# Patient Record
Sex: MALE | Race: WHITE | Employment: FULL TIME | URBAN - METROPOLITAN AREA
[De-identification: names, ages, dates, MRNs, and addresses within clinical notes are randomized per-mention and may not be internally consistent; named-entity substitution may affect disease eponyms.]

---

## 2018-07-21 PROBLEM — G40.909 RECURRENT SEIZURES (HCC): Status: ACTIVE | Noted: 2018-07-21

## 2019-03-13 ENCOUNTER — HOSPITAL ENCOUNTER (INPATIENT)
Age: 34
LOS: 2 days | Discharge: ANOTHER HOSPITAL | DRG: 100 | End: 2019-03-15
Attending: HOSPITALIST | Admitting: HOSPITALIST
Payer: COMMERCIAL

## 2019-03-13 ENCOUNTER — HOSPITAL ENCOUNTER (EMERGENCY)
Age: 34
Discharge: ANOTHER HOSPITAL | End: 2019-03-13
Attending: EMERGENCY MEDICINE
Payer: COMMERCIAL

## 2019-03-13 ENCOUNTER — APPOINTMENT (OUTPATIENT)
Dept: CT IMAGING | Age: 34
End: 2019-03-13
Payer: COMMERCIAL

## 2019-03-13 ENCOUNTER — APPOINTMENT (OUTPATIENT)
Dept: GENERAL RADIOLOGY | Age: 34
End: 2019-03-13
Payer: COMMERCIAL

## 2019-03-13 VITALS
BODY MASS INDEX: 21.9 KG/M2 | WEIGHT: 153 LBS | DIASTOLIC BLOOD PRESSURE: 50 MMHG | RESPIRATION RATE: 18 BRPM | HEIGHT: 70 IN | HEART RATE: 59 BPM | OXYGEN SATURATION: 97 % | SYSTOLIC BLOOD PRESSURE: 96 MMHG | TEMPERATURE: 98.6 F

## 2019-03-13 DIAGNOSIS — G40.901 STATUS EPILEPTICUS (HCC): Primary | ICD-10-CM

## 2019-03-13 PROBLEM — R56.9 SEIZURES (HCC): Status: ACTIVE | Noted: 2019-03-13

## 2019-03-13 LAB
ALBUMIN SERPL-MCNC: 4.4 GM/DL (ref 3.4–5)
ALP BLD-CCNC: 67 IU/L (ref 40–129)
ALT SERPL-CCNC: 13 U/L (ref 10–40)
ANION GAP SERPL CALCULATED.3IONS-SCNC: 9 MMOL/L (ref 4–16)
AST SERPL-CCNC: 19 IU/L (ref 15–37)
BASOPHILS ABSOLUTE: 0.1 K/CU MM
BASOPHILS RELATIVE PERCENT: 0.6 % (ref 0–1)
BILIRUB SERPL-MCNC: 0.3 MG/DL (ref 0–1)
BUN BLDV-MCNC: 8 MG/DL (ref 6–23)
CALCIUM SERPL-MCNC: 9 MG/DL (ref 8.3–10.6)
CHLORIDE BLD-SCNC: 104 MMOL/L (ref 99–110)
CO2: 29 MMOL/L (ref 21–32)
CREAT SERPL-MCNC: 0.8 MG/DL (ref 0.9–1.3)
DIFFERENTIAL TYPE: ABNORMAL
EOSINOPHILS ABSOLUTE: 0 K/CU MM
EOSINOPHILS RELATIVE PERCENT: 0.2 % (ref 0–3)
GFR AFRICAN AMERICAN: >60 ML/MIN/1.73M2
GFR NON-AFRICAN AMERICAN: >60 ML/MIN/1.73M2
GLUCOSE BLD-MCNC: 87 MG/DL (ref 70–99)
HCT VFR BLD CALC: 41.5 % (ref 42–52)
HEMOGLOBIN: 13.9 GM/DL (ref 13.5–18)
IMMATURE NEUTROPHIL %: 0.2 % (ref 0–0.43)
LACTATE: 1.2 MMOL/L (ref 0.4–2)
LYMPHOCYTES ABSOLUTE: 1.1 K/CU MM
LYMPHOCYTES RELATIVE PERCENT: 13.3 % (ref 24–44)
MCH RBC QN AUTO: 30 PG (ref 27–31)
MCHC RBC AUTO-ENTMCNC: 33.5 % (ref 32–36)
MCV RBC AUTO: 89.4 FL (ref 78–100)
MONOCYTES ABSOLUTE: 0.4 K/CU MM
MONOCYTES RELATIVE PERCENT: 4.7 % (ref 0–4)
PDW BLD-RTO: 13.2 % (ref 11.7–14.9)
PLATELET # BLD: 246 K/CU MM (ref 140–440)
PMV BLD AUTO: 11.2 FL (ref 7.5–11.1)
POTASSIUM SERPL-SCNC: 3.8 MMOL/L (ref 3.5–5.1)
RBC # BLD: 4.64 M/CU MM (ref 4.6–6.2)
SEGMENTED NEUTROPHILS ABSOLUTE COUNT: 6.8 K/CU MM
SEGMENTED NEUTROPHILS RELATIVE PERCENT: 81 % (ref 36–66)
SODIUM BLD-SCNC: 142 MMOL/L (ref 135–145)
TOTAL IMMATURE NEUTOROPHIL: 0.02 K/CU MM
TOTAL PROTEIN: 6.9 GM/DL (ref 6.4–8.2)
TROPONIN T: <0.01 NG/ML
WBC # BLD: 8.5 K/CU MM (ref 4–10.5)

## 2019-03-13 PROCEDURE — 83605 ASSAY OF LACTIC ACID: CPT

## 2019-03-13 PROCEDURE — 6360000002 HC RX W HCPCS: Performed by: HOSPITALIST

## 2019-03-13 PROCEDURE — 6370000000 HC RX 637 (ALT 250 FOR IP): Performed by: NURSE PRACTITIONER

## 2019-03-13 PROCEDURE — 96374 THER/PROPH/DIAG INJ IV PUSH: CPT

## 2019-03-13 PROCEDURE — 99291 CRITICAL CARE FIRST HOUR: CPT

## 2019-03-13 PROCEDURE — 6360000002 HC RX W HCPCS: Performed by: EMERGENCY MEDICINE

## 2019-03-13 PROCEDURE — 85025 COMPLETE CBC W/AUTO DIFF WBC: CPT

## 2019-03-13 PROCEDURE — 80053 COMPREHEN METABOLIC PANEL: CPT

## 2019-03-13 PROCEDURE — 2580000003 HC RX 258: Performed by: HOSPITALIST

## 2019-03-13 PROCEDURE — 87040 BLOOD CULTURE FOR BACTERIA: CPT

## 2019-03-13 PROCEDURE — 70450 CT HEAD/BRAIN W/O DYE: CPT

## 2019-03-13 PROCEDURE — 93010 ELECTROCARDIOGRAM REPORT: CPT | Performed by: INTERNAL MEDICINE

## 2019-03-13 PROCEDURE — 2060000000 HC ICU INTERMEDIATE R&B

## 2019-03-13 PROCEDURE — 84484 ASSAY OF TROPONIN QUANT: CPT

## 2019-03-13 PROCEDURE — 93005 ELECTROCARDIOGRAM TRACING: CPT | Performed by: EMERGENCY MEDICINE

## 2019-03-13 PROCEDURE — 71045 X-RAY EXAM CHEST 1 VIEW: CPT

## 2019-03-13 RX ORDER — LEVETIRACETAM 10 MG/ML
1000 INJECTION INTRAVASCULAR EVERY 12 HOURS
Status: DISCONTINUED | OUTPATIENT
Start: 2019-03-13 | End: 2019-03-13 | Stop reason: HOSPADM

## 2019-03-13 RX ORDER — LORAZEPAM 2 MG/ML
2 INJECTION INTRAMUSCULAR EVERY 4 HOURS PRN
Status: DISCONTINUED | OUTPATIENT
Start: 2019-03-13 | End: 2019-03-15 | Stop reason: HOSPADM

## 2019-03-13 RX ORDER — DICYCLOMINE HCL 20 MG
20 TABLET ORAL
Status: DISCONTINUED | OUTPATIENT
Start: 2019-03-13 | End: 2019-03-15 | Stop reason: HOSPADM

## 2019-03-13 RX ORDER — SODIUM CHLORIDE 0.9 % (FLUSH) 0.9 %
10 SYRINGE (ML) INJECTION PRN
Status: DISCONTINUED | OUTPATIENT
Start: 2019-03-13 | End: 2019-03-15 | Stop reason: HOSPADM

## 2019-03-13 RX ORDER — HYDROCODONE BITARTRATE AND ACETAMINOPHEN 7.5; 325 MG/1; MG/1
1 TABLET ORAL EVERY 6 HOURS PRN
Status: DISCONTINUED | OUTPATIENT
Start: 2019-03-13 | End: 2019-03-15 | Stop reason: HOSPADM

## 2019-03-13 RX ORDER — ACETAMINOPHEN 500 MG
500 TABLET ORAL EVERY 4 HOURS PRN
Status: DISCONTINUED | OUTPATIENT
Start: 2019-03-13 | End: 2019-03-15 | Stop reason: HOSPADM

## 2019-03-13 RX ORDER — LORAZEPAM 2 MG/ML
1 INJECTION INTRAMUSCULAR ONCE
Status: COMPLETED | OUTPATIENT
Start: 2019-03-13 | End: 2019-03-13

## 2019-03-13 RX ORDER — ONDANSETRON 2 MG/ML
4 INJECTION INTRAMUSCULAR; INTRAVENOUS EVERY 6 HOURS PRN
Status: DISCONTINUED | OUTPATIENT
Start: 2019-03-13 | End: 2019-03-15 | Stop reason: HOSPADM

## 2019-03-13 RX ORDER — TOPIRAMATE 25 MG/1
50 TABLET ORAL 2 TIMES DAILY
Status: ON HOLD | COMMUNITY
End: 2019-03-13 | Stop reason: ALTCHOICE

## 2019-03-13 RX ORDER — ZOLMITRIPTAN 5 MG/1
5 TABLET, FILM COATED ORAL PRN
COMMUNITY
End: 2019-08-17

## 2019-03-13 RX ORDER — SODIUM CHLORIDE 0.9 % (FLUSH) 0.9 %
10 SYRINGE (ML) INJECTION EVERY 12 HOURS SCHEDULED
Status: DISCONTINUED | OUTPATIENT
Start: 2019-03-13 | End: 2019-03-15 | Stop reason: HOSPADM

## 2019-03-13 RX ADMIN — ENOXAPARIN SODIUM 40 MG: 40 INJECTION SUBCUTANEOUS at 21:25

## 2019-03-13 RX ADMIN — HYDROCODONE BITARTRATE AND ACETAMINOPHEN 1 TABLET: 7.5; 325 TABLET ORAL at 21:24

## 2019-03-13 RX ADMIN — SODIUM CHLORIDE, PRESERVATIVE FREE 10 ML: 5 INJECTION INTRAVENOUS at 21:26

## 2019-03-13 RX ADMIN — LORAZEPAM 1 MG: 2 INJECTION INTRAMUSCULAR; INTRAVENOUS at 10:30

## 2019-03-13 RX ADMIN — LEVETIRACETAM 500 MG: 100 INJECTION, SOLUTION INTRAVENOUS at 21:38

## 2019-03-13 RX ADMIN — DICYCLOMINE HYDROCHLORIDE 20 MG: 20 TABLET ORAL at 21:25

## 2019-03-13 RX ADMIN — LEVETIRACETAM 1000 MG: 10 INJECTION INTRAVENOUS at 10:30

## 2019-03-13 ASSESSMENT — PAIN DESCRIPTION - ONSET: ONSET: ON-GOING

## 2019-03-13 ASSESSMENT — PAIN SCALES - GENERAL
PAINLEVEL_OUTOF10: 0
PAINLEVEL_OUTOF10: 6
PAINLEVEL_OUTOF10: 0

## 2019-03-13 ASSESSMENT — PAIN DESCRIPTION - PAIN TYPE: TYPE: ACUTE PAIN

## 2019-03-13 ASSESSMENT — PAIN DESCRIPTION - LOCATION: LOCATION: GENERALIZED;HEAD

## 2019-03-13 ASSESSMENT — PAIN DESCRIPTION - PROGRESSION: CLINICAL_PROGRESSION: NOT CHANGED

## 2019-03-13 ASSESSMENT — PAIN DESCRIPTION - DESCRIPTORS: DESCRIPTORS: DISCOMFORT;ACHING

## 2019-03-13 ASSESSMENT — PAIN DESCRIPTION - FREQUENCY: FREQUENCY: CONTINUOUS

## 2019-03-14 LAB
ANION GAP SERPL CALCULATED.3IONS-SCNC: 7 MMOL/L (ref 4–16)
BUN BLDV-MCNC: 12 MG/DL (ref 6–23)
CALCIUM SERPL-MCNC: 9 MG/DL (ref 8.3–10.6)
CHLORIDE BLD-SCNC: 102 MMOL/L (ref 99–110)
CO2: 32 MMOL/L (ref 21–32)
CREAT SERPL-MCNC: 0.9 MG/DL (ref 0.9–1.3)
GFR AFRICAN AMERICAN: >60 ML/MIN/1.73M2
GFR NON-AFRICAN AMERICAN: >60 ML/MIN/1.73M2
GLUCOSE BLD-MCNC: 99 MG/DL (ref 70–99)
POTASSIUM SERPL-SCNC: 4.1 MMOL/L (ref 3.5–5.1)
SODIUM BLD-SCNC: 141 MMOL/L (ref 135–145)

## 2019-03-14 PROCEDURE — 6370000000 HC RX 637 (ALT 250 FOR IP): Performed by: NURSE PRACTITIONER

## 2019-03-14 PROCEDURE — 99255 IP/OBS CONSLTJ NEW/EST HI 80: CPT | Performed by: PSYCHIATRY & NEUROLOGY

## 2019-03-14 PROCEDURE — 94761 N-INVAS EAR/PLS OXIMETRY MLT: CPT

## 2019-03-14 PROCEDURE — 2060000000 HC ICU INTERMEDIATE R&B

## 2019-03-14 PROCEDURE — 2580000003 HC RX 258: Performed by: HOSPITALIST

## 2019-03-14 PROCEDURE — 6360000002 HC RX W HCPCS: Performed by: HOSPITALIST

## 2019-03-14 PROCEDURE — 36415 COLL VENOUS BLD VENIPUNCTURE: CPT

## 2019-03-14 PROCEDURE — 80048 BASIC METABOLIC PNL TOTAL CA: CPT

## 2019-03-14 PROCEDURE — 95819 EEG AWAKE AND ASLEEP: CPT

## 2019-03-14 RX ADMIN — LEVETIRACETAM 500 MG: 100 INJECTION, SOLUTION INTRAVENOUS at 10:03

## 2019-03-14 RX ADMIN — LORAZEPAM 2 MG: 2 INJECTION INTRAMUSCULAR; INTRAVENOUS at 22:18

## 2019-03-14 RX ADMIN — LORAZEPAM 2 MG: 2 INJECTION INTRAMUSCULAR; INTRAVENOUS at 13:14

## 2019-03-14 RX ADMIN — ENOXAPARIN SODIUM 40 MG: 40 INJECTION SUBCUTANEOUS at 08:23

## 2019-03-14 RX ADMIN — SODIUM CHLORIDE, PRESERVATIVE FREE 10 ML: 5 INJECTION INTRAVENOUS at 08:23

## 2019-03-14 RX ADMIN — DICYCLOMINE HYDROCHLORIDE 20 MG: 20 TABLET ORAL at 19:12

## 2019-03-14 RX ADMIN — LEVETIRACETAM 500 MG: 100 INJECTION, SOLUTION INTRAVENOUS at 22:20

## 2019-03-14 RX ADMIN — DICYCLOMINE HYDROCHLORIDE 20 MG: 20 TABLET ORAL at 12:37

## 2019-03-14 RX ADMIN — HYDROCODONE BITARTRATE AND ACETAMINOPHEN 1 TABLET: 7.5; 325 TABLET ORAL at 19:12

## 2019-03-14 RX ADMIN — HYDROCODONE BITARTRATE AND ACETAMINOPHEN 1 TABLET: 7.5; 325 TABLET ORAL at 10:07

## 2019-03-14 RX ADMIN — DICYCLOMINE HYDROCHLORIDE 20 MG: 20 TABLET ORAL at 21:23

## 2019-03-14 RX ADMIN — SODIUM CHLORIDE, PRESERVATIVE FREE 10 ML: 5 INJECTION INTRAVENOUS at 21:23

## 2019-03-14 RX ADMIN — DICYCLOMINE HYDROCHLORIDE 20 MG: 20 TABLET ORAL at 08:23

## 2019-03-14 ASSESSMENT — PAIN DESCRIPTION - LOCATION: LOCATION: GENERALIZED

## 2019-03-14 ASSESSMENT — PAIN SCALES - GENERAL
PAINLEVEL_OUTOF10: 2
PAINLEVEL_OUTOF10: 6
PAINLEVEL_OUTOF10: 0
PAINLEVEL_OUTOF10: 6
PAINLEVEL_OUTOF10: 0

## 2019-03-14 ASSESSMENT — PAIN DESCRIPTION - PAIN TYPE: TYPE: ACUTE PAIN

## 2019-03-15 VITALS
RESPIRATION RATE: 19 BRPM | DIASTOLIC BLOOD PRESSURE: 70 MMHG | WEIGHT: 170 LBS | HEIGHT: 70 IN | TEMPERATURE: 97.1 F | BODY MASS INDEX: 24.34 KG/M2 | HEART RATE: 87 BPM | OXYGEN SATURATION: 96 % | SYSTOLIC BLOOD PRESSURE: 116 MMHG

## 2019-03-15 PROCEDURE — 99225 PR SBSQ OBSERVATION CARE/DAY 25 MINUTES: CPT | Performed by: CLINICAL NURSE SPECIALIST

## 2019-03-15 PROCEDURE — 6360000002 HC RX W HCPCS: Performed by: HOSPITALIST

## 2019-03-15 PROCEDURE — 6370000000 HC RX 637 (ALT 250 FOR IP): Performed by: NURSE PRACTITIONER

## 2019-03-15 PROCEDURE — 2580000003 HC RX 258: Performed by: HOSPITALIST

## 2019-03-15 RX ADMIN — DICYCLOMINE HYDROCHLORIDE 20 MG: 20 TABLET ORAL at 06:40

## 2019-03-15 RX ADMIN — HYDROCODONE BITARTRATE AND ACETAMINOPHEN 1 TABLET: 7.5; 325 TABLET ORAL at 09:08

## 2019-03-15 RX ADMIN — LORAZEPAM 2 MG: 2 INJECTION INTRAMUSCULAR; INTRAVENOUS at 11:28

## 2019-03-15 RX ADMIN — DICYCLOMINE HYDROCHLORIDE 20 MG: 20 TABLET ORAL at 11:33

## 2019-03-15 RX ADMIN — SODIUM CHLORIDE, PRESERVATIVE FREE 10 ML: 5 INJECTION INTRAVENOUS at 09:01

## 2019-03-15 RX ADMIN — LEVETIRACETAM 500 MG: 100 INJECTION, SOLUTION INTRAVENOUS at 11:32

## 2019-03-15 RX ADMIN — ENOXAPARIN SODIUM 40 MG: 40 INJECTION SUBCUTANEOUS at 09:01

## 2019-03-15 ASSESSMENT — PAIN DESCRIPTION - DESCRIPTORS: DESCRIPTORS: ACHING

## 2019-03-15 ASSESSMENT — PAIN SCALES - GENERAL
PAINLEVEL_OUTOF10: 5
PAINLEVEL_OUTOF10: 0
PAINLEVEL_OUTOF10: 2

## 2019-03-15 ASSESSMENT — PAIN DESCRIPTION - ONSET: ONSET: GRADUAL

## 2019-03-15 ASSESSMENT — PAIN DESCRIPTION - PAIN TYPE: TYPE: ACUTE PAIN

## 2019-03-15 ASSESSMENT — PAIN DESCRIPTION - FREQUENCY: FREQUENCY: INTERMITTENT

## 2019-03-18 LAB
CULTURE: NORMAL
Lab: NORMAL
SPECIMEN: NORMAL

## 2019-03-19 LAB
EKG ATRIAL RATE: 69 BPM
EKG DIAGNOSIS: NORMAL
EKG P AXIS: 56 DEGREES
EKG P-R INTERVAL: 154 MS
EKG Q-T INTERVAL: 378 MS
EKG QRS DURATION: 98 MS
EKG QTC CALCULATION (BAZETT): 405 MS
EKG R AXIS: 11 DEGREES
EKG T AXIS: 23 DEGREES
EKG VENTRICULAR RATE: 69 BPM

## 2019-03-20 LAB
CULTURE: ABNORMAL
Lab: ABNORMAL
SPECIMEN: ABNORMAL

## 2019-08-17 ENCOUNTER — HOSPITAL ENCOUNTER (EMERGENCY)
Age: 34
Discharge: HOME | End: 2019-08-17
Attending: EMERGENCY MEDICINE
Payer: COMMERCIAL

## 2019-08-17 VITALS
HEART RATE: 71 BPM | HEIGHT: 69 IN | WEIGHT: 175 LBS | RESPIRATION RATE: 16 BRPM | SYSTOLIC BLOOD PRESSURE: 119 MMHG | OXYGEN SATURATION: 94 % | BODY MASS INDEX: 25.92 KG/M2 | DIASTOLIC BLOOD PRESSURE: 84 MMHG | TEMPERATURE: 97.5 F

## 2019-08-17 DIAGNOSIS — L50.9 URTICARIA: Primary | ICD-10-CM

## 2019-08-17 DIAGNOSIS — T50.905A MEDICATION REACTION, INITIAL ENCOUNTER: ICD-10-CM

## 2019-08-17 PROCEDURE — 99282 EMERGENCY DEPT VISIT SF MDM: CPT

## 2019-08-17 PROCEDURE — 6360000002 HC RX W HCPCS: Performed by: EMERGENCY MEDICINE

## 2019-08-17 PROCEDURE — 96372 THER/PROPH/DIAG INJ SC/IM: CPT

## 2019-08-17 PROCEDURE — 6370000000 HC RX 637 (ALT 250 FOR IP): Performed by: EMERGENCY MEDICINE

## 2019-08-17 RX ORDER — OXYCODONE HYDROCHLORIDE AND ACETAMINOPHEN 5; 325 MG/1; MG/1
1 TABLET ORAL EVERY 4 HOURS PRN
COMMUNITY
End: 2021-02-17

## 2019-08-17 RX ORDER — PREDNISONE 20 MG/1
60 TABLET ORAL ONCE
Status: COMPLETED | OUTPATIENT
Start: 2019-08-17 | End: 2019-08-17

## 2019-08-17 RX ORDER — DIPHENHYDRAMINE HYDROCHLORIDE 50 MG/ML
50 INJECTION INTRAMUSCULAR; INTRAVENOUS ONCE
Status: COMPLETED | OUTPATIENT
Start: 2019-08-17 | End: 2019-08-17

## 2019-08-17 RX ORDER — DIPHENHYDRAMINE HCL 25 MG
50 CAPSULE ORAL ONCE
Status: COMPLETED | OUTPATIENT
Start: 2019-08-17 | End: 2019-08-17

## 2019-08-17 RX ORDER — FAMOTIDINE 20 MG/1
20 TABLET, FILM COATED ORAL ONCE
Status: COMPLETED | OUTPATIENT
Start: 2019-08-17 | End: 2019-08-17

## 2019-08-17 RX ORDER — PREDNISONE 10 MG/1
TABLET ORAL
Qty: 21 TABLET | Refills: 0 | Status: SHIPPED | OUTPATIENT
Start: 2019-08-17 | End: 2020-06-15 | Stop reason: ALTCHOICE

## 2019-08-17 RX ORDER — CETIRIZINE HYDROCHLORIDE 10 MG/1
10 TABLET ORAL ONCE
Status: COMPLETED | OUTPATIENT
Start: 2019-08-17 | End: 2019-08-17

## 2019-08-17 RX ORDER — BUPROPION HYDROCHLORIDE 150 MG/1
150 TABLET ORAL EVERY MORNING
COMMUNITY
End: 2019-08-17

## 2019-08-17 RX ORDER — DIPHENHYDRAMINE HCL 25 MG
50 CAPSULE ORAL EVERY 6 HOURS PRN
Qty: 30 CAPSULE | Refills: 0 | Status: SHIPPED | OUTPATIENT
Start: 2019-08-17 | End: 2019-08-24

## 2019-08-17 RX ADMIN — DIPHENHYDRAMINE HYDROCHLORIDE 50 MG: 50 INJECTION INTRAMUSCULAR; INTRAVENOUS at 07:47

## 2019-08-17 RX ADMIN — PREDNISONE 60 MG: 20 TABLET ORAL at 07:46

## 2019-08-17 RX ADMIN — FAMOTIDINE 20 MG: 20 TABLET ORAL at 07:46

## 2019-08-17 RX ADMIN — DIPHENHYDRAMINE HYDROCHLORIDE 50 MG: 25 CAPSULE ORAL at 08:30

## 2019-08-17 RX ADMIN — CETIRIZINE HYDROCHLORIDE 10 MG: 10 TABLET, FILM COATED ORAL at 07:46

## 2019-08-17 ASSESSMENT — PAIN DESCRIPTION - PAIN TYPE: TYPE: ACUTE PAIN

## 2019-08-17 ASSESSMENT — PAIN DESCRIPTION - DESCRIPTORS: DESCRIPTORS: BURNING

## 2019-08-17 ASSESSMENT — PAIN DESCRIPTION - ONSET: ONSET: ON-GOING

## 2019-08-17 ASSESSMENT — PAIN SCALES - GENERAL
PAINLEVEL_OUTOF10: 6
PAINLEVEL_OUTOF10: 5

## 2019-08-17 ASSESSMENT — PAIN DESCRIPTION - FREQUENCY: FREQUENCY: CONTINUOUS

## 2019-08-17 ASSESSMENT — PAIN DESCRIPTION - PROGRESSION: CLINICAL_PROGRESSION: GRADUALLY WORSENING

## 2019-08-17 ASSESSMENT — PAIN - FUNCTIONAL ASSESSMENT: PAIN_FUNCTIONAL_ASSESSMENT: 0-10

## 2019-08-17 NOTE — ED PROVIDER NOTES
Emergency Department Encounter  Location: [facility] [address] [place] [address]    Patient: Cedric Yang  MRN: [id number]  : 1985  Date of evaluation: 2019  ED Provider: Brennen Diaz DO, FACEP    Chief Complaint:    Rash (pt with possible allergic reaction, some look like insect bites for face and arms, but has hives to trunk and down legs. Started wellbutrin )    [place]:  Cedric Yang is a 34 y.o. male that presents to the emergency department with complaints of a rash that started last night. The patient states he started Wellbutrin about 8 days ago for anxiety. He states this morning he awoke with a rash that is present on his upper extremities his face his neck his chest is back his groin his abdomen and his legs. He states it is very itchy and painful. He denies any difficulty swallowing or wheezing. He states he has been on Wellbutrin previously with no problems like this. He has not taken any medication for it. He describes his pain is 6 out of 10 and burning especially in his upper extremities. He is very anxious regarding what is going on. ROS:  At least 10 systems reviewed and otherwise acutely negative except as in the [address] Past Medical History:   Diagnosis Date    Arachnoid cyst     Arthritis     Chiari malformation type I ([facility] [address]     Depression     IBS (irritable bowel syndrome)     Migraines     Seizures (HCC)      Past Surgical History:   Procedure Laterality Date    APPENDECTOMY      NASAL SEPTUM SURGERY       History reviewed. No pertinent family history.   Social History     Socioeconomic History    Marital status: Single     Spouse name: Not on file    Number of children: Not on file    Years of education: Not on file    Highest education level: Not on file   Occupational History    Not on file   Social Needs    Financial resource strain: Not on file    Food insecurity:     Worry: Not on file     Inability: Not on file   [employer] of Pepcid p.o. and 10 mg of Zyrtec p.o. as well as 60 mg of prednisone p.o. I will keep him on a prednisone taper for the next 6 days. He will be on Benadryl 50 mg as needed for the urticaria. He is having no difficulty speaking and is having no wheezing. I think he is safe for discharge. I do think he is having a medication reaction to the Wellbutrin. He is encouraged to stop that medication. He is instructed to follow-up with his primary caregiver early next week or return to the emergency department if his condition worsens. He is discharged in stable condition at this time. Final Impression:  1. Urticaria    2.  Medication reaction, initial encounter      DISPOSITION Decision To Discharge    Patient referred to:  NUBIA Lincoln CNP  [address] [facility]  [phone]    In 3 days  For follow up    Discharge medications:  New Prescriptions    DIPHENHYDRAMINE (BENADRYL) 25 MG CAPSULE    Take 2 capsules by mouth every 6 hours as needed for Itching    PREDNISONE (DELTASONE) 10 MG TABLET    Take 6 tablets on day 1, take 5 tablets on day 2, take 4 tablets on day 3, take 3 tablets on day 4, take 2 tablets on day 5, and take 1 tablet on day 6     (Please note that portions of this note may have been completed with a voice recognition program. Efforts were made to edit the dictations but occasionally words are mis-transcribed.)    Beatriz Pickett DO, [facility]  Board certified in [facility], [address] [facility]  08/17/19 5176 [facility], [address] [facility]  08/17/19 0048

## 2020-06-15 ENCOUNTER — HOSPITAL ENCOUNTER (EMERGENCY)
Age: 35
Discharge: ANOTHER HOSPITAL | End: 2020-06-16
Attending: EMERGENCY MEDICINE
Payer: COMMERCIAL

## 2020-06-15 ENCOUNTER — HOSPITAL ENCOUNTER (EMERGENCY)
Age: 35
Discharge: HOME | End: 2020-06-15
Attending: EMERGENCY MEDICINE
Payer: COMMERCIAL

## 2020-06-15 ENCOUNTER — APPOINTMENT (OUTPATIENT)
Dept: CT IMAGING | Age: 35
End: 2020-06-15
Payer: COMMERCIAL

## 2020-06-15 VITALS
WEIGHT: 196 LBS | HEART RATE: 50 BPM | BODY MASS INDEX: 29.03 KG/M2 | RESPIRATION RATE: 16 BRPM | OXYGEN SATURATION: 98 % | DIASTOLIC BLOOD PRESSURE: 63 MMHG | TEMPERATURE: 97.8 F | SYSTOLIC BLOOD PRESSURE: 101 MMHG | HEIGHT: 69 IN

## 2020-06-15 LAB
CHP ED QC CHECK: YES
GLUCOSE BLD-MCNC: 113 MG/DL (ref 70–99)
GLUCOSE BLD-MCNC: 117 MG/DL

## 2020-06-15 PROCEDURE — G0480 DRUG TEST DEF 1-7 CLASSES: HCPCS

## 2020-06-15 PROCEDURE — 99284 EMERGENCY DEPT VISIT MOD MDM: CPT

## 2020-06-15 PROCEDURE — 85025 COMPLETE CBC W/AUTO DIFF WBC: CPT

## 2020-06-15 PROCEDURE — 2580000003 HC RX 258: Performed by: EMERGENCY MEDICINE

## 2020-06-15 PROCEDURE — 82962 GLUCOSE BLOOD TEST: CPT

## 2020-06-15 PROCEDURE — 80053 COMPREHEN METABOLIC PANEL: CPT

## 2020-06-15 PROCEDURE — 6360000002 HC RX W HCPCS: Performed by: EMERGENCY MEDICINE

## 2020-06-15 PROCEDURE — 96365 THER/PROPH/DIAG IV INF INIT: CPT

## 2020-06-15 PROCEDURE — 96374 THER/PROPH/DIAG INJ IV PUSH: CPT

## 2020-06-15 PROCEDURE — 93005 ELECTROCARDIOGRAM TRACING: CPT | Performed by: EMERGENCY MEDICINE

## 2020-06-15 PROCEDURE — 70450 CT HEAD/BRAIN W/O DYE: CPT

## 2020-06-15 PROCEDURE — 99285 EMERGENCY DEPT VISIT HI MDM: CPT

## 2020-06-15 PROCEDURE — 93010 ELECTROCARDIOGRAM REPORT: CPT | Performed by: INTERNAL MEDICINE

## 2020-06-15 RX ORDER — SODIUM CHLORIDE 0.9 % (FLUSH) 0.9 %
10 SYRINGE (ML) INJECTION PRN
Status: DISCONTINUED | OUTPATIENT
Start: 2020-06-15 | End: 2020-06-16 | Stop reason: HOSPADM

## 2020-06-15 RX ORDER — LEVETIRACETAM 500 MG/1
500 TABLET ORAL 2 TIMES DAILY
Qty: 60 TABLET | Refills: 3 | Status: ON HOLD | OUTPATIENT
Start: 2020-06-15 | End: 2020-06-17 | Stop reason: HOSPADM

## 2020-06-15 RX ORDER — SODIUM CHLORIDE 0.9 % (FLUSH) 0.9 %
10 SYRINGE (ML) INJECTION EVERY 12 HOURS SCHEDULED
Status: DISCONTINUED | OUTPATIENT
Start: 2020-06-15 | End: 2020-06-16 | Stop reason: HOSPADM

## 2020-06-15 RX ORDER — PAROXETINE 30 MG/1
30 TABLET, FILM COATED ORAL DAILY
Status: ON HOLD | COMMUNITY
Start: 2020-04-16 | End: 2020-06-17 | Stop reason: HOSPADM

## 2020-06-15 RX ORDER — OXYCODONE HYDROCHLORIDE AND ACETAMINOPHEN 5; 325 MG/1; MG/1
2 TABLET ORAL ONCE
Status: DISCONTINUED | OUTPATIENT
Start: 2020-06-15 | End: 2020-06-15

## 2020-06-15 RX ADMIN — LEVETIRACETAM 1000 MG: 100 INJECTION, SOLUTION INTRAVENOUS at 10:31

## 2020-06-15 ASSESSMENT — PAIN DESCRIPTION - PAIN TYPE: TYPE: ACUTE PAIN

## 2020-06-15 ASSESSMENT — PAIN DESCRIPTION - LOCATION: LOCATION: HEAD

## 2020-06-15 ASSESSMENT — PAIN DESCRIPTION - DESCRIPTORS: DESCRIPTORS: PRESSURE

## 2020-06-15 ASSESSMENT — PAIN DESCRIPTION - FREQUENCY: FREQUENCY: CONTINUOUS

## 2020-06-15 ASSESSMENT — PAIN SCALES - GENERAL: PAINLEVEL_OUTOF10: 8

## 2020-06-15 NOTE — ED PROVIDER NOTES
normal.      Left Ear: External ear normal.   Eyes:      General: No scleral icterus. Right eye: No discharge. Left eye: No discharge. Conjunctiva/sclera: Conjunctivae normal.      Pupils: Pupils are equal, round, and reactive to light. Neck:      Musculoskeletal: Normal range of motion and neck supple. Thyroid: No thyromegaly. Vascular: No JVD. Trachea: No tracheal deviation. Cardiovascular:      Rate and Rhythm: Normal rate and regular rhythm. Heart sounds: Normal heart sounds. No murmur. No friction rub. No gallop. Pulmonary:      Effort: Pulmonary effort is normal. No respiratory distress. Breath sounds: Normal breath sounds. No stridor. No wheezing or rales. Chest:      Chest wall: No tenderness. Abdominal:      General: Bowel sounds are normal. There is no distension. Palpations: Abdomen is soft. There is no mass. Tenderness: There is no abdominal tenderness. There is no guarding or rebound. Hernia: No hernia is present. Musculoskeletal: Normal range of motion. General: No tenderness or deformity. Lymphadenopathy:      Cervical: No cervical adenopathy. Skin:     General: Skin is warm and dry. Coloration: Skin is not pale. Findings: No erythema or rash. Neurological:      Mental Status: He is oriented to person, place, and time. He is lethargic. Cranial Nerves: No cranial nerve deficit. Sensory: No sensory deficit. Deep Tendon Reflexes: Reflexes are normal and symmetric. Reflexes normal.   Psychiatric:         Speech: Speech normal.         Behavior: Behavior normal.         Thought Content:  Thought content normal.         Judgment: Judgment normal.         I have reviewed and interpreted all of the currently available lab results from this visit (ifapplicable):  Results for orders placed or performed during the hospital encounter of 06/15/20   POCT Glucose   Result Value Ref Range    Glucose 117

## 2020-06-16 ENCOUNTER — APPOINTMENT (OUTPATIENT)
Dept: MRI IMAGING | Age: 35
DRG: 101 | End: 2020-06-16
Attending: INTERNAL MEDICINE
Payer: COMMERCIAL

## 2020-06-16 ENCOUNTER — HOSPITAL ENCOUNTER (INPATIENT)
Age: 35
LOS: 1 days | Discharge: HOME | DRG: 101 | End: 2020-06-17
Attending: INTERNAL MEDICINE | Admitting: PHYSICIAN ASSISTANT
Payer: COMMERCIAL

## 2020-06-16 VITALS
BODY MASS INDEX: 27.58 KG/M2 | DIASTOLIC BLOOD PRESSURE: 70 MMHG | HEART RATE: 39 BPM | TEMPERATURE: 97.7 F | OXYGEN SATURATION: 98 % | RESPIRATION RATE: 16 BRPM | HEIGHT: 71 IN | SYSTOLIC BLOOD PRESSURE: 110 MMHG | WEIGHT: 197 LBS

## 2020-06-16 PROBLEM — R56.9 SEIZURE (HCC): Status: ACTIVE | Noted: 2020-06-16

## 2020-06-16 LAB
ALBUMIN SERPL-MCNC: 4.1 GM/DL (ref 3.4–5)
ALBUMIN SERPL-MCNC: 4.3 GM/DL (ref 3.4–5)
ALCOHOL SCREEN SERUM: <0.01 %WT/VOL
ALP BLD-CCNC: 73 IU/L (ref 40–129)
ALP BLD-CCNC: 78 IU/L (ref 40–128)
ALT SERPL-CCNC: 21 U/L (ref 10–40)
ALT SERPL-CCNC: 22 U/L (ref 10–40)
AMPHETAMINES: NEGATIVE
ANION GAP SERPL CALCULATED.3IONS-SCNC: 6 MMOL/L (ref 4–16)
ANION GAP SERPL CALCULATED.3IONS-SCNC: 7 MMOL/L (ref 4–16)
AST SERPL-CCNC: 19 IU/L (ref 15–37)
AST SERPL-CCNC: 20 IU/L (ref 15–37)
BARBITURATE SCREEN URINE: NEGATIVE
BASOPHILS ABSOLUTE: 0.1 K/CU MM
BASOPHILS RELATIVE PERCENT: 0.8 % (ref 0–1)
BENZODIAZEPINE SCREEN, URINE: ABNORMAL
BILIRUB SERPL-MCNC: 0.4 MG/DL (ref 0–1)
BILIRUB SERPL-MCNC: 0.5 MG/DL (ref 0–1)
BUN BLDV-MCNC: 13 MG/DL (ref 6–23)
BUN BLDV-MCNC: 13 MG/DL (ref 6–23)
CALCIUM SERPL-MCNC: 9.2 MG/DL (ref 8.3–10.6)
CALCIUM SERPL-MCNC: 9.2 MG/DL (ref 8.3–10.6)
CANNABINOID SCREEN URINE: ABNORMAL
CHLORIDE BLD-SCNC: 101 MMOL/L (ref 99–110)
CHLORIDE BLD-SCNC: 104 MMOL/L (ref 99–110)
CO2: 26 MMOL/L (ref 21–32)
CO2: 31 MMOL/L (ref 21–32)
COCAINE METABOLITE: NEGATIVE
CREAT SERPL-MCNC: 0.7 MG/DL (ref 0.9–1.3)
CREAT SERPL-MCNC: 0.8 MG/DL (ref 0.9–1.3)
DIFFERENTIAL TYPE: ABNORMAL
EOSINOPHILS ABSOLUTE: 0.4 K/CU MM
EOSINOPHILS RELATIVE PERCENT: 3.5 % (ref 0–3)
GFR AFRICAN AMERICAN: >60 ML/MIN/1.73M2
GFR AFRICAN AMERICAN: >60 ML/MIN/1.73M2
GFR NON-AFRICAN AMERICAN: >60 ML/MIN/1.73M2
GFR NON-AFRICAN AMERICAN: >60 ML/MIN/1.73M2
GLUCOSE BLD-MCNC: 102 MG/DL (ref 70–99)
GLUCOSE BLD-MCNC: 125 MG/DL (ref 70–99)
HCT VFR BLD CALC: 43.8 % (ref 42–52)
HCT VFR BLD CALC: 45 % (ref 42–52)
HEMOGLOBIN: 14.6 GM/DL (ref 13.5–18)
HEMOGLOBIN: 14.7 GM/DL (ref 13.5–18)
IMMATURE NEUTROPHIL %: 0.4 % (ref 0–0.43)
LYMPHOCYTES ABSOLUTE: 3.2 K/CU MM
LYMPHOCYTES RELATIVE PERCENT: 30.8 % (ref 24–44)
MCH RBC QN AUTO: 29.2 PG (ref 27–31)
MCH RBC QN AUTO: 30.3 PG (ref 27–31)
MCHC RBC AUTO-ENTMCNC: 32.7 % (ref 32–36)
MCHC RBC AUTO-ENTMCNC: 33.3 % (ref 32–36)
MCV RBC AUTO: 89.5 FL (ref 78–100)
MCV RBC AUTO: 90.9 FL (ref 78–100)
MONOCYTES ABSOLUTE: 0.9 K/CU MM
MONOCYTES RELATIVE PERCENT: 8.6 % (ref 0–4)
OPIATES, URINE: NEGATIVE
OXYCODONE: ABNORMAL
PDW BLD-RTO: 12.7 % (ref 11.7–14.9)
PDW BLD-RTO: 12.8 % (ref 11.7–14.9)
PHENCYCLIDINE, URINE: NEGATIVE
PLATELET # BLD: 257 K/CU MM (ref 140–440)
PLATELET # BLD: 261 K/CU MM (ref 140–440)
PMV BLD AUTO: 11 FL (ref 7.5–11.1)
PMV BLD AUTO: 11.2 FL (ref 7.5–11.1)
POTASSIUM SERPL-SCNC: 3.8 MMOL/L (ref 3.5–5.1)
POTASSIUM SERPL-SCNC: 4.3 MMOL/L (ref 3.5–5.1)
RBC # BLD: 4.82 M/CU MM (ref 4.6–6.2)
RBC # BLD: 5.03 M/CU MM (ref 4.6–6.2)
SEGMENTED NEUTROPHILS ABSOLUTE COUNT: 5.8 K/CU MM
SEGMENTED NEUTROPHILS RELATIVE PERCENT: 55.9 % (ref 36–66)
SODIUM BLD-SCNC: 137 MMOL/L (ref 135–145)
SODIUM BLD-SCNC: 138 MMOL/L (ref 135–145)
TOTAL IMMATURE NEUTOROPHIL: 0.04 K/CU MM
TOTAL PROTEIN: 6.7 GM/DL (ref 6.4–8.2)
TOTAL PROTEIN: 6.8 GM/DL (ref 6.4–8.2)
WBC # BLD: 10.4 K/CU MM (ref 4–10.5)
WBC # BLD: 9.9 K/CU MM (ref 4–10.5)

## 2020-06-16 PROCEDURE — 6360000002 HC RX W HCPCS: Performed by: PHYSICIAN ASSISTANT

## 2020-06-16 PROCEDURE — 70544 MR ANGIOGRAPHY HEAD W/O DYE: CPT

## 2020-06-16 PROCEDURE — 6370000000 HC RX 637 (ALT 250 FOR IP): Performed by: INTERNAL MEDICINE

## 2020-06-16 PROCEDURE — G0379 DIRECT REFER HOSPITAL OBSERV: HCPCS

## 2020-06-16 PROCEDURE — 85027 COMPLETE CBC AUTOMATED: CPT

## 2020-06-16 PROCEDURE — 2580000003 HC RX 258: Performed by: EMERGENCY MEDICINE

## 2020-06-16 PROCEDURE — 70551 MRI BRAIN STEM W/O DYE: CPT

## 2020-06-16 PROCEDURE — G0378 HOSPITAL OBSERVATION PER HR: HCPCS

## 2020-06-16 PROCEDURE — 6370000000 HC RX 637 (ALT 250 FOR IP): Performed by: PHYSICIAN ASSISTANT

## 2020-06-16 PROCEDURE — 2580000003 HC RX 258: Performed by: PHYSICIAN ASSISTANT

## 2020-06-16 PROCEDURE — 80307 DRUG TEST PRSMV CHEM ANLYZR: CPT

## 2020-06-16 PROCEDURE — 6360000002 HC RX W HCPCS: Performed by: EMERGENCY MEDICINE

## 2020-06-16 PROCEDURE — 96372 THER/PROPH/DIAG INJ SC/IM: CPT

## 2020-06-16 PROCEDURE — 94761 N-INVAS EAR/PLS OXIMETRY MLT: CPT

## 2020-06-16 PROCEDURE — 1200000000 HC SEMI PRIVATE

## 2020-06-16 PROCEDURE — 80053 COMPREHEN METABOLIC PANEL: CPT

## 2020-06-16 RX ORDER — SODIUM CHLORIDE 0.9 % (FLUSH) 0.9 %
10 SYRINGE (ML) INJECTION PRN
Status: DISCONTINUED | OUTPATIENT
Start: 2020-06-16 | End: 2020-06-17 | Stop reason: HOSPADM

## 2020-06-16 RX ORDER — NICOTINE 21 MG/24HR
1 PATCH, TRANSDERMAL 24 HOURS TRANSDERMAL DAILY
Status: DISCONTINUED | OUTPATIENT
Start: 2020-06-16 | End: 2020-06-17 | Stop reason: HOSPADM

## 2020-06-16 RX ORDER — LORAZEPAM 2 MG/ML
2 INJECTION INTRAMUSCULAR EVERY 4 HOURS PRN
Status: DISCONTINUED | OUTPATIENT
Start: 2020-06-16 | End: 2020-06-17 | Stop reason: HOSPADM

## 2020-06-16 RX ORDER — OXYCODONE HYDROCHLORIDE AND ACETAMINOPHEN 5; 325 MG/1; MG/1
1 TABLET ORAL EVERY 8 HOURS PRN
Status: DISCONTINUED | OUTPATIENT
Start: 2020-06-16 | End: 2020-06-17 | Stop reason: HOSPADM

## 2020-06-16 RX ORDER — PROMETHAZINE HYDROCHLORIDE 25 MG/1
12.5 TABLET ORAL EVERY 6 HOURS PRN
Status: DISCONTINUED | OUTPATIENT
Start: 2020-06-16 | End: 2020-06-17 | Stop reason: HOSPADM

## 2020-06-16 RX ORDER — ACETAMINOPHEN 325 MG/1
650 TABLET ORAL EVERY 6 HOURS PRN
Status: DISCONTINUED | OUTPATIENT
Start: 2020-06-16 | End: 2020-06-17 | Stop reason: HOSPADM

## 2020-06-16 RX ORDER — LEVETIRACETAM 500 MG/1
500 TABLET ORAL 2 TIMES DAILY
Status: DISCONTINUED | OUTPATIENT
Start: 2020-06-16 | End: 2020-06-17 | Stop reason: HOSPADM

## 2020-06-16 RX ORDER — ACETAMINOPHEN 650 MG/1
650 SUPPOSITORY RECTAL EVERY 6 HOURS PRN
Status: DISCONTINUED | OUTPATIENT
Start: 2020-06-16 | End: 2020-06-17 | Stop reason: HOSPADM

## 2020-06-16 RX ORDER — POLYETHYLENE GLYCOL 3350 17 G/17G
17 POWDER, FOR SOLUTION ORAL DAILY PRN
Status: DISCONTINUED | OUTPATIENT
Start: 2020-06-16 | End: 2020-06-17 | Stop reason: HOSPADM

## 2020-06-16 RX ORDER — DICYCLOMINE HYDROCHLORIDE 10 MG/1
10 CAPSULE ORAL
Status: DISCONTINUED | OUTPATIENT
Start: 2020-06-16 | End: 2020-06-17 | Stop reason: HOSPADM

## 2020-06-16 RX ORDER — ONDANSETRON 2 MG/ML
4 INJECTION INTRAMUSCULAR; INTRAVENOUS EVERY 6 HOURS PRN
Status: DISCONTINUED | OUTPATIENT
Start: 2020-06-16 | End: 2020-06-17 | Stop reason: HOSPADM

## 2020-06-16 RX ORDER — DICYCLOMINE HYDROCHLORIDE 10 MG/1
10 CAPSULE ORAL ONCE
Status: COMPLETED | OUTPATIENT
Start: 2020-06-16 | End: 2020-06-16

## 2020-06-16 RX ORDER — SODIUM CHLORIDE 0.9 % (FLUSH) 0.9 %
10 SYRINGE (ML) INJECTION EVERY 12 HOURS SCHEDULED
Status: DISCONTINUED | OUTPATIENT
Start: 2020-06-16 | End: 2020-06-17 | Stop reason: HOSPADM

## 2020-06-16 RX ADMIN — SODIUM CHLORIDE, PRESERVATIVE FREE 10 ML: 5 INJECTION INTRAVENOUS at 08:50

## 2020-06-16 RX ADMIN — OXYCODONE HYDROCHLORIDE AND ACETAMINOPHEN 1 TABLET: 5; 325 TABLET ORAL at 16:50

## 2020-06-16 RX ADMIN — LEVETIRACETAM 500 MG: 100 INJECTION, SOLUTION INTRAVENOUS at 00:38

## 2020-06-16 RX ADMIN — DICYCLOMINE HYDROCHLORIDE 10 MG: 10 CAPSULE ORAL at 15:35

## 2020-06-16 RX ADMIN — LEVETIRACETAM 500 MG: 500 TABLET ORAL at 21:41

## 2020-06-16 RX ADMIN — PAROXETINE 30 MG: 10 TABLET, FILM COATED ORAL at 08:49

## 2020-06-16 RX ADMIN — SODIUM CHLORIDE, PRESERVATIVE FREE 10 ML: 5 INJECTION INTRAVENOUS at 21:41

## 2020-06-16 RX ADMIN — DICYCLOMINE HYDROCHLORIDE 10 MG: 10 CAPSULE ORAL at 08:49

## 2020-06-16 RX ADMIN — ENOXAPARIN SODIUM 40 MG: 40 INJECTION SUBCUTANEOUS at 08:50

## 2020-06-16 RX ADMIN — OXYCODONE HYDROCHLORIDE AND ACETAMINOPHEN 1 TABLET: 5; 325 TABLET ORAL at 08:49

## 2020-06-16 RX ADMIN — LEVETIRACETAM 500 MG: 500 TABLET ORAL at 08:49

## 2020-06-16 RX ADMIN — DICYCLOMINE HYDROCHLORIDE 10 MG: 10 CAPSULE ORAL at 11:28

## 2020-06-16 ASSESSMENT — PAIN SCALES - GENERAL
PAINLEVEL_OUTOF10: 8
PAINLEVEL_OUTOF10: 8

## 2020-06-16 ASSESSMENT — PAIN DESCRIPTION - LOCATION: LOCATION: HEAD

## 2020-06-16 ASSESSMENT — PAIN DESCRIPTION - PAIN TYPE: TYPE: ACUTE PAIN

## 2020-06-16 NOTE — ED NOTES
Transported to [facility], [address] [place] [address] by [facility] in stable condition.        Richmond Smith RN  06/16/20 6668

## 2020-06-16 NOTE — CONSULTS
gums.  No nasal discharge. Neck:  Supple  Heart:  RRR, no murmurs, gallops, rubs  Lungs:  CTA bilaterally, bilat symmetrical expansion, no wheeze, rales, or rhonchi  Abdomen: Bowel sounds present, soft, nontender, no masses, no organomegaly, no peritoneal signs  Extremities:  No clubbing, cyanosis, or edema  Skin:  Warm and dry, no open lesions or rash  Breast: deferred  Rectal: deferred  Genitalia:  deferred    NEUROLOGICAL EXAM  ---------------------------------    Mental Status Exam:             Alert and oriented times three,follows commands,speech and language intact    Cranial [id number]-[id number] Intact.         Cranial nerve II           Visual acuity:  normal                 Cranial nerve III           Pupils:  equal, round, reactive to light      Cranial nerves III, IV, VI           Extraocular Movements: intact      Cranial nerve V           Facial sensation:  intact      Cranial nerve VII           Facial strength: intact      Cranial nerve VIII           Hearing:  intact      Cranial nerve IX           Palate:  intact      Cranial nerve XI         Shoulder shrug:  intact      Cranial nerve XII          Tongue movement:  normal    Motor:    Drift:  absent  Motor exam is symmetrical 5 out of 5 all extremities bilaterally  Tone:  normal  Abnormal Movements:  Absent    DTRs-2+ biceps,triceps,brachioradialis,knee jerks and ankle jerks bilaterally symmetrical.  Toes-downgoing bilaterally            Sensory:normal sensation              CBC with Differential:    Lab Results   Component Value Date    WBC 9.9 06/16/2020    RBC 4.82 06/16/2020    HGB 14.6 06/16/2020    HCT 43.8 06/16/2020     06/16/2020    MCV 90.9 06/16/2020    MCH 30.3 06/16/2020    MCHC 33.3 06/16/2020    RDW 12.7 06/16/2020    SEGSPCT 55.9 06/15/2020    LYMPHOPCT 30.8 06/15/2020    MONOPCT 8.6 06/15/2020    EOSPCT 2.4 01/15/2012    BASOPCT 0.8 06/15/2020    MONOSABS 0.9 06/15/2020    LYMPHSABS 3.2 06/15/2020    EOSABS 0.4 06/15/2020 BASOSABS 0.1 06/15/2020    DIFFTYPE AUTOMATED DIFFERENTIAL 06/15/2020     CMP:    Lab Results   Component Value Date     06/16/2020    K 4.3 06/16/2020     06/16/2020    CO2 26 06/16/2020    BUN 13 06/16/2020    CREATININE 0.8 06/16/2020    GFRAA >60 06/16/2020    LABGLOM >60 06/16/2020    GLUCOSE 102 06/16/2020    PROT 6.7 06/16/2020    PROT 7.0 01/24/2013    LABALBU 4.3 06/16/2020    CALCIUM 9.2 06/16/2020    BILITOT 0.5 06/16/2020    ALKPHOS 78 06/16/2020    AST 19 06/16/2020    ALT 21 06/16/2020     BMP:    Lab Results   Component Value Date     06/16/2020    K 4.3 06/16/2020     06/16/2020    CO2 26 06/16/2020    BUN 13 06/16/2020    LABALBU 4.3 06/16/2020    CREATININE 0.8 06/16/2020    CALCIUM 9.2 06/16/2020    GFRAA >60 06/16/2020    LABGLOM >60 06/16/2020    GLUCOSE 102 06/16/2020     PT/INR:    Lab Results   Component Value Date    PROTIME 10.3 08/11/2014    PROTIME 10.2 01/15/2012    INR 0.95 08/11/2014     PTT:  No results found for: APTT, PTT[APTT  U/A:    Lab Results   Component Value Date    COLORU LT YELLOW 04/24/2017    WBCUA 1 04/24/2017    RBCUA 1 04/24/2017    MUCUS RARE 04/24/2017    BACTERIA RARE 04/24/2017    CLARITYU CLEAR 04/24/2017    SPECGRAV 1.017 04/24/2017    LEUKOCYTESUR NEGATIVE 04/24/2017    UROBILINOGEN NORMAL 04/24/2017    BILIRUBINUR NEGATIVE 04/24/2017    BLOODU NEGATIVE 04/24/2017     TSH:  No results found for: TSH  VITAMIN B12: No components found for: B12  FOLATE:    Lab Results   Component Value Date    FOLATE 5.9 07/22/2018     RPR:  No results found for: RPR  JAMI:  No results found for: ANATITER, JAMI  Urine Toxicology:  No components found for: IAMMENTA, IBARBIT, IBENZO, ICOCAINE, IMARTHC, IOPIATES, IPHENCYC     IMPRESSION:    Seizures-complex partial seizures    PLAN:    CT brain neg    Mri brain Mra head    B 12 folate TSH    keppra    Discussed dx prognosis meds side effects and abvoe with pt and answered all questions.     Asked pt not to drive a

## 2020-06-16 NOTE — CARE COORDINATION
Patient screened for discharge needs with no needs identified at this time. Pt is from home with home with wife, has PCP, insurance with RX coverage and was independent prior to admission. However, CM available if needs arise.

## 2020-06-16 NOTE — ED NOTES
Call placed to [facility] for transfer to [facility], they will return call with hospitalisit.       Anthony Louie RN  06/16/20 2943

## 2020-06-16 NOTE — ED PROVIDER NOTES
Dose Route Frequency Provider Last Rate Last Dose    sodium chloride flush 0.9 % injection 10 mL  10 mL Intravenous 2 times per day Patrice Muñoz MD        sodium chloride flush 0.9 % injection 10 mL  10 mL Intravenous PRN Patrice Muñoz MD         Current Outpatient Medications   Medication Sig Dispense Refill    PARoxetine (PAXIL) 30 MG tablet Take 30 mg by mouth daily      levETIRAcetam (KEPPRA) 500 MG tablet Take 1 tablet by mouth 2 times daily 60 tablet 3    oxyCODONE-acetaminophen (PERCOCET) 5-325 MG per tablet Take 1 tablet by mouth every 4 hours as needed for Pain.  dicyclomine (BENTYL) 20 MG tablet Take 20 mg by mouth       Allergies   Allergen Reactions    Ambien [Zolpidem Tartrate] Other (See Comments)     hallucinations    Wellbutrin [Bupropion] Hives    Codeine Nausea And Vomiting       Nursing Notes Reviewed    Physical Exam:  ED Triage Vitals [06/15/20 3259]   Enc Vitals Group      /81      Pulse 56      Resp 12      Temp 97.7 °F (36.5 °C)      Temp Source Oral      SpO2 98 %      Weight 197 lb (89.4 kg)      Height 5' 11\" (1.803 m)      Head Circumference       Peak Flow       Pain Score       Pain Loc       Pain Edu? Excl. in [address]? My pulse ox interpretation is - normal    General appearance:  No acute distress. Appears overall nontoxic. Pleasant. Skin:  Warm. Dry. No diaphoresis. Eye:  Extraocular movements intact. Pupils equal round reactive to light. Ears, nose, mouth and throat:  Oral mucosa moist.  No large cephalhematoma, gaviria sign raccoon eyes. Neck:  Trachea midline. No meningismus. Extremity:  No swelling. Normal ROM     Heart:  Regular rate and rhythm, normal S1 & S2, no extra heart sounds. Perfusion:  Intact. Respiratory:  Lungs clear to auscultation bilaterally. Respirations nonlabored. Speaking clearly in full sentences. Abdominal:  Normal bowel sounds. Soft. Nontender. Non distended.   Back:  No CVA tenderness to palpation intracranial abnormality with no significant interval change compared to CT head from March of last year. Patient did have a seizure like episode on arrival to the emergency department a consistent with both of his hands shaking. Patient was talking during this episode and then immediately thereafter answering questions without any postictal phase. There is no tongue biting, foaming of the mouth or incontinence of urine. I did not give any medications to abort this shaking episode and it lasted approximately 30 seconds. Patient is now with a bounce back visit and with multiple reported seizures despite my suspicion for nonepileptic seizures I will admit the patient for further observation and neurology assessment. I will transfer the patient to [facility] as they have neurology for further testing or observation. Patient did receive his evening Keppra in the emergency department via  mg and is his second dose today. Patient is accepted to [facility] by Dr. Henry Eldridge. Transportation to be arranged and patient to be transferred. On recheck in the emergency department patient is resting comfortably and watching TV and all questions were answered. Questions sought and answered with the patient and wife. They voice understanding and agree with plan. Clinical Impression:  1. Seizures ([address]      Disposition referral (if applicable):  No follow-up provider specified. Disposition medications (if applicable):  New Prescriptions    No medications on file       Comment: Please note this report has been produced using speech recognition software and may contain errors related to that system including errors in grammar, punctuation, and spelling, as well as words and phrases that may be inappropriate. If there are any questions or concerns please feel free to contact the dictating provider for clarification.        Dyana Evans MD  06/16/20 2212

## 2020-06-17 VITALS
WEIGHT: 191.5 LBS | SYSTOLIC BLOOD PRESSURE: 111 MMHG | RESPIRATION RATE: 15 BRPM | HEIGHT: 71 IN | BODY MASS INDEX: 26.81 KG/M2 | OXYGEN SATURATION: 96 % | DIASTOLIC BLOOD PRESSURE: 61 MMHG | HEART RATE: 46 BPM | TEMPERATURE: 97.9 F

## 2020-06-17 LAB
EKG ATRIAL RATE: 53 BPM
EKG ATRIAL RATE: 74 BPM
EKG DIAGNOSIS: NORMAL
EKG DIAGNOSIS: NORMAL
EKG P AXIS: 54 DEGREES
EKG P AXIS: 58 DEGREES
EKG P-R INTERVAL: 130 MS
EKG P-R INTERVAL: 156 MS
EKG Q-T INTERVAL: 362 MS
EKG Q-T INTERVAL: 388 MS
EKG QRS DURATION: 94 MS
EKG QRS DURATION: 94 MS
EKG QTC CALCULATION (BAZETT): 364 MS
EKG QTC CALCULATION (BAZETT): 401 MS
EKG R AXIS: 13 DEGREES
EKG R AXIS: 40 DEGREES
EKG T AXIS: 31 DEGREES
EKG T AXIS: 59 DEGREES
EKG VENTRICULAR RATE: 53 BPM
EKG VENTRICULAR RATE: 74 BPM
FOLATE: 9.1 NG/ML (ref 3.1–17.5)
TSH HIGH SENSITIVITY: 0.75 UIU/ML (ref 0.27–4.2)
VITAMIN B-12: 379.6 PG/ML (ref 211–911)

## 2020-06-17 PROCEDURE — 36415 COLL VENOUS BLD VENIPUNCTURE: CPT

## 2020-06-17 PROCEDURE — 6370000000 HC RX 637 (ALT 250 FOR IP): Performed by: INTERNAL MEDICINE

## 2020-06-17 PROCEDURE — 96372 THER/PROPH/DIAG INJ SC/IM: CPT

## 2020-06-17 PROCEDURE — 93010 ELECTROCARDIOGRAM REPORT: CPT | Performed by: INTERNAL MEDICINE

## 2020-06-17 PROCEDURE — 84443 ASSAY THYROID STIM HORMONE: CPT

## 2020-06-17 PROCEDURE — 6360000002 HC RX W HCPCS: Performed by: PHYSICIAN ASSISTANT

## 2020-06-17 PROCEDURE — G0378 HOSPITAL OBSERVATION PER HR: HCPCS

## 2020-06-17 PROCEDURE — 6370000000 HC RX 637 (ALT 250 FOR IP): Performed by: PHYSICIAN ASSISTANT

## 2020-06-17 PROCEDURE — 2580000003 HC RX 258: Performed by: PHYSICIAN ASSISTANT

## 2020-06-17 PROCEDURE — 82746 ASSAY OF FOLIC ACID SERUM: CPT

## 2020-06-17 PROCEDURE — 82607 VITAMIN B-12: CPT

## 2020-06-17 RX ORDER — LEVETIRACETAM 500 MG/1
500 TABLET ORAL 2 TIMES DAILY
Qty: 60 TABLET | Refills: 3 | Status: SHIPPED | OUTPATIENT
Start: 2020-06-17

## 2020-06-17 RX ORDER — PAROXETINE 30 MG/1
30 TABLET, FILM COATED ORAL DAILY
Qty: 30 TABLET | Refills: 3 | Status: SHIPPED | OUTPATIENT
Start: 2020-06-18 | End: 2021-08-18

## 2020-06-17 RX ADMIN — OXYCODONE HYDROCHLORIDE AND ACETAMINOPHEN 1 TABLET: 5; 325 TABLET ORAL at 09:37

## 2020-06-17 RX ADMIN — PAROXETINE 30 MG: 10 TABLET, FILM COATED ORAL at 09:34

## 2020-06-17 RX ADMIN — DICYCLOMINE HYDROCHLORIDE 10 MG: 10 CAPSULE ORAL at 10:08

## 2020-06-17 RX ADMIN — LEVETIRACETAM 500 MG: 500 TABLET ORAL at 09:34

## 2020-06-17 RX ADMIN — SODIUM CHLORIDE, PRESERVATIVE FREE 10 ML: 5 INJECTION INTRAVENOUS at 09:35

## 2020-06-17 RX ADMIN — ENOXAPARIN SODIUM 40 MG: 40 INJECTION SUBCUTANEOUS at 09:35

## 2020-06-17 RX ADMIN — DICYCLOMINE HYDROCHLORIDE 10 MG: 10 CAPSULE ORAL at 06:17

## 2020-06-17 ASSESSMENT — PAIN SCALES - GENERAL
PAINLEVEL_OUTOF10: 7
PAINLEVEL_OUTOF10: 6

## 2020-06-17 ASSESSMENT — PAIN DESCRIPTION - LOCATION: LOCATION: HEAD

## 2020-06-17 ASSESSMENT — PAIN DESCRIPTION - DESCRIPTORS: DESCRIPTORS: HEADACHE

## 2020-06-17 ASSESSMENT — PAIN DESCRIPTION - PAIN TYPE: TYPE: ACUTE PAIN

## 2020-06-17 NOTE — DISCHARGE SUMMARY
Discharge Summary    Name:  Hayden Alfonso /Age/Sex: 1985  (30 y.o. male)   MRN & CSN:  [id number] & [id number] Admission Date/Time: 2020  3:12 AM   Attending:  Danny Mcgraw MD Discharging Physician: Danny Mcgraw MD     Hospital Course:   Hayden Alfonso is a 30 y.o.  male  who presents with Seizure ([address]    1. Seizure Disorder vs PNES  · History of Arachnoid cyst and Arnold Chiari Malformation  · Has not taker AED because of insurance issues. · Recent EEG no seizure. · Started on Keppra, continue on discharge  · Seizure precautions, neurology consult  2. Tobacco Abuse: continue Nicotine patch  3. Substance Abuse: urine toxicology screen positive for Benzo and THC  4. IBS: continue Bentyl  5. Depression: on Paroxetine    The patient expressed appropriate understanding of and agreement with the discharge recommendations, medications, and plan. Consults this admission:  IP CONSULT TO NEUROLOGY    Discharge Instruction:   Follow up appointments: Neurology  Primary care physician:  within 2 weeks    Diet:  regular diet   Activity: activity as tolerated  Disposition: Discharged to:   [x]Home, [][facility], []SNF, []Acute Rehab, []Hospice   Condition on discharge: Stable    Discharge Medications:      Shahzad Goyo   Home Medication Instructions [id number]    Printed on:20 1046   Medication Information                      dicyclomine (BENTYL) 20 MG tablet  Take 20 mg by mouth             levETIRAcetam (KEPPRA) 500 MG tablet  Take 1 tablet by mouth 2 times daily             oxyCODONE-acetaminophen (PERCOCET) 5-325 MG per tablet  Take 1 tablet by mouth every 4 hours as needed for Pain.              PARoxetine (PAXIL) 30 MG tablet  Take 1 tablet by mouth daily                 Objective Findings at Discharge:   /61   Pulse (!) 46   Temp 97.9 °F (36.6 °C) (Oral)   Resp 15   Ht 5' 11\" (1.803 m)   Wt 191 lb 8 oz (86.9 kg)   SpO2 96%   BMI 26.71 kg/m² PHYSICAL EXAM   GEN Awake male, sitting upright in bed in no apparent distress. Appears given age. EYES Pupils are equally round. No scleral erythema, discharge, or conjunctivitis. HENT Mucous membranes are moist. Oral pharynx without exudates, no evidence of thrush. NECK Supple, no apparent thyromegaly or masses. RESP Clear to auscultation, no wheezes, rales or rhonchi. Symmetric chest movement while on room air. CARDIO/VASC S1/S2 auscultated. Regular rate without appreciable murmurs, rubs, or gallops. No JVD or carotid bruits. Peripheral pulses equal bilaterally and palpable. No peripheral edema. GI Abdomen is soft without significant tenderness, masses, or guarding. Bowel sounds are normoactive. Rectal exam deferred. MSK No gross joint deformities. SKIN Normal coloration, warm, dry. NEURO Cranial nerves appear grossly intact, normal speech, no lateralizing weakness. PSYCH Awake, alert, oriented x 4. Affect appropriate. BMP/CBC  Recent Labs     06/15/20  2355 06/16/20  0331    137   K 3.8 4.3    104   CO2 31 26   BUN 13 13   CREATININE 0.7* 0.8*   WBC 10.4 9.9   HCT 45.0 43.8    257       IMAGING:  Ct Head Wo Contrast    Result Date: 6/16/2020  EXAMINATION: CT OF THE HEAD WITHOUT CONTRAST  6/16/2020 12:03 am TECHNIQUE: CT of the head was performed without the administration of intravenous contrast. Dose modulation, iterative reconstruction, and/or weight based adjustment of the mA/kV was utilized to reduce the radiation dose to as low as reasonably achievable. COMPARISON: March 13, 2019. HISTORY: ORDERING SYSTEM PROVIDED HISTORY: repeat seizures TECHNOLOGIST PROVIDED HISTORY: Reason for exam:->repeat seizures Has a \"code stroke\" or \"stroke alert\" been called? ->No Reason for Exam: Repeat seizures Acuity: Acute Type of Exam: Initial Additional signs and symptoms: Repeat seizures FINDINGS: BRAIN/VENTRICLES: There is no acute intracranial hemorrhage, mass effect or midline shift. No abnormal extra-axial fluid collection. The gray-white differentiation is maintained without evidence of an acute infarct. There is no evidence of hydrocephalus. Stable 2.2 cm focal area of extra-axial CSF prominence in the high right parietal convexity near the vertex which could represent a benign arachnoid cyst.  No definite underlying cerebral edema. Crowding of the cerebellar tonsils at the foramen magnum is similar to the prior study. ORBITS: The visualized portion of the orbits demonstrate no acute abnormality. SINUSES: The visualized paranasal sinuses and mastoid air cells demonstrate no acute abnormality. SOFT TISSUES/SKULL:  No acute abnormality of the visualized skull or soft tissues. No acute intracranial abnormality. No significant interval change compared to CT head done March 13, 2019. Mra Head Wo Contrast    Result Date: 6/16/2020  EXAMINATION: MRI OF THE BRAIN WITHOUT CONTRAST AND MRA HEAD WITHOUT CONTRAST 6/16/2020 5:58 pm; 6/16/2020 5:59 pm TECHNIQUE: Multiplanar multisequence MRI of the brain was performed without the administration of intravenous contrast. MRA of the head was performed utilizing time-of-flight imaging with MIP images. No intravenous contrast was administered. COMPARISON: None. HISTORY: ORDERING SYSTEM PROVIDED HISTORY: seizures TECHNOLOGIST PROVIDED HISTORY: Reason for exam:->seizures Reason for Exam: Seizure, No Surg; ORDERING SYSTEM PROVIDED HISTORY: R/O ANEURYSM TECHNOLOGIST PROVIDED HISTORY: Reason for exam:->R/O ANEURYSM Reason for Exam: Seizure, No Surg FINDINGS: MRI BRAIN: INTRACRANIAL STRUCTURES/VENTRICLES: There is no acute infarct. No mass effect or midline shift. No evidence of an acute intracranial hemorrhage. The ventricles and sulci are normal in size and configuration. The sellar/suprasellar regions appear unremarkable. The normal signal voids within the major intracranial vessels appear maintained.   There is a small extra-axial fluid collection

## 2020-06-17 NOTE — PROGRESS NOTES
The visualized portion of the orbits demonstrate no acute abnormality. SINUSES: The visualized paranasal sinuses and mastoid air cells demonstrate no acute abnormality. SOFT TISSUES/SKULL:  No acute abnormality of the visualized skull or soft tissues. No acute intracranial abnormality. No significant interval change compared to CT head done March 13, 2019. Mra Head Wo Contrast    Result Date: 6/16/2020  EXAMINATION: MRI OF THE BRAIN WITHOUT CONTRAST AND MRA HEAD WITHOUT CONTRAST 6/16/2020 5:58 pm; 6/16/2020 5:59 pm TECHNIQUE: Multiplanar multisequence MRI of the brain was performed without the administration of intravenous contrast. MRA of the head was performed utilizing time-of-flight imaging with MIP images. No intravenous contrast was administered. COMPARISON: None. HISTORY: ORDERING SYSTEM PROVIDED HISTORY: seizures TECHNOLOGIST PROVIDED HISTORY: Reason for exam:->seizures Reason for Exam: Seizure, No Surg; ORDERING SYSTEM PROVIDED HISTORY: R/O ANEURYSM TECHNOLOGIST PROVIDED HISTORY: Reason for exam:->R/O ANEURYSM Reason for Exam: Seizure, No Surg FINDINGS: MRI BRAIN: INTRACRANIAL STRUCTURES/VENTRICLES: There is no acute infarct. No mass effect or midline shift. No evidence of an acute intracranial hemorrhage. The ventricles and sulci are normal in size and configuration. The sellar/suprasellar regions appear unremarkable. The normal signal voids within the major intracranial vessels appear maintained. There is a small extra-axial fluid collection over the right parietal lobe follows CSF signature on all pulse sequences, consistent with an arachnoid cyst, measuring up to 3.4 cm in diameter. ORBITS: The visualized portion of the orbits demonstrate no acute abnormality. SINUSES: The visualized paranasal sinuses and mastoid air cells are well aerated. BONES/SOFT TISSUES: The bone marrow signal intensity appears normal. The soft tissues demonstrate no acute abnormality.  MRA HEAD: ANTERIOR CIRCULATION: abnormality. MRA HEAD: ANTERIOR CIRCULATION: No significant stenosis of the intracranial internal carotid, anterior cerebral, or middle cerebral arteries. A right-sided posterior communicating artery is present. POSTERIOR CIRCULATION: No significant stenosis of the vertebral, basilar, or posterior cerebral arteries. ANEURYSM: No intracranial aneurysm is seen. No acute intracranial abnormality visualized. Normal appearing [id number].        LAB RESULTS  --------------------    Recent Results (from the past 24 hour(s))   Vitamin B12 & Folate    Collection Time: 06/17/20  3:45 AM   Result Value Ref Range    Vitamin B-12 379.6 211 - 911 pg/ml    Folate 9.1 3.1 - 17.5 NG/ML   TSH without Reflex    Collection Time: 06/17/20  3:45 AM   Result Value Ref Range    TSH, High Sensitivity 0.751 0.270 - 4.20 uIu/ml         Medical problems    Patient Active Problem List:     Recurrent seizures ([address]     Seizures ([address]     Arachnoid cyst     Seizure ([address]      ASSESSMENT:  ---------------------    Seizures-complex partial seizures     PLAN:     CT brain neg     Mri brain Mra head neg     B 12 folate TSH nl     keppra     Discussed dx prognosis meds side effects and abvoe with pt and answered all questions.     Asked pt not to drive a vehicle for 6 months of seizure free period.           Electronically signed by Han Martinez MD on 6/17/2020 at 12:43 PM

## 2020-06-17 NOTE — PLAN OF CARE
Self-Concept:  Goal: Level of anxiety will decrease  Description: Level of anxiety will decrease  6/17/2020 0949 by Rosamaria Armstrong LPN  Outcome: Completed  6/17/2020 0129 by Cathryne Schaumann, RN  Outcome: Ongoing  Goal: Ability to verbalize feelings about condition will improve  Description: Ability to verbalize feelings about condition will improve  6/17/2020 0949 by Rosamaria Armstrong LPN  Outcome: Completed  6/17/2020 0129 by Cathryne Schaumann, RN  Outcome: Ongoing     Problem: Pain:  Goal: Pain level will decrease  Description: Pain level will decrease  6/17/2020 0949 by Rosamaria Armstrong LPN  Outcome: Completed  6/17/2020 0129 by Cathryne Schaumann, RN  Outcome: Ongoing  Goal: Control of acute pain  Description: Control of acute pain  6/17/2020 0949 by Rosamaria Armstrong LPN  Outcome: Completed  6/17/2020 0129 by Cathryne Schaumann, RN  Outcome: Ongoing  Goal: Control of chronic pain  Description: Control of chronic pain  6/17/2020 0949 by Rosamaria Armstrong LPN  Outcome: Completed  6/17/2020 0129 by Cathryne Schaumann, RN  Outcome: Ongoing

## 2020-12-13 ENCOUNTER — APPOINTMENT (OUTPATIENT)
Dept: GENERAL RADIOLOGY | Age: 35
End: 2020-12-13

## 2020-12-13 ENCOUNTER — HOSPITAL ENCOUNTER (OUTPATIENT)
Age: 35
Setting detail: OBSERVATION
Discharge: HOME | End: 2020-12-14
Attending: EMERGENCY MEDICINE | Admitting: INTERNAL MEDICINE
Payer: COMMERCIAL

## 2020-12-13 PROBLEM — R07.9 CHEST PAIN: Status: ACTIVE | Noted: 2020-12-13

## 2020-12-13 LAB
ALBUMIN SERPL-MCNC: 4.4 GM/DL (ref 3.4–5)
ALP BLD-CCNC: 78 IU/L (ref 40–129)
ALT SERPL-CCNC: 20 U/L (ref 10–40)
ANION GAP SERPL CALCULATED.3IONS-SCNC: 2 MMOL/L (ref 4–16)
AST SERPL-CCNC: 19 IU/L (ref 15–37)
BASOPHILS ABSOLUTE: 0.1 K/CU MM
BASOPHILS RELATIVE PERCENT: 0.6 % (ref 0–1)
BILIRUB SERPL-MCNC: 0.8 MG/DL (ref 0–1)
BUN BLDV-MCNC: 17 MG/DL (ref 6–23)
CALCIUM SERPL-MCNC: 8.8 MG/DL (ref 8.3–10.6)
CHLORIDE BLD-SCNC: 102 MMOL/L (ref 99–110)
CO2: 34 MMOL/L (ref 21–32)
CREAT SERPL-MCNC: 0.8 MG/DL (ref 0.9–1.3)
D DIMER: <200 NG/ML(DDU)
DIFFERENTIAL TYPE: ABNORMAL
EOSINOPHILS ABSOLUTE: 0.1 K/CU MM
EOSINOPHILS RELATIVE PERCENT: 1.2 % (ref 0–3)
GFR AFRICAN AMERICAN: >60 ML/MIN/1.73M2
GFR NON-AFRICAN AMERICAN: >60 ML/MIN/1.73M2
GLUCOSE BLD-MCNC: 109 MG/DL (ref 70–99)
HCT VFR BLD CALC: 43.3 % (ref 42–52)
HEMOGLOBIN: 14.4 GM/DL (ref 13.5–18)
IMMATURE NEUTROPHIL %: 0.2 % (ref 0–0.43)
LYMPHOCYTES ABSOLUTE: 1.9 K/CU MM
LYMPHOCYTES RELATIVE PERCENT: 20.5 % (ref 24–44)
MCH RBC QN AUTO: 29.6 PG (ref 27–31)
MCHC RBC AUTO-ENTMCNC: 33.3 % (ref 32–36)
MCV RBC AUTO: 88.9 FL (ref 78–100)
MONOCYTES ABSOLUTE: 0.6 K/CU MM
MONOCYTES RELATIVE PERCENT: 6.3 % (ref 0–4)
PDW BLD-RTO: 12.6 % (ref 11.7–14.9)
PLATELET # BLD: 243 K/CU MM (ref 140–440)
PMV BLD AUTO: 11.5 FL (ref 7.5–11.1)
POTASSIUM SERPL-SCNC: 3.7 MMOL/L (ref 3.5–5.1)
RBC # BLD: 4.87 M/CU MM (ref 4.6–6.2)
SEGMENTED NEUTROPHILS ABSOLUTE COUNT: 6.4 K/CU MM
SEGMENTED NEUTROPHILS RELATIVE PERCENT: 71.2 % (ref 36–66)
SODIUM BLD-SCNC: 138 MMOL/L (ref 135–145)
TOTAL IMMATURE NEUTOROPHIL: 0.02 K/CU MM
TOTAL PROTEIN: 6.7 GM/DL (ref 6.4–8.2)
TROPONIN T: <0.01 NG/ML
WBC # BLD: 9 K/CU MM (ref 4–10.5)

## 2020-12-13 PROCEDURE — 71046 X-RAY EXAM CHEST 2 VIEWS: CPT

## 2020-12-13 PROCEDURE — 6370000000 HC RX 637 (ALT 250 FOR IP): Performed by: EMERGENCY MEDICINE

## 2020-12-13 PROCEDURE — G0378 HOSPITAL OBSERVATION PER HR: HCPCS

## 2020-12-13 PROCEDURE — 6370000000 HC RX 637 (ALT 250 FOR IP): Performed by: INTERNAL MEDICINE

## 2020-12-13 PROCEDURE — 84484 ASSAY OF TROPONIN QUANT: CPT

## 2020-12-13 PROCEDURE — 2580000003 HC RX 258: Performed by: INTERNAL MEDICINE

## 2020-12-13 PROCEDURE — 85379 FIBRIN DEGRADATION QUANT: CPT

## 2020-12-13 PROCEDURE — 93005 ELECTROCARDIOGRAM TRACING: CPT | Performed by: EMERGENCY MEDICINE

## 2020-12-13 PROCEDURE — 85025 COMPLETE CBC W/AUTO DIFF WBC: CPT

## 2020-12-13 PROCEDURE — 36415 COLL VENOUS BLD VENIPUNCTURE: CPT

## 2020-12-13 PROCEDURE — 99285 EMERGENCY DEPT VISIT HI MDM: CPT

## 2020-12-13 PROCEDURE — 80053 COMPREHEN METABOLIC PANEL: CPT

## 2020-12-13 PROCEDURE — 93005 ELECTROCARDIOGRAM TRACING: CPT | Performed by: INTERNAL MEDICINE

## 2020-12-13 RX ORDER — PROMETHAZINE HYDROCHLORIDE 12.5 MG/1
12.5 TABLET ORAL EVERY 6 HOURS PRN
Status: DISCONTINUED | OUTPATIENT
Start: 2020-12-13 | End: 2020-12-14 | Stop reason: HOSPADM

## 2020-12-13 RX ORDER — SODIUM CHLORIDE 0.9 % (FLUSH) 0.9 %
10 SYRINGE (ML) INJECTION PRN
Status: DISCONTINUED | OUTPATIENT
Start: 2020-12-13 | End: 2020-12-14 | Stop reason: HOSPADM

## 2020-12-13 RX ORDER — OXYCODONE HYDROCHLORIDE AND ACETAMINOPHEN 5; 325 MG/1; MG/1
1 TABLET ORAL EVERY 8 HOURS PRN
Status: DISCONTINUED | OUTPATIENT
Start: 2020-12-13 | End: 2020-12-14 | Stop reason: HOSPADM

## 2020-12-13 RX ORDER — ASPIRIN 81 MG/1
81 TABLET, CHEWABLE ORAL DAILY
Status: DISCONTINUED | OUTPATIENT
Start: 2020-12-14 | End: 2020-12-14 | Stop reason: HOSPADM

## 2020-12-13 RX ORDER — ASPIRIN 81 MG/1
324 TABLET, CHEWABLE ORAL ONCE
Status: COMPLETED | OUTPATIENT
Start: 2020-12-13 | End: 2020-12-13

## 2020-12-13 RX ORDER — ONDANSETRON 2 MG/ML
4 INJECTION INTRAMUSCULAR; INTRAVENOUS EVERY 6 HOURS PRN
Status: DISCONTINUED | OUTPATIENT
Start: 2020-12-13 | End: 2020-12-14 | Stop reason: HOSPADM

## 2020-12-13 RX ORDER — ACETAMINOPHEN 325 MG/1
650 TABLET ORAL EVERY 6 HOURS PRN
Status: DISCONTINUED | OUTPATIENT
Start: 2020-12-13 | End: 2020-12-14 | Stop reason: HOSPADM

## 2020-12-13 RX ORDER — LEVETIRACETAM 500 MG/1
500 TABLET ORAL 2 TIMES DAILY
Status: DISCONTINUED | OUTPATIENT
Start: 2020-12-13 | End: 2020-12-14 | Stop reason: HOSPADM

## 2020-12-13 RX ORDER — NITROGLYCERIN 0.4 MG/1
0.4 TABLET SUBLINGUAL EVERY 5 MIN PRN
Status: DISCONTINUED | OUTPATIENT
Start: 2020-12-13 | End: 2020-12-14 | Stop reason: HOSPADM

## 2020-12-13 RX ORDER — DICYCLOMINE HCL 20 MG
20 TABLET ORAL 3 TIMES DAILY PRN
Status: DISCONTINUED | OUTPATIENT
Start: 2020-12-13 | End: 2020-12-14 | Stop reason: HOSPADM

## 2020-12-13 RX ORDER — SODIUM CHLORIDE 0.9 % (FLUSH) 0.9 %
10 SYRINGE (ML) INJECTION EVERY 12 HOURS SCHEDULED
Status: DISCONTINUED | OUTPATIENT
Start: 2020-12-13 | End: 2020-12-14 | Stop reason: HOSPADM

## 2020-12-13 RX ORDER — ACETAMINOPHEN 650 MG/1
650 SUPPOSITORY RECTAL EVERY 6 HOURS PRN
Status: DISCONTINUED | OUTPATIENT
Start: 2020-12-13 | End: 2020-12-14 | Stop reason: HOSPADM

## 2020-12-13 RX ORDER — POLYETHYLENE GLYCOL 3350 17 G/17G
17 POWDER, FOR SOLUTION ORAL DAILY PRN
Status: DISCONTINUED | OUTPATIENT
Start: 2020-12-13 | End: 2020-12-14 | Stop reason: HOSPADM

## 2020-12-13 RX ADMIN — NITROGLYCERIN 0.4 MG: 0.4 TABLET, ORALLY DISINTEGRATING SUBLINGUAL at 15:20

## 2020-12-13 RX ADMIN — ACETAMINOPHEN 650 MG: 325 TABLET ORAL at 20:09

## 2020-12-13 RX ADMIN — ASPIRIN 81 MG 324 MG: 81 TABLET ORAL at 10:05

## 2020-12-13 RX ADMIN — SODIUM CHLORIDE, PRESERVATIVE FREE 10 ML: 5 INJECTION INTRAVENOUS at 20:09

## 2020-12-13 RX ADMIN — OXYCODONE AND ACETAMINOPHEN 1 TABLET: 5; 325 TABLET ORAL at 15:21

## 2020-12-13 RX ADMIN — NITROGLYCERIN 1 INCH: 20 OINTMENT TOPICAL at 11:36

## 2020-12-13 RX ADMIN — LEVETIRACETAM 500 MG: 500 TABLET, FILM COATED ORAL at 20:09

## 2020-12-13 RX ADMIN — NITROGLYCERIN 0.4 MG: 0.4 TABLET, ORALLY DISINTEGRATING SUBLINGUAL at 10:04

## 2020-12-13 RX ADMIN — BISMUTH SUBSALICYLATE 30 ML: 525 LIQUID ORAL at 15:40

## 2020-12-13 RX ADMIN — PAROXETINE HYDROCHLORIDE 30 MG: 20 TABLET, FILM COATED ORAL at 15:20

## 2020-12-13 RX ADMIN — DICYCLOMINE HYDROCHLORIDE 20 MG: 20 TABLET ORAL at 15:20

## 2020-12-13 ASSESSMENT — ENCOUNTER SYMPTOMS
VOMITING: 0
SORE THROAT: 0
FACIAL SWELLING: 0
WHEEZING: 0
STRIDOR: 0
ANAL BLEEDING: 0
CONSTIPATION: 0
EYE PAIN: 0
PHOTOPHOBIA: 0
VOICE CHANGE: 0
EYE ITCHING: 0
EYE REDNESS: 0
SINUS PRESSURE: 0
DIARRHEA: 0
TROUBLE SWALLOWING: 0
ABDOMINAL PAIN: 0
NAUSEA: 1
RHINORRHEA: 0
CHEST TIGHTNESS: 0
ABDOMINAL DISTENTION: 0
EYE DISCHARGE: 0
BLOOD IN STOOL: 0
SHORTNESS OF BREATH: 1
COUGH: 0
BACK PAIN: 0

## 2020-12-13 ASSESSMENT — PAIN DESCRIPTION - LOCATION
LOCATION: CHEST
LOCATION: CHEST
LOCATION: CHEST;BACK
LOCATION: CHEST

## 2020-12-13 ASSESSMENT — PAIN - FUNCTIONAL ASSESSMENT
PAIN_FUNCTIONAL_ASSESSMENT: ACTIVITIES ARE NOT PREVENTED
PAIN_FUNCTIONAL_ASSESSMENT: ACTIVITIES ARE NOT PREVENTED

## 2020-12-13 ASSESSMENT — PAIN DESCRIPTION - ORIENTATION
ORIENTATION: MID

## 2020-12-13 ASSESSMENT — PAIN SCALES - GENERAL
PAINLEVEL_OUTOF10: 2
PAINLEVEL_OUTOF10: 4
PAINLEVEL_OUTOF10: 6
PAINLEVEL_OUTOF10: 3
PAINLEVEL_OUTOF10: 2
PAINLEVEL_OUTOF10: 8

## 2020-12-13 ASSESSMENT — PAIN DESCRIPTION - ONSET
ONSET: ON-GOING

## 2020-12-13 ASSESSMENT — PAIN DESCRIPTION - DESCRIPTORS
DESCRIPTORS: PRESSURE
DESCRIPTORS: ACHING
DESCRIPTORS: PRESSURE;SHARP
DESCRIPTORS: PRESSURE

## 2020-12-13 ASSESSMENT — HEART SCORE: ECG: 1

## 2020-12-13 ASSESSMENT — PAIN DESCRIPTION - PAIN TYPE
TYPE: ACUTE PAIN

## 2020-12-13 ASSESSMENT — PAIN DESCRIPTION - PROGRESSION
CLINICAL_PROGRESSION: GRADUALLY IMPROVING
CLINICAL_PROGRESSION: GRADUALLY IMPROVING
CLINICAL_PROGRESSION: RAPIDLY WORSENING

## 2020-12-13 ASSESSMENT — PAIN DESCRIPTION - FREQUENCY
FREQUENCY: CONTINUOUS

## 2020-12-13 NOTE — ED NOTES
Patient rating chest pain at 7/10 before NTG.                      Carissa Briseno RN  12/13/20 1941

## 2020-12-13 NOTE — ED NOTES
Patient rating his pain at 5/10 after one NTG. The patient went to x ray in a wheel chair with the tech.       Arlys Duane, RN  12/13/20 9770

## 2020-12-13 NOTE — LETTER
[facility]  [address] [place] [address]  Phone: [phone]             December 14, 2020    Patient: Ryan Payer   YOB: 1985   Date of Visit: 12/13/2020       To Whom It May Concern:    Quinn Garcia was seen and treated in our facility  beginning 12/13/2020 until 12/14/2020. He may return to work on 12/15/2020.  If you have any questions please call [facility] at [phone]    Sincerely,       Dang Riddle RN         Signature:__________________________________

## 2020-12-13 NOTE — LETTER
[facility]  [address]  [place] [address]  Phone: [phone]             December 14, 2020    Patient: Joaquín Arias   YOB: 1985   Date of Visit: 12/13/2020       To Whom It May Concern:    Deanna Cortez was seen and treated in our facility  beginning 12/13/2020 until 12/14/2020. He may return to work on 12/15/2020 with no restrictions.   If you have any questions please call [facility] at [phone]    Sincerely,       Cayetano Dennis RN         Signature:__________________________________

## 2020-12-13 NOTE — ED NOTES
The patient fell asleep and his heart rate dropped to 46, monitor alarm woke him up.      Nickie Alicia RN  12/13/20 9955

## 2020-12-13 NOTE — H&P
Alysa Cortes is a 37 y.o.  male  who presents with substernal chest pain while at work. States pain was 10 out of 10 sharp rating to the back. Denies shortness of breath with it. Some diaphoresis. Patient notified his supervisor who gave him aspirin told to come to the ED for evaluation. Patient states chest pain had resolved prior to arrival.  Did get nitroglycerin in the ED. Work-up at that time was negative. However, due to family history and smoking it was decided patient should come in for observation serial troponins on telemetry. Patient agrees with same. Denies headache blurred vision dizziness. Denies any chest pain palpitations or shortness of breath at this time. Denies any fever chills nausea or vomiting. 10-14 point ROS reviewed negative, unless as noted above    Objective: Intake/Output Summary (Last 24 hours) at 12/13/2020 1840  Last data filed at 12/13/2020 1759  Gross per 24 hour   Intake 240 ml   Output    Net 240 ml      Vitals:   Vitals:    12/13/20 1300   BP: 105/73   Pulse: 62   Resp: 18   Temp: 97.9 °F (36.6 °C)   SpO2: 99%     Physical Exam:    GEN Awake male, sitting upright in bed in no apparent distress. Appears given age. EYES Pupils are equally round. No scleral erythema, discharge, or conjunctivitis. HENT Mucous membranes are moist.   NECK No apparent thyromegaly or masses. RESP Clear to auscultation, no wheezes, rales or rhonchi. Symmetric chest movement while on room air. CARDIO/VASC S1/S2 auscultated. Regular rate without appreciable murmurs, rubs, or gallops. Peripheral pulses equal bilaterally and palpable. No peripheral edema. GI Abdomen is soft without significant tenderness, masses, or guarding. Bowel sounds are normoactive. Rectal exam deferred.  Ambrosio catheter is not present. HEME/LYMPH No petechiae or ecchymoses. MSK No gross joint deformities. Spontaneous movement of all extremities  SKIN Normal coloration, warm, dry. NEURO Cranial nerves appear grossly intact, normal speech, no lateralizing weakness. PSYCH Awake, alert, oriented x 4. Affect appropriate. Past Medical History:      Past Medical History:   Diagnosis Date    Arachnoid cyst     Arthritis     Chiari malformation type I ([address]     Depression     IBS (irritable bowel syndrome)     Migraines     Seizures (HCC)      PSHX:  has a past surgical history that includes Appendectomy and Nasal septum surgery. Allergies:    Allergies   Allergen Reactions    Ambien [Zolpidem Tartrate] Other (See Comments)     hallucinations    Wellbutrin [Bupropion] Hives    Codeine Nausea And Vomiting       FAM HX: Mother's first heart attack at 28years of age, father multiple heart attacks both still alive in their 62s per patient  Soc HX:   Social History     Socioeconomic History    Marital status:      Spouse name: Kale Suarez Number of children: 10    Years of education: None    Highest education level: None   Occupational History    None   Social Needs    Financial resource strain: None    Food insecurity     Worry: None     Inability: None    Transportation needs     Medical: None     Non-medical: None   Tobacco Use    Smoking status: Former Smoker     Packs/day: 1.00     Years: 5.00     Pack years: 5.00     Types: Cigarettes     Quit date: 12/10/2020    Smokeless tobacco: Never Used   Substance and Sexual Activity    Alcohol use: Yes     Comment: socially    Drug use: Not Currently     Types: Marijuana    Sexual activity: Yes     Partners: Female   Lifestyle    Physical activity     Days per week: None     Minutes per session: None    Stress: None   Relationships    Social connections     Talks on phone: None     Gets together: None     Attends Christianity service: None     Active member of club or organization: None     Attends meetings of clubs or organizations: None     Relationship status: None    Intimate partner violence Fear of current or ex partner: None     Emotionally abused: None     Physically abused: None     Forced sexual activity: None   Other Topics Concern    None   Social History Narrative    None       Medications:   Medications:    levETIRAcetam  500 mg Oral BID    PARoxetine  30 mg Oral Daily    sodium chloride flush  10 mL Intravenous 2 times per day    [START ON 12/14/2020] aspirin  81 mg Oral Daily      Infusions:   PRN Meds:     nitroGLYCERIN, 0.4 mg, Q5 Min PRN      dicyclomine, 20 mg, TID PRN      oxyCODONE-acetaminophen, 1 tablet, Q8H PRN      bismuth subsalicylate, 30 mL, Q7U PRN      sodium chloride flush, 10 mL, PRN      promethazine, 12.5 mg, Q6H PRN    Or      ondansetron, 4 mg, Q6H PRN      acetaminophen, 650 mg, Q6H PRN    Or      acetaminophen, 650 mg, Q6H PRN      polyethylene glycol, 17 g, Daily PRN    Chest x-ray on intake:  FINDINGS:   Overlying heart monitor leads.       Clear lungs.  No definite findings of pneumothorax or pleural effusion. Normal mediastinal, hilar, and cardiac contours.  No obvious acute fracture.    Joints maintain anatomic alignment.           Impression   No acute findings in the chest.               Electronically signed by Amparo Bustos MD on 12/13/2020 at 6:40 PM

## 2020-12-14 VITALS
HEIGHT: 71 IN | OXYGEN SATURATION: 96 % | WEIGHT: 180 LBS | DIASTOLIC BLOOD PRESSURE: 54 MMHG | SYSTOLIC BLOOD PRESSURE: 113 MMHG | TEMPERATURE: 97.1 F | HEART RATE: 54 BPM | RESPIRATION RATE: 16 BRPM | BODY MASS INDEX: 25.2 KG/M2

## 2020-12-14 PROBLEM — R07.9 CHEST PAIN: Status: RESOLVED | Noted: 2020-12-13 | Resolved: 2020-12-14

## 2020-12-14 LAB
EKG ATRIAL RATE: 42 BPM
EKG ATRIAL RATE: 68 BPM
EKG ATRIAL RATE: 77 BPM
EKG DIAGNOSIS: NORMAL
EKG P AXIS: 37 DEGREES
EKG P AXIS: 58 DEGREES
EKG P AXIS: 60 DEGREES
EKG P-R INTERVAL: 134 MS
EKG P-R INTERVAL: 142 MS
EKG P-R INTERVAL: 142 MS
EKG Q-T INTERVAL: 368 MS
EKG Q-T INTERVAL: 368 MS
EKG Q-T INTERVAL: 430 MS
EKG QRS DURATION: 102 MS
EKG QRS DURATION: 80 MS
EKG QRS DURATION: 90 MS
EKG QTC CALCULATION (BAZETT): 359 MS
EKG QTC CALCULATION (BAZETT): 391 MS
EKG QTC CALCULATION (BAZETT): 416 MS
EKG R AXIS: 16 DEGREES
EKG R AXIS: 31 DEGREES
EKG R AXIS: 4 DEGREES
EKG T AXIS: 41 DEGREES
EKG T AXIS: 51 DEGREES
EKG T AXIS: 53 DEGREES
EKG VENTRICULAR RATE: 42 BPM
EKG VENTRICULAR RATE: 68 BPM
EKG VENTRICULAR RATE: 77 BPM
HCT VFR BLD CALC: 43.2 % (ref 42–52)
HEMOGLOBIN: 14.3 GM/DL (ref 13.5–18)
MCH RBC QN AUTO: 29.9 PG (ref 27–31)
MCHC RBC AUTO-ENTMCNC: 33.1 % (ref 32–36)
MCV RBC AUTO: 90.4 FL (ref 78–100)
PDW BLD-RTO: 12.9 % (ref 11.7–14.9)
PLATELET # BLD: 233 K/CU MM (ref 140–440)
PMV BLD AUTO: 11.8 FL (ref 7.5–11.1)
RBC # BLD: 4.78 M/CU MM (ref 4.6–6.2)
WBC # BLD: 8.7 K/CU MM (ref 4–10.5)

## 2020-12-14 PROCEDURE — 93010 ELECTROCARDIOGRAM REPORT: CPT | Performed by: INTERNAL MEDICINE

## 2020-12-14 PROCEDURE — 85027 COMPLETE CBC AUTOMATED: CPT

## 2020-12-14 PROCEDURE — G0378 HOSPITAL OBSERVATION PER HR: HCPCS

## 2020-12-14 PROCEDURE — 93005 ELECTROCARDIOGRAM TRACING: CPT | Performed by: INTERNAL MEDICINE

## 2020-12-14 PROCEDURE — 6370000000 HC RX 637 (ALT 250 FOR IP): Performed by: INTERNAL MEDICINE

## 2020-12-14 PROCEDURE — 36415 COLL VENOUS BLD VENIPUNCTURE: CPT

## 2020-12-14 RX ADMIN — LEVETIRACETAM 500 MG: 500 TABLET, FILM COATED ORAL at 08:49

## 2020-12-14 RX ADMIN — PAROXETINE HYDROCHLORIDE 30 MG: 20 TABLET, FILM COATED ORAL at 08:49

## 2020-12-14 RX ADMIN — DICYCLOMINE HYDROCHLORIDE 20 MG: 20 TABLET ORAL at 07:50

## 2020-12-14 RX ADMIN — ASPIRIN 81 MG 81 MG: 81 TABLET ORAL at 08:49

## 2020-12-14 ASSESSMENT — PAIN SCALES - GENERAL
PAINLEVEL_OUTOF10: 0

## 2020-12-14 NOTE — DISCHARGE SUMMARY
Discharge Summary    Name:  Christos Yañez /Age/Sex: 1985  (37 y.o. male)   MRN & CSN:  [id number] & [id number] Admission Date/Time: 2020  9:42 AM   Attending:  Atif Phillips MD Discharging Physician: Atif Phillips MD     HPI:   37 y.o.  white male  who presents with substernal chest pain while at work. States pain was 10 out of 10 sharp rating to the back. Denies shortness of breath with it. Some diaphoresis. Patient notified his supervisor who gave him aspirin told to come to the ED for evaluation. Patient states chest pain had resolved prior to arrival.  Did get nitroglycerin in the ED. Work-up at that time was negative. However, due to family history and smoking it was decided patient should come in for observation serial troponins on telemetry. Patient agrees with same. Denies headache blurred vision dizziness. Denies any chest pain palpitations or shortness of breath at this time. Denies any fever chills nausea or vomiting.     Hospital Course: 1.  Chest pain: Patient states chest pain this a.m. while working nonexertional activities. Sharp radiating to the back with some nausea. Family history and smoking primary concern. Patient had EKG with normal sinus rhythm no acute ST-T wave abnormalities. Patient troponins negative x2. Approximately 40 minutes after admission patient started having same typical chest pain. EKG was normal sinus rhythm again with no ST T wave abnormalities. Patient did receive sublingual nitroglycerin that caused more of a headache. Pepto-Bismol improved patient's pain possibly GERD component. No acute events overnight. EKG this a.m. at 0600 hrs. patient was sleeping heart rate of 42 IL interval and QRS duration normal no axis deviation. Sinus bradycardia with shortened QT. Patient asymptomatic has a history of low heart rate he describes. EKG x2 yesterday while patient was awake normal sinus rhythm normal ECG. Plan discharge patient to follow-up with primary care.     2.  Seizures: No known seizures recently. Patient on Keppra  continued same.      3.  Previous tobacco abuse: Patient stop same. Encouraged to continue.     4.  Depression: Controlled patient states Paxil working continue same on discharge. 5.  IBS: Controlled. No issues during hospital stay. Continue Bentyl as needed      The patient expressed appropriate understanding of and agreement with the discharge recommendations, medications, and plan.      Consults this admission:  [facility] HOSPITAL[facility]    Discharge Instruction:   Follow up appointments: Primary care physician:  within 2 weeks    Diet:  low fat, low cholesterol diet   Activity: activity as tolerated  Disposition: Discharged to:   [x]Home, [][facility], []SNF, []Acute Rehab, []Hospice   Condition on discharge: Stable    Discharge Medications:      Carlos Salazar   Home Medication Instructions NWW:[id number]    Printed on:12/14/20 0390   Medication Information dicyclomine (BENTYL) 20 MG tablet  Take 20 mg by mouth 3 times daily as needed              levETIRAcetam (KEPPRA) 500 MG tablet  Take 1 tablet by mouth 2 times daily             oxyCODONE-acetaminophen (PERCOCET) 5-325 MG per tablet  Take 1 tablet by mouth every 4 hours as needed for Pain. PARoxetine (PAXIL) 30 MG tablet  Take 1 tablet by mouth daily                 Objective Findings at Discharge:   BP (!) 113/54   Pulse 54   Temp 97.1 °F (36.2 °C) (Oral)   Resp 16   Ht 5' 11\" (1.803 m)   Wt 180 lb (81.6 kg)   SpO2 96%   BMI 25.10 kg/m²            PHYSICAL EXAM   GEN Awake well-nourished well-developed white male, sitting upright in bed in no apparent distress. Appears given age. EYES Pupils are equally round. No scleral erythema, discharge, or conjunctivitis. HENT Mucous membranes are moist.   NECK No apparent thyromegaly or masses. RESP Clear to auscultation, no wheezes, rales or rhonchi. Symmetric chest movement while on room air. CARDIO/VASC S1/S2 auscultated. Regular rate without appreciable murmurs, rubs, or gallops. Peripheral pulses equal bilaterally and palpable. No peripheral edema. GI Abdomen is soft without significant tenderness, masses, or guarding. Bowel sounds are normoactive. Rectal exam deferred.  Ambrosio catheter is not present. HEME/LYMPH No petechiae or ecchymoses. MSK No gross joint deformities. Spontaneous movement of all extremities  SKIN Normal coloration, warm, dry. NEURO Cranial nerves appear grossly intact, normal speech, no lateralizing weakness. PSYCH Awake, alert, oriented x 4. Affect appropriate.       BMP/CBC  Recent Labs     12/13/20  1000 12/13/20  1030 12/14/20  0530     --   --    K 3.7  --   --      --   --    CO2 34*  --   --    BUN 17  --   --    CREATININE 0.8*  --   --    WBC  --  9.0 8.7   HCT  --  43.3 43.2   PLT  --  243 233       IMAGING: Chest x-ray on intake:  FINDINGS:   Overlying heart monitor leads.     Clear lungs.  No definite findings of pneumothorax or pleural effusion. Normal mediastinal, hilar, and cardiac contours.  No obvious acute fracture.    Joints maintain anatomic alignment.           Impression   No acute findings in the chest.         Discharge Time of 25 minutes    Electronically signed by Atif Phillips MD on 12/14/2020 at 9:45 AM

## 2020-12-14 NOTE — CARE COORDINATION
CM met with the patient for discharge planning. Patient lives at home with his spouse and children, does not have medical insurance, does have a PCP, is independent with ADL's, is employed, and drives. Patient did not require the use of any assistive devices or home oxygen prior to admission. Patient plans to return home upon discharge and is unable to identify any needs at this time. CM available if needs arise.

## 2021-01-23 ENCOUNTER — HOSPITAL ENCOUNTER (EMERGENCY)
Age: 36
Discharge: HOME | End: 2021-01-23
Attending: EMERGENCY MEDICINE
Payer: COMMERCIAL

## 2021-01-23 VITALS
SYSTOLIC BLOOD PRESSURE: 122 MMHG | TEMPERATURE: 97.9 F | RESPIRATION RATE: 18 BRPM | BODY MASS INDEX: 25.2 KG/M2 | HEART RATE: 64 BPM | HEIGHT: 71 IN | OXYGEN SATURATION: 99 % | WEIGHT: 180 LBS | DIASTOLIC BLOOD PRESSURE: 80 MMHG

## 2021-01-23 DIAGNOSIS — R51.9 ACUTE NONINTRACTABLE HEADACHE, UNSPECIFIED HEADACHE TYPE: Primary | ICD-10-CM

## 2021-01-23 PROCEDURE — U0002 COVID-19 LAB TEST NON-CDC: HCPCS

## 2021-01-23 PROCEDURE — 6360000002 HC RX W HCPCS: Performed by: EMERGENCY MEDICINE

## 2021-01-23 PROCEDURE — 99284 EMERGENCY DEPT VISIT MOD MDM: CPT

## 2021-01-23 PROCEDURE — 96372 THER/PROPH/DIAG INJ SC/IM: CPT

## 2021-01-23 RX ORDER — BUTALBITAL, ACETAMINOPHEN AND CAFFEINE 50; 325; 40 MG/1; MG/1; MG/1
1 TABLET ORAL EVERY 4 HOURS PRN
Qty: 30 TABLET | Refills: 0 | Status: SHIPPED | OUTPATIENT
Start: 2021-01-23 | End: 2021-02-17

## 2021-01-23 RX ORDER — METOCLOPRAMIDE HYDROCHLORIDE 5 MG/ML
10 INJECTION INTRAMUSCULAR; INTRAVENOUS ONCE
Status: COMPLETED | OUTPATIENT
Start: 2021-01-23 | End: 2021-01-23

## 2021-01-23 RX ORDER — KETOROLAC TROMETHAMINE 30 MG/ML
60 INJECTION, SOLUTION INTRAMUSCULAR; INTRAVENOUS ONCE
Status: COMPLETED | OUTPATIENT
Start: 2021-01-23 | End: 2021-01-23

## 2021-01-23 RX ORDER — ONDANSETRON 4 MG/1
4 TABLET, ORALLY DISINTEGRATING ORAL 3 TIMES DAILY PRN
Qty: 21 TABLET | Refills: 0 | Status: SHIPPED | OUTPATIENT
Start: 2021-01-23 | End: 2021-02-17

## 2021-01-23 RX ORDER — DIPHENHYDRAMINE HYDROCHLORIDE 50 MG/ML
50 INJECTION INTRAMUSCULAR; INTRAVENOUS ONCE
Status: COMPLETED | OUTPATIENT
Start: 2021-01-23 | End: 2021-01-23

## 2021-01-23 RX ADMIN — KETOROLAC TROMETHAMINE 60 MG: 30 INJECTION, SOLUTION INTRAMUSCULAR at 09:03

## 2021-01-23 RX ADMIN — METOCLOPRAMIDE 10 MG: 5 INJECTION, SOLUTION INTRAMUSCULAR; INTRAVENOUS at 09:03

## 2021-01-23 RX ADMIN — DIPHENHYDRAMINE HYDROCHLORIDE 50 MG: 50 INJECTION, SOLUTION INTRAMUSCULAR; INTRAVENOUS at 09:02

## 2021-01-23 ASSESSMENT — PAIN DESCRIPTION - FREQUENCY: FREQUENCY: CONTINUOUS

## 2021-01-23 ASSESSMENT — PAIN SCALES - GENERAL: PAINLEVEL_OUTOF10: 7

## 2021-01-23 ASSESSMENT — PAIN DESCRIPTION - DESCRIPTORS: DESCRIPTORS: ACHING;DISCOMFORT

## 2021-01-23 NOTE — ED PROVIDER NOTES
Triage Chief Complaint:   Headache (c/o head , sore throat and runny nose for 2 days)      [place]:  Gia Hernandez is a 37 y.o. male that presents to the emergency department with a history of a headache that started this morning. Has a history of migraines he states secondary to an arachnoid cyst and a Chiari malformation. Patient has been seen several times in the past per chart review for migraines. He takes Percocet for his headaches and is not on any migraine medications at home. States his headache started this morning in his left temple, aching and throbbing. Made him nauseous. Had an episode of vomiting this morning. Not sudden in onset. Not the worst of his life. No tingling or numbness to his face arms or legs. Feels like his usual migraines. States pain is currently an 8 out of 10. Denies recent trauma. .    Past Medical History:   Diagnosis Date    Arachnoid cyst     Arthritis     [address] mal[address] type I ([facility] [address]     Depression     IBS (irritable bowel syndrome)     Migraines     Seizures ([facility])      Past Surgical History:   Procedure Laterality Date    APPENDECTOMY      NASAL SEPTUM SURGERY       No family history on file.   Social History     Socioeconomic History    Marital status:      Spouse name: Philly    Number of children: 10    Years of education: Not on file    Highest education level: Not on file   Occupational History    Not on file   Social Needs    Financial resource strain: Not on file    Food insecurity     Worry: Not on file     Inability: Not on file   [employer] needs     Medical: Not on file     Non-medical: Not on file   Tobacco Use    Smoking status: Former Smoker     Packs/day: 1.00     Years: 5.00     Pack years: 5.00     Types: Cigarettes     Quit date: 12/10/2020     Years since quittin.1    Smokeless tobacco: Never Used   Substance and Sexual Activity    Alcohol use: Yes     Comment: socially    Drug use: Not Currently Types: Marijuana    Sexual activity: Yes     Partners: Female   Lifestyle    Physical activity     Days per week: Not on file     Minutes per session: Not on file    Stress: Not on file   Relationships    Social connections     Talks on phone: Not on file     Gets together: Not on file     Attends Yarsanism service: Not on file     Active member of club or organization: Not on file     Attends meetings of clubs or organizations: Not on file     Relationship status: Not on file    Intimate partner violence     Fear of current or ex partner: Not on file     Emotionally abused: Not on file     Physically abused: Not on file     Forced sexual activity: Not on file   Other Topics Concern    Not on file   Social History Narrative    Not on file     Current Facility-Administered Medications   Medication Dose Route Frequency Provider Last Rate Last Admin    diphenhydrAMINE (BENADRYL) injection 50 mg  50 mg Intramuscular Once Chinyere Pitt MD        metoclopramide (REGLAN) injection 10 mg  10 mg Intramuscular Once Chinyere Pitt MD        ketorolac (TORADOL) injection 60 mg  60 mg Intramuscular Once Chinyere Pitt MD         Current Outpatient Medications   Medication Sig Dispense Refill    ondansetron (ZOFRAN-ODT) 4 MG disintegrating tablet Take 1 tablet by mouth 3 times daily as needed for Nausea or Vomiting 21 tablet 0    butalbital-acetaminophen-caffeine (FIORICET, ESGIC) -40 MG per tablet Take 1 tablet by mouth every 4 hours as needed for Headaches 30 tablet 0    levETIRAcetam (KEPPRA) 500 MG tablet Take 1 tablet by mouth 2 times daily 60 tablet 3    PARoxetine (PAXIL) 30 MG tablet Take 1 tablet by mouth daily 30 tablet 3    oxyCODONE-acetaminophen (PERCOCET) 5-325 MG per tablet Take 1 tablet by mouth every 4 hours as needed for Pain.       dicyclomine (BENTYL) 20 MG tablet Take 20 mg by mouth 3 times daily as needed        Allergies   Allergen Reactions    Ambien [Zolpidem Tartrate] Other (See Comments)     hallucinations    Wellbutrin [Bupropion] Hives    Codeine Nausea And Vomiting     Nursing Notes Reviewed    ROS:  At least 10 systems reviewed and otherwise negative except as in the [address] Physical Exam:  ED Triage Vitals   Enc Vitals Group      BP       Pulse       Resp       Temp       Temp src       SpO2       Weight       Height       Head Circumference       Peak Flow       Pain Score       Pain Loc       Pain Edu? Excl. in [address]? My pulse oximetry interpretation is which is within the normal range    GENERAL APPEARANCE: Awake and alert. Cooperative. No acute distress. HEAD: Normocephalic. Atraumatic. EYES: EOM's grossly intact. Sclera anicteric. ENT: Mucous membranes are moist. Tolerates saliva. No trismus. NECK: Supple. No meningismus. Trachea midline. HEART: RRR. Radial pulses 2+. LUNGS: Respirations unlabored. CTAB  ABDOMEN: Soft. Non-tender. No guarding or rebound. EXTREMITIES: No acute deformities. SKIN: Warm and dry. NEUROLOGICAL: No gross facial drooping. Moves all 4 extremities spontaneously. Patient is awake, alert, oriented x4. Speaks in full sentences. Normal gait. PSYCHIATRIC: Normal mood. I have reviewed and interpreted all of the currently available lab results from this visit (if applicable):  No results found for this visit on 01/23/21. Radiographs:  [] Radiologist's Wet Read Report Reviewed:     [] Discussed with Radiologist:     [] The following radiograph was interpreted by myself in the absence of a radiologist:     EKG: (All EKG's are interpreted by myself in the absence of a cardiologist)      MDM:  Patient's vital signs are stable. Did receive Benadryl, Reglan, Toradol IM. Will discharge home with Zofran and Fioricet. Follow-up PCP. Continue home pian meds. upon being discharged patient asking for a covid test as he has a runny nose and might have been exposed. Clinical Impression:  1.  Acute nonintractable headache, unspecified headache type        Disposition Vitals:  [unfilled], [unfilled], [unfilled], [unfilled]    Disposition referral (if applicable):  [facility] - [address]  [phone]            Disposition medications (if applicable):  New Prescriptions    BUTALBITAL-ACETAMINOPHEN-CAFFEINE (FIORICET, ESGIC) -40 MG PER TABLET    Take 1 tablet by mouth every 4 hours as needed for Headaches    ONDANSETRON (ZOFRAN-ODT) 4 MG DISINTEGRATING TABLET    Take 1 tablet by mouth 3 times daily as needed for Nausea or Vomiting         (Please note that portions of this note may have been completed with a voice recognition program. Efforts were made to edit the dictations but occasionally words are mis-transcribed.)    MD Bony Yu MD  01/23/21 [address] Mike MD  01/23/21 1283

## 2021-01-24 LAB
SARS-COV-2: NOT DETECTED
SOURCE: NORMAL

## 2021-01-25 ENCOUNTER — CARE COORDINATION (OUTPATIENT)
Dept: CARE COORDINATION | Age: 36
End: 2021-01-25

## 2021-01-25 NOTE — CARE COORDINATION
Patient contacted regarding recent visit for viral symptoms. This Aileen Vu contacted the patient by telephone to perform post discharge call. Verified name and  with patient as identifiers. Provided introduction to self, and reason for call due to viral symptoms of infection and/or exposure to COVID-19. Call within 2 business days of discharge: Yes       Patient presented to emergency department/flu clinic with complaints of viral symptoms/exposure to COVID. Patient reports symptoms are the same. Due to no new or worsening symptoms the RN CTN/ACSHAWNEE was not notified for escalation. Discussed exposure protocols and quarantine with CDC Guidelines What To Do If You Are Sick    Patient was given an opportunity for questions and concerns. Stay home except to get medical care    Separate yourself from other people and animals in your home    Call ahead before visiting your doctor    Wear a facemask    Cover your coughs and sneezes    Clean your hands often    Avoid sharing personal household items    Clean all high-touch surfaces everyday    Monitor your symptoms  Seek prompt medical attention if your illness is worsening (e.g., difficulty breathing). Before seeking care, call your healthcare provider and tell them that you have, or are being evaluated for, COVID-19. Put on a facemask before you enter the facility. These steps will help the healthcare provider's office to keep other people in the office or waiting room from getting infected or exposed. Ask your healthcare provider to call the local or [facility] health department. Persons who are placed under If you have a medical emergency and need to call 911, notify the dispatch personnel that you have, or are being evaluated for COVID-19. If possible, put on a facemask before emergency medical services arrive.     The patient agrees to contact the [facility] exposure line [phone], local health department [place] [facility]: ([phone]) and PCP office for questions related to their healthcare. Author provided contact information for future reference. Patient/family/caregiver given information for [employer] and agrees to enroll no  Patient's preferred e-mail:    Patient's preferred phone number:  Based on Loop alert triggers, patient will be contacted by nurse care manager for worsening symptoms. Spoke with pt regarding ED visit on 1.23.21. Pt reports feeling the same. Pt reports my chart is active and has views COVID results. Pt reports picking up Rx's. Pt reports hasn't followed up with PCP as of yet. Pt declines [id number] and [facility]. Pt declined follow up calls for symptom recheck.     Carol Ann Grier  [phone]

## 2021-02-17 ENCOUNTER — HOSPITAL ENCOUNTER (EMERGENCY)
Age: 36
Discharge: HOME | End: 2021-02-17
Attending: EMERGENCY MEDICINE
Payer: COMMERCIAL

## 2021-02-17 ENCOUNTER — APPOINTMENT (OUTPATIENT)
Dept: CT IMAGING | Age: 36
End: 2021-02-17
Payer: COMMERCIAL

## 2021-02-17 VITALS
RESPIRATION RATE: 16 BRPM | HEIGHT: 71 IN | SYSTOLIC BLOOD PRESSURE: 124 MMHG | TEMPERATURE: 98.1 F | WEIGHT: 185 LBS | DIASTOLIC BLOOD PRESSURE: 76 MMHG | OXYGEN SATURATION: 100 % | BODY MASS INDEX: 25.9 KG/M2 | HEART RATE: 63 BPM

## 2021-02-17 DIAGNOSIS — R10.9 RIGHT FLANK PAIN: ICD-10-CM

## 2021-02-17 DIAGNOSIS — N20.0 KIDNEY STONE: Primary | ICD-10-CM

## 2021-02-17 LAB
BACTERIA: NEGATIVE /HPF
BILIRUBIN URINE: NEGATIVE MG/DL
BLOOD, URINE: ABNORMAL
CAST TYPE: ABNORMAL /HPF
CLARITY: CLEAR
COLOR: YELLOW
CRYSTAL TYPE: NEGATIVE /HPF
EPITHELIAL CELLS, UA: 1 /HPF
GLUCOSE, URINE: NEGATIVE MG/DL
KETONES, URINE: NEGATIVE MG/DL
LEUKOCYTE ESTERASE, URINE: NEGATIVE
NITRITE URINE, QUANTITATIVE: NEGATIVE
PH, URINE: 6 (ref 5–8)
PROTEIN UA: NEGATIVE MG/DL
RBC URINE: 1 /HPF (ref 0–3)
SPECIFIC GRAVITY UA: 1.02 (ref 1–1.03)
UROBILINOGEN, URINE: 0.2 MG/DL (ref 0.2–1)
WBC UA: 1 /HPF (ref 0–2)

## 2021-02-17 PROCEDURE — 99285 EMERGENCY DEPT VISIT HI MDM: CPT

## 2021-02-17 PROCEDURE — 6360000002 HC RX W HCPCS: Performed by: EMERGENCY MEDICINE

## 2021-02-17 PROCEDURE — 74176 CT ABD & PELVIS W/O CONTRAST: CPT

## 2021-02-17 PROCEDURE — 96375 TX/PRO/DX INJ NEW DRUG ADDON: CPT

## 2021-02-17 PROCEDURE — 96374 THER/PROPH/DIAG INJ IV PUSH: CPT

## 2021-02-17 PROCEDURE — 2580000003 HC RX 258: Performed by: EMERGENCY MEDICINE

## 2021-02-17 PROCEDURE — 81001 URINALYSIS AUTO W/SCOPE: CPT

## 2021-02-17 RX ORDER — KETOROLAC TROMETHAMINE 15 MG/ML
15 INJECTION, SOLUTION INTRAMUSCULAR; INTRAVENOUS ONCE
Status: COMPLETED | OUTPATIENT
Start: 2021-02-17 | End: 2021-02-17

## 2021-02-17 RX ORDER — OXYCODONE HYDROCHLORIDE AND ACETAMINOPHEN 5; 325 MG/1; MG/1
1 TABLET ORAL EVERY 6 HOURS PRN
Qty: 12 TABLET | Refills: 0 | Status: SHIPPED | OUTPATIENT
Start: 2021-02-17 | End: 2021-02-20

## 2021-02-17 RX ADMIN — LIDOCAINE HYDROCHLORIDE 125.8 MG: 20 INJECTION, SOLUTION INTRAVENOUS at 15:38

## 2021-02-17 RX ADMIN — KETOROLAC TROMETHAMINE 15 MG: 15 INJECTION, SOLUTION INTRAMUSCULAR; INTRAVENOUS at 15:06

## 2021-02-17 ASSESSMENT — PAIN SCALES - GENERAL
PAINLEVEL_OUTOF10: 3
PAINLEVEL_OUTOF10: 6
PAINLEVEL_OUTOF10: 2

## 2021-02-17 ASSESSMENT — PAIN DESCRIPTION - PAIN TYPE
TYPE: ACUTE PAIN
TYPE: ACUTE PAIN

## 2021-02-17 ASSESSMENT — ENCOUNTER SYMPTOMS
RESPIRATORY NEGATIVE: 1
GASTROINTESTINAL NEGATIVE: 1

## 2021-02-17 ASSESSMENT — PAIN DESCRIPTION - FREQUENCY
FREQUENCY: CONTINUOUS
FREQUENCY: CONTINUOUS

## 2021-02-17 ASSESSMENT — PAIN DESCRIPTION - LOCATION
LOCATION: FLANK
LOCATION: FLANK

## 2021-02-17 ASSESSMENT — PAIN DESCRIPTION - DESCRIPTORS: DESCRIPTORS: SHARP

## 2021-02-17 ASSESSMENT — PAIN DESCRIPTION - ORIENTATION: ORIENTATION: RIGHT

## 2021-02-17 NOTE — ED PROVIDER NOTES
Triage Chief Complaint:   Flank Pain (right. Onset early in morning. Denies urinary symptoms. )    [place]:  Ru Bhardwaj. is a 37 y.o. male that presents patient presented with acute right low flank pain. Came on earlier today has been on and off and colicky no previous history of urolithiasis I am seeing Rick Millan in the past for breakthrough seizures. The patient denies any falls or trauma he is having some urinary urgency and frequency not associate with dysuria no urethral discharge. He has no history of gout or pseudogout or other places that would precipitate the cause for renal stone formation. No history inflammatory bowel disease. Past Medical History:   Diagnosis Date    Arachnoid cyst     Arthritis     Chiari malformation type I ([facility] [address]     Depression     IBS (irritable bowel syndrome)     Migraines     Seizures (HCC)      Past Surgical History:   Procedure Laterality Date    APPENDECTOMY      NASAL SEPTUM SURGERY       History reviewed. No pertinent family history.   Social History     Socioeconomic History    Marital status:      Spouse name: Philly    Number of children: 10    Years of education: Not on file    Highest education level: Not on file   Occupational History    Not on file   Social Needs    Financial resource strain: Not on file    Food insecurity     Worry: Not on file     Inability: Not on file   [employer] needs     Medical: Not on file     Non-medical: Not on file   Tobacco Use    Smoking status: Current Every Day Smoker     Packs/day: 0.50     Years: 5.00     Pack years: 2.50     Types: Cigarettes     Last attempt to quit: 12/10/2020     Years since quittin.1    Smokeless tobacco: Never Used   Substance and Sexual Activity    Alcohol use: Yes     Comment: socially    Drug use: Not Currently     Types: Marijuana    Sexual activity: Yes     Partners: Female   Lifestyle    Physical activity     Days per week: Not on file     Minutes per session: Not on file    Stress: Not on file   Relationships    Social connections     Talks on phone: Not on file     Gets together: Not on file     Attends Judaism service: Not on file     Active member of club or organization: Not on file     Attends meetings of clubs or organizations: Not on file     Relationship status: Not on file    Intimate partner violence     Fear of current or ex partner: Not on file     Emotionally abused: Not on file     Physically abused: Not on file     Forced sexual activity: Not on file   Other Topics Concern    Not on file   Social History Narrative    Not on file     Current Facility-Administered Medications   Medication Dose Route Frequency Provider Last Rate Last Admin    lidocaine (cardiac) (XYLOCAINE) 125.8 mg in sodium chloride 0.9 % 100 mL IVPB  1.5 mg/kg Intravenous Once Zaira Susan, DO         Current Outpatient Medications   Medication Sig Dispense Refill    levETIRAcetam (KEPPRA) 500 MG tablet Take 1 tablet by mouth 2 times daily 60 tablet 3    PARoxetine (PAXIL) 30 MG tablet Take 1 tablet by mouth daily 30 tablet 3    oxyCODONE-acetaminophen (PERCOCET) 5-325 MG per tablet Take 1 tablet by mouth every 4 hours as needed for Pain.  dicyclomine (BENTYL) 20 MG tablet Take 20 mg by mouth 3 times daily as needed        Allergies   Allergen Reactions    Ambien [Zolpidem Tartrate] Other (See Comments)     hallucinations    Wellbutrin [Bupropion] Hives    Codeine Nausea And Vomiting         ROS:    Review of Systems   Constitutional: Negative. HENT: Negative. Respiratory: Negative. Gastrointestinal: Negative. Genitourinary: Positive for flank pain, frequency and urgency. Negative for difficulty urinating, dysuria, scrotal swelling and testicular pain. All other systems reviewed and are negative.       Nursing Notes Reviewed    Physical Exam:  ED Triage Vitals [02/17/21 1432]   Enc Vitals Group      /76      Pulse 63      Resp 16      Temp 98.1 °F (36.7 °C)      Temp Source Oral      SpO2 100 %      Weight 185 lb (83.9 kg)      Height 5' 11\" (1.803 m)      Head Circumference       Peak Flow       Pain Score       Pain Loc       Pain Edu? Excl. in [address]? Physical Exam  Vitals signs and nursing note reviewed. Constitutional:       Appearance: He is well-developed. He is ill-appearing. HENT:      Head: Normocephalic and atraumatic. Right Ear: External ear normal.      Left Ear: External ear normal.      Mouth/Throat:      Mouth: Mucous membranes are moist.   Eyes:      General: No scleral icterus. Right eye: No discharge. Left eye: No discharge. Conjunctiva/sclera: Conjunctivae normal.      Pupils: Pupils are equal, round, and reactive to light. Neck:      Musculoskeletal: Normal range of motion and neck supple. Thyroid: No thyromegaly. Vascular: No JVD. Trachea: No tracheal deviation. Cardiovascular:      Rate and Rhythm: Normal rate and regular rhythm. Heart sounds: Normal heart sounds. No murmur. No friction rub. No gallop. Pulmonary:      Effort: Pulmonary effort is normal. No respiratory distress. Breath sounds: Normal breath sounds. No stridor. No wheezing or rales. Chest:      Chest wall: No tenderness. Abdominal:      General: Bowel sounds are normal. There is no distension. Palpations: Abdomen is soft. There is no mass. Tenderness: There is no abdominal tenderness. There is right CVA tenderness. There is no guarding or rebound. Hernia: No hernia is present. Musculoskeletal: Normal range of motion. General: No tenderness or deformity. Lymphadenopathy:      Cervical: No cervical adenopathy. Skin:     General: Skin is warm and dry. Capillary Refill: Capillary refill takes less than 2 seconds. Coloration: Skin is not pale. Findings: No erythema or rash. Neurological:      General: No focal deficit present.       Mental Status: He is alert and oriented to person, place, and time. Cranial Nerves: No cranial nerve deficit. Sensory: No sensory deficit. Deep Tendon Reflexes: Reflexes are normal and symmetric. Reflexes normal.   Psychiatric:         Speech: Speech normal.         Behavior: Behavior normal.         Thought Content: Thought content normal.         Judgment: Judgment normal.         I have reviewed and interpreted all of the currently available lab results from this visit (ifapplicable):  No results found for this visit on 02/17/21. Radiographs (if obtained):  [] The following radiograph wasinterpreted by myself in the absence of a radiologist:   [] Radiologist's Report Reviewed:  CT ABDOMEN PELVIS WO CONTRAST Additional Contrast? None    (Results Pending)         EKG (if obtained): (All EKG's are interpreted by myself in the absence of a cardiologist)    Chart review shows recent radiographs:  No results found. MDM:      Patient presents to the ED with acute right flank pain symptoms very concerning for renal colic. CT was obtained does reveal a 1 mm stone at the distal ureter await official interpretation but clearly lines up appropriately. Patient will go home with a short course of analgesic meds follow-up with urology a work note was given today. Please note that portions of this note may have been completed with a voice recognition/dictation program. Efforts were made to edit the dictations but occasionally words are mis-transcribed.      All pertinent Lab data and radiographic results reviewed with patient at bedside. The patient and/or the family were informed of the results of any tests/labs/imaging, the treatment plan, and time was allotted to answer questions. See chart for details of medications given during the ED stay.     The likelihood of other entities in the differential is insufficient to justify any further testing for them. This was explained to the patient.  The patient was advised that persistent or worsening symptoms would require further evaluation.                Clinical Impression:  1. Kidney stone    2. Right flank pain      Disposition referral (if applicable):  No follow-up provider specified. Disposition medications (if applicable):  New Prescriptions    No medications on file           Jeremias Carr DO, FACEAB      Comment: Please note this report has been produced using speech recognition software and maycontain errors related to that system including errors in grammar, punctuation, and spelling, as well as words and phrases that may be inappropriate. If there are any questions or concerns please feel free to contact thedictating provider for clarification.         Cecilia Connor,   02/17/21 Zi Jerome DO  02/17/21 4057

## 2021-02-17 NOTE — ED NOTES
Pt discharged with instructions and prescriptions. Discussed when and how to take medications, follow up with urology and pt stated understanding.   Pt walked out of the [id number], RN  02/17/21 [id number]

## 2021-07-19 ENCOUNTER — OFFICE VISIT (OUTPATIENT)
Dept: FAMILY MEDICINE CLINIC | Age: 36
End: 2021-07-19
Payer: COMMERCIAL

## 2021-07-19 VITALS
OXYGEN SATURATION: 98 % | SYSTOLIC BLOOD PRESSURE: 94 MMHG | BODY MASS INDEX: 27.03 KG/M2 | TEMPERATURE: 97.2 F | HEART RATE: 84 BPM | DIASTOLIC BLOOD PRESSURE: 60 MMHG | WEIGHT: 193.8 LBS | RESPIRATION RATE: 16 BRPM

## 2021-07-19 DIAGNOSIS — Z11.59 NEED FOR HEPATITIS C SCREENING TEST: ICD-10-CM

## 2021-07-19 DIAGNOSIS — R53.83 OTHER FATIGUE: Primary | ICD-10-CM

## 2021-07-19 DIAGNOSIS — G43.809 OTHER MIGRAINE WITHOUT STATUS MIGRAINOSUS, NOT INTRACTABLE: ICD-10-CM

## 2021-07-19 DIAGNOSIS — Z13.220 LIPID SCREENING: ICD-10-CM

## 2021-07-19 DIAGNOSIS — Z13.29 THYROID DISORDER SCREEN: ICD-10-CM

## 2021-07-19 DIAGNOSIS — Z11.4 ENCOUNTER FOR SCREENING FOR HIV: ICD-10-CM

## 2021-07-19 DIAGNOSIS — G93.0 ARACHNOID CYST: ICD-10-CM

## 2021-07-19 DIAGNOSIS — Z13.0 SCREENING FOR DEFICIENCY ANEMIA: ICD-10-CM

## 2021-07-19 DIAGNOSIS — D72.829 LEUKOCYTOSIS, UNSPECIFIED TYPE: ICD-10-CM

## 2021-07-19 DIAGNOSIS — F33.1 MODERATE EPISODE OF RECURRENT MAJOR DEPRESSIVE DISORDER (HCC): ICD-10-CM

## 2021-07-19 DIAGNOSIS — R53.83 OTHER FATIGUE: ICD-10-CM

## 2021-07-19 DIAGNOSIS — R56.9 SEIZURES (HCC): ICD-10-CM

## 2021-07-19 LAB
A/G RATIO: 1.8 (ref 1.1–2.2)
ALBUMIN SERPL-MCNC: 4.8 G/DL (ref 3.4–5)
ALP BLD-CCNC: 111 U/L (ref 40–129)
ALT SERPL-CCNC: 23 U/L (ref 10–40)
ANION GAP SERPL CALCULATED.3IONS-SCNC: 15 MMOL/L (ref 3–16)
AST SERPL-CCNC: 22 U/L (ref 15–37)
BILIRUB SERPL-MCNC: 0.3 MG/DL (ref 0–1)
BUN BLDV-MCNC: 13 MG/DL (ref 7–20)
CALCIUM SERPL-MCNC: 9.3 MG/DL (ref 8.3–10.6)
CHLORIDE BLD-SCNC: 100 MMOL/L (ref 99–110)
CHOLESTEROL, TOTAL: 208 MG/DL (ref 0–199)
CO2: 25 MMOL/L (ref 21–32)
CREAT SERPL-MCNC: 0.8 MG/DL (ref 0.9–1.3)
GFR AFRICAN AMERICAN: >60
GFR NON-AFRICAN AMERICAN: >60
GLOBULIN: 2.6 G/DL
GLUCOSE BLD-MCNC: 85 MG/DL (ref 70–99)
HDLC SERPL-MCNC: 54 MG/DL (ref 40–60)
LDL CHOLESTEROL CALCULATED: 122 MG/DL
POTASSIUM SERPL-SCNC: 3.9 MMOL/L (ref 3.5–5.1)
SODIUM BLD-SCNC: 140 MMOL/L (ref 136–145)
TOTAL PROTEIN: 7.4 G/DL (ref 6.4–8.2)
TRIGL SERPL-MCNC: 162 MG/DL (ref 0–150)
TSH REFLEX FT4: 1.56 UIU/ML (ref 0.27–4.2)
VLDLC SERPL CALC-MCNC: 32 MG/DL

## 2021-07-19 PROCEDURE — 99204 OFFICE O/P NEW MOD 45 MIN: CPT | Performed by: PHYSICIAN ASSISTANT

## 2021-07-19 RX ORDER — DESVENLAFAXINE 50 MG/1
50 TABLET, EXTENDED RELEASE ORAL DAILY
Qty: 30 TABLET | Refills: 3 | Status: SHIPPED | OUTPATIENT
Start: 2021-07-19 | End: 2021-08-10

## 2021-07-19 RX ORDER — OXYCODONE HYDROCHLORIDE AND ACETAMINOPHEN 5; 325 MG/1; MG/1
1 TABLET ORAL EVERY 4 HOURS PRN
COMMUNITY
Start: 2021-02-19 | End: 2021-12-13

## 2021-07-19 ASSESSMENT — PATIENT HEALTH QUESTIONNAIRE - PHQ9
9. THOUGHTS THAT YOU WOULD BE BETTER OFF DEAD, OR OF HURTING YOURSELF: 0
SUM OF ALL RESPONSES TO PHQ QUESTIONS 1-9: 17
2. FEELING DOWN, DEPRESSED OR HOPELESS: 1
1. LITTLE INTEREST OR PLEASURE IN DOING THINGS: 2
SUM OF ALL RESPONSES TO PHQ QUESTIONS 1-9: 17
SUM OF ALL RESPONSES TO PHQ QUESTIONS 1-9: 17
SUM OF ALL RESPONSES TO PHQ9 QUESTIONS 1 & 2: 3
5. POOR APPETITE OR OVEREATING: 3
3. TROUBLE FALLING OR STAYING ASLEEP: 3
8. MOVING OR SPEAKING SO SLOWLY THAT OTHER PEOPLE COULD HAVE NOTICED. OR THE OPPOSITE, BEING SO FIGETY OR RESTLESS THAT YOU HAVE BEEN MOVING AROUND A LOT MORE THAN USUAL: 2
4. FEELING TIRED OR HAVING LITTLE ENERGY: 3
7. TROUBLE CONCENTRATING ON THINGS, SUCH AS READING THE NEWSPAPER OR WATCHING TELEVISION: 2
10. IF YOU CHECKED OFF ANY PROBLEMS, HOW DIFFICULT HAVE THESE PROBLEMS MADE IT FOR YOU TO DO YOUR WORK, TAKE CARE OF THINGS AT HOME, OR GET ALONG WITH OTHER PEOPLE: 1
6. FEELING BAD ABOUT YOURSELF - OR THAT YOU ARE A FAILURE OR HAVE LET YOURSELF OR YOUR FAMILY DOWN: 1

## 2021-07-19 ASSESSMENT — ENCOUNTER SYMPTOMS
RHINORRHEA: 0
EYE REDNESS: 0
PHOTOPHOBIA: 0
WHEEZING: 0
BLOOD IN STOOL: 0
COLOR CHANGE: 0
SORE THROAT: 0
EYE PAIN: 0
DIARRHEA: 0
CONSTIPATION: 0
NAUSEA: 0
BACK PAIN: 0
CHEST TIGHTNESS: 0
VOMITING: 0
COUGH: 0
EYE DISCHARGE: 0
SHORTNESS OF BREATH: 0
ABDOMINAL PAIN: 0

## 2021-07-19 ASSESSMENT — COLUMBIA-SUICIDE SEVERITY RATING SCALE - C-SSRS
1. WITHIN THE PAST MONTH, HAVE YOU WISHED YOU WERE DEAD OR WISHED YOU COULD GO TO SLEEP AND NOT WAKE UP?: NO
6. HAVE YOU EVER DONE ANYTHING, STARTED TO DO ANYTHING, OR PREPARED TO DO ANYTHING TO END YOUR LIFE?: NO
2. HAVE YOU ACTUALLY HAD ANY THOUGHTS OF KILLING YOURSELF?: NO

## 2021-07-19 NOTE — PROGRESS NOTES
Ellis Salazar (:  1985) is a 37 y.o. male,New patient, here for evaluation of the following chief complaint(s):    Establish Care (patient is here to establish care ) and Fatigue (having extreme fatigue x3 mos )    This is my first patient encounter with Ellis Winston.; previous PCP was Giovanna Urbano CNP; chart review was completed. SUBJECTIVE/OBJECTIVE:  ARA Salazar is a pleasant 37 y.o. male presenting to clinic today for to establish care with new provider. Patient has concerns change and is no longer able to see previous PCP. Extreme Fatigue -patient reports ongoing extreme fatigue which has worsened over the past 3 months; patient reports that he sleeps 8 hours and is still exhausted; patient does report that he drinks approximately 3-4 full sugar soda pops per day, drinks one 5-hour energy beverage per day, drinks a creatine preworkout daily. Patient does not exercise regularly. Patient was discontinued off of Paxil approximately 6 to 7 months ago due to sexual side effects; and does report somewhat uncontrolled depressive symptoms etc.  Including dysphoric mood, anhedonia, not wanting to get up out of bed in the morning. Migraines -patient reports longstanding history of migraine with aura for which he has taken several medications in the past with mixed results; patient previously on Stadol which he was taken off of due to insurance coverage; previously tried on tramadol, gabapentin, NSAIDs, triptans which have been ineffective. Patient reports that the only medication which has worked for his migraines has been Percocet. Patient previously did see pain management doctor however him several years ago but was discharged from the clinic due to inconsistent urine drug screen. Patient reports that occasionally, untreated migraines can worsen which can cause patient anxiety which can lead to seizure activity.   Currently, patient states he takes tylenol but still experiencing migraine approximately twice weekly. Previous MRI imaging has shown arachnoid cyst, mild Chiari I malformation. MRI brain with and without contrast completed 6/16/2020: 3.4 cm arachnoid cyst seen in the right parietal lobe; Impression   No acute intracranial abnormality visualized.       Normal appearing [id number].         Seizure disorderpatient currently prescribed Keppra 500 mg twice daily. Last seizure was 7 months ago. Patient previously seen by neurologist in [place] who no longer takes patient's insurance; patient is seeking to establish with a local neurologist; patient previously did see Dr. Carmelina Rodriguez who he is not interested in seeing again. Depression  discontinued off Paxil 30 mg daily due to sexual side effects; patient reports he had similar side effects with Seroquel, Abilify, Zoloft; patient states she had an allergic reaction to Wellbutrin. Patient does report he was previously diagnosed with a mixed bipolar disorder; patient starts that he does experience \"really high highs\" and \"really low lows. \"  Allergic to wellbutrin which did help; seroquel, abilify, zoloft.      PDMP Reviewed:  [place] Date ID   Written Drug Qty Days Prescriber Rx # Pharmacy Refill   Daily Dose* Pymt Type      02/19/2021  2   02/19/2021  Oxycodone-Acetaminophen 5-325  40.00  [facility] [place]   [id number]   [place] ([id number])   0  42.86 MME  [place]   [place]   02/17/2021  2   02/17/2021  Oxycodone-Acetaminophen 5-325  12.00  [address] [facility] [place]   [id number]   [place] ([id number])   0  30.00 MME  [place]   11/10/2020  1   11/09/2020  Oxycodone-Acetaminophen 5-325  40.00  [address] [facility] [place]   [id number]   [facility] ([id number])   0  42.86 MME  [place]   [place]   10/01/2020  1   10/01/2020  Oxycodone-Acetaminophen 5-325  40.00  [facility] [place]   [id number]   [facility] ([id number])   0  42.86 MME  [place]   [place]   08/26/2020  1   08/26/2020  Oxycodone-Acetaminophen 5-325  40.00  [facility] [place]   [id number]   [facility] ([id number])   0  42.86 MME  [place]   [place]       Current Outpatient Medications   Medication Sig Dispense Refill    oxyCODONE-acetaminophen (PERCOCET) 5-325 MG per tablet Take 1 tablet by mouth every 4 hours as needed.  desvenlafaxine succinate (PRISTIQ) 50 MG TB24 extended release tablet Take 1 tablet by mouth daily 30 tablet 3    levETIRAcetam (KEPPRA) 500 MG tablet Take 1 tablet by mouth 2 times daily 60 tablet 3    dicyclomine (BENTYL) 20 MG tablet Take 20 mg by mouth 3 times daily as needed       PARoxetine (PAXIL) 30 MG tablet Take 1 tablet by mouth daily (Patient not taking: Reported on 7/19/2021) 30 tablet 3     No current facility-administered medications for this visit. Review of Systems   Constitutional: Positive for fatigue. Negative for appetite change, chills and fever. HENT: Negative for congestion, ear pain, hearing loss, rhinorrhea and sore throat. Eyes: Negative for photophobia, pain, discharge and redness. Respiratory: Negative for cough, chest tightness, shortness of breath and wheezing. Cardiovascular: Negative for chest pain, palpitations and leg swelling. Gastrointestinal: Negative for abdominal pain, blood in stool, constipation, diarrhea, nausea and vomiting. Endocrine: Negative for polyuria. Genitourinary: Negative for difficulty urinating, dysuria, flank pain, frequency, hematuria and urgency. Musculoskeletal: Negative for arthralgias, back pain, gait problem and joint swelling. Skin: Negative for color change and rash. Neurological: Positive for headaches. Negative for dizziness, syncope, weakness and light-headedness. Hematological: Negative for adenopathy. Psychiatric/Behavioral: Positive for dysphoric mood. Negative for agitation, behavioral problems and suicidal ideas. The patient is not nervous/anxious. Physical Exam  Vitals and nursing note reviewed. Constitutional:       General: He is not in acute distress. Appearance: He is not ill-appearing. HENT:      Head: Normocephalic and atraumatic. Right Ear: Tympanic membrane and external ear normal.      Left Ear: Tympanic membrane and external ear normal.      Nose: No congestion or rhinorrhea. Mouth/Throat:      Mouth: Mucous membranes are moist.      Pharynx: No oropharyngeal exudate or posterior oropharyngeal erythema. Neck:      Vascular: No carotid bruit. Cardiovascular:      Rate and Rhythm: Normal rate. Pulses: Normal pulses. Pulmonary:      Effort: Pulmonary effort is normal.   Abdominal:      Palpations: Abdomen is soft. Musculoskeletal:         General: Normal range of motion. Cervical back: Normal range of motion. No rigidity. Skin:     General: Skin is warm and dry. Capillary Refill: Capillary refill takes less than 2 seconds. Neurological:      Mental Status: He is alert and oriented to person, place, and time. Mental status is at baseline. Psychiatric:         Mood and Affect: Mood normal.         ASSESSMENT/PLAN:  1. Other fatigue   -Patient's fatigue is likely multifactorial and related to untreated depression as well as excess caffeine intake and lifestyle. -Recommend patient to gradually wean off of caffeine and to discontinue preworkout creatine supplements.   -We will reinitiate patient on antidepressant therapy; will trial patient on Pristiq. Plan follow-up in 1 month, if no relief in 1 month, can consider referral to psychiatry. -     CBC Auto Differential; Future  -     Comprehensive Metabolic Panel; Future  -     TSH WITH REFLEX TO FT4; Future  -     Vitamin D 25 Hydroxy; Future  2. Moderate episode of recurrent major depressive disorder (HCC)   -As above, trial patient on Pristiq; discussed side effects, monitoring, risk benefits etc.  Follow-up in 1 month.   -Increase physical activity; healthy food intake; spent time outdoors etc.  -     CBC Auto Differential; Future  -     TSH WITH REFLEX TO FT4; Future  -     Vitamin D 25 Hydroxy;  Future  -     desvenlafaxine succinate (PRISTIQ) 50 MG TB24 extended release tablet; Take 1 tablet by mouth daily, Disp-30 tablet, R-3Normal  3. Other migraine without status migrainosus, not intractable   -Will refer patient to pain management and neurology. Patient reports treatment failure of various migraine treatment modalities besides Percocet. -Advised patient to reach out if unable to be seen by neurology or is needing another referral.  -     CBC Auto Differential; Future  -     Comprehensive Metabolic Panel; Future  -     TSH WITH REFLEX TO FT4; Future  -     Vitamin D 25 Hydroxy; Future  -     LIPID PANEL; Future  -     [id number], MD, Pain Management, [place]  4. Seizures ([facility] [address]   -Continue Keppra; patient denies any seizure in the past 7 months; patient to establish with a new neurologist.  Grace Duong patient to reach out if unable to be seen by neurology or is needing another referral.  -     [facility] - Jenny Eugene DO, Neurology, [place]  5. Arachnoid cyst  -     [facility] - Jenny Eugene DO, Neurology, [place]  6. Screening for deficiency anemia  -     CBC Auto Differential; Future  7. Thyroid disorder screen  -     TSH WITH REFLEX TO FT4; Future  8. Lipid screening  -     LIPID PANEL; Future  9. Need for hepatitis C screening test  -     HEPATITIS C ANTIBODY; Future  10. Encounter for screening for HIV  -     HIV-1 AND HIV-2 ANTIBODIES; Future      Return in about 4 weeks (around 8/16/2021), or if symptoms worsen or fail to improve, for Follow Up. An electronic signature was used to authenticate this note.     --GUERLINE Palmer

## 2021-07-20 LAB
BASOPHILS ABSOLUTE: 0.1 K/UL (ref 0–0.2)
BASOPHILS RELATIVE PERCENT: 0.6 %
EOSINOPHILS ABSOLUTE: 0.2 K/UL (ref 0–0.6)
EOSINOPHILS RELATIVE PERCENT: 1 %
HCT VFR BLD CALC: 45.9 % (ref 40.5–52.5)
HEMOGLOBIN: 15.3 G/DL (ref 13.5–17.5)
HEPATITIS C ANTIBODY INTERPRETATION: NORMAL
HIV AG/AB: NORMAL
HIV ANTIGEN: NORMAL
HIV-1 ANTIBODY: NORMAL
HIV-2 AB: NORMAL
LYMPHOCYTES ABSOLUTE: 2.8 K/UL (ref 1–5.1)
LYMPHOCYTES RELATIVE PERCENT: 17.1 %
MCH RBC QN AUTO: 29.9 PG (ref 26–34)
MCHC RBC AUTO-ENTMCNC: 33.3 G/DL (ref 31–36)
MCV RBC AUTO: 89.6 FL (ref 80–100)
MONOCYTES ABSOLUTE: 1.1 K/UL (ref 0–1.3)
MONOCYTES RELATIVE PERCENT: 6.9 %
NEUTROPHILS ABSOLUTE: 12.4 K/UL (ref 1.7–7.7)
NEUTROPHILS RELATIVE PERCENT: 74.4 %
PDW BLD-RTO: 13.3 % (ref 12.4–15.4)
PLATELET # BLD: 259 K/UL (ref 135–450)
PMV BLD AUTO: 10.5 FL (ref 5–10.5)
RBC # BLD: 5.12 M/UL (ref 4.2–5.9)
VITAMIN D 25-HYDROXY: 26.5 NG/ML
WBC # BLD: 16.6 K/UL (ref 4–11)

## 2021-07-30 DIAGNOSIS — D72.829 LEUKOCYTOSIS, UNSPECIFIED TYPE: ICD-10-CM

## 2021-07-30 LAB
BASOPHILS ABSOLUTE: 0.1 K/UL (ref 0–0.2)
BASOPHILS RELATIVE PERCENT: 0.6 %
EOSINOPHILS ABSOLUTE: 0.2 K/UL (ref 0–0.6)
EOSINOPHILS RELATIVE PERCENT: 2.1 %
HCT VFR BLD CALC: 43 % (ref 40.5–52.5)
HEMOGLOBIN: 14.7 G/DL (ref 13.5–17.5)
LYMPHOCYTES ABSOLUTE: 2.8 K/UL (ref 1–5.1)
LYMPHOCYTES RELATIVE PERCENT: 26.3 %
MCH RBC QN AUTO: 30 PG (ref 26–34)
MCHC RBC AUTO-ENTMCNC: 34.1 G/DL (ref 31–36)
MCV RBC AUTO: 88 FL (ref 80–100)
MONOCYTES ABSOLUTE: 0.7 K/UL (ref 0–1.3)
MONOCYTES RELATIVE PERCENT: 7 %
NEUTROPHILS ABSOLUTE: 6.8 K/UL (ref 1.7–7.7)
NEUTROPHILS RELATIVE PERCENT: 64 %
PDW BLD-RTO: 13 % (ref 12.4–15.4)
PLATELET # BLD: 258 K/UL (ref 135–450)
PMV BLD AUTO: 9.9 FL (ref 5–10.5)
RBC # BLD: 4.89 M/UL (ref 4.2–5.9)
WBC # BLD: 10.6 K/UL (ref 4–11)

## 2021-08-06 DIAGNOSIS — G43.809 OTHER MIGRAINE WITHOUT STATUS MIGRAINOSUS, NOT INTRACTABLE: Primary | ICD-10-CM

## 2021-08-18 ENCOUNTER — OFFICE VISIT (OUTPATIENT)
Dept: FAMILY MEDICINE CLINIC | Age: 36
End: 2021-08-18
Payer: COMMERCIAL

## 2021-08-18 VITALS
OXYGEN SATURATION: 98 % | TEMPERATURE: 97.3 F | SYSTOLIC BLOOD PRESSURE: 124 MMHG | BODY MASS INDEX: 28.31 KG/M2 | RESPIRATION RATE: 16 BRPM | DIASTOLIC BLOOD PRESSURE: 84 MMHG | WEIGHT: 203 LBS | HEART RATE: 86 BPM

## 2021-08-18 DIAGNOSIS — R53.83 OTHER FATIGUE: Primary | ICD-10-CM

## 2021-08-18 DIAGNOSIS — D72.829 LEUKOCYTOSIS, UNSPECIFIED TYPE: ICD-10-CM

## 2021-08-18 DIAGNOSIS — R56.9 SEIZURES (HCC): ICD-10-CM

## 2021-08-18 DIAGNOSIS — G43.809 OTHER MIGRAINE WITHOUT STATUS MIGRAINOSUS, NOT INTRACTABLE: ICD-10-CM

## 2021-08-18 DIAGNOSIS — G93.0 ARACHNOID CYST: ICD-10-CM

## 2021-08-18 DIAGNOSIS — F33.1 MODERATE EPISODE OF RECURRENT MAJOR DEPRESSIVE DISORDER (HCC): ICD-10-CM

## 2021-08-18 PROCEDURE — 99214 OFFICE O/P EST MOD 30 MIN: CPT | Performed by: PHYSICIAN ASSISTANT

## 2021-08-18 RX ORDER — DESVENLAFAXINE 50 MG/1
100 TABLET, EXTENDED RELEASE ORAL DAILY
Qty: 90 TABLET | Refills: 1 | Status: SHIPPED | OUTPATIENT
Start: 2021-08-18 | End: 2022-05-13 | Stop reason: SDUPTHER

## 2021-08-18 RX ORDER — DESVENLAFAXINE 100 MG/1
100 TABLET, EXTENDED RELEASE ORAL DAILY
Qty: 90 TABLET | Refills: 1 | Status: SHIPPED | OUTPATIENT
Start: 2021-08-18 | End: 2021-08-18 | Stop reason: SDUPTHER

## 2021-08-18 ASSESSMENT — ENCOUNTER SYMPTOMS
DIARRHEA: 0
EYE PAIN: 0
BLOOD IN STOOL: 0
EYE REDNESS: 0
CHEST TIGHTNESS: 0
NAUSEA: 0
COLOR CHANGE: 0
EYE DISCHARGE: 0
COUGH: 0
ABDOMINAL PAIN: 0
SHORTNESS OF BREATH: 0
WHEEZING: 0
RHINORRHEA: 0
SORE THROAT: 0
CONSTIPATION: 0
VOMITING: 0
BACK PAIN: 0
PHOTOPHOBIA: 0

## 2021-08-18 ASSESSMENT — PATIENT HEALTH QUESTIONNAIRE - PHQ9
2. FEELING DOWN, DEPRESSED OR HOPELESS: 0
SUM OF ALL RESPONSES TO PHQ QUESTIONS 1-9: 0
SUM OF ALL RESPONSES TO PHQ QUESTIONS 1-9: 0
1. LITTLE INTEREST OR PLEASURE IN DOING THINGS: 0
SUM OF ALL RESPONSES TO PHQ9 QUESTIONS 1 & 2: 0
SUM OF ALL RESPONSES TO PHQ QUESTIONS 1-9: 0

## 2021-08-18 NOTE — PROGRESS NOTES
Arya Watts (:  1985) is a 37 y.o. male,Established patient, here for evaluation of the following chief complaint(s):    Follow-up (patient is here for a follow up)      SUBJECTIVE/OBJECTIVE:  HPI  Arya Watts is a pleasant 37 y.o. male presenting to clinic today for 1 month follow-up depression/fatigue. Fatigue - pt reports mild improvement in fatigue since last visit; patient reports that he has cut back significantly on caffeine intake and has been drinking energy drinks as he previously was; patient states he still does drink a few sodas during the day. Patient reports first week of cutting back on caffeine was very difficult but feels better now. Thyroid and blood counts normal.  Vitamin D level 26.5 ng/mL; recommended patient to initiate over-the-counter vitamin D supplementation. Patient reports he has been taking daily multivitamin which contains 600 units of vitamin D daily. Leukocytosis -resolved. Headaches - patient referred to pain management at last visit. Patient was unable to get in with them due to scheduling constraints; new referral placed; patient has not heard from new referral.  Patient did not hear from neurology referral.    Seizure Disorder - well controlled on Keppra; no issues or seizures to report. Depression -patient initiated on trial of Pristiq at last visit; patient previously reported side effects and ineffectiveness of various other antidepressant medications. Patient reports that Pristiq does seem to be providing benefit and patient denies side effects. Vitamin D def - started MVI with 600 units daily;     IBS-D -well controlled on Bentyl as prescribed by previous PCP. Patient reports she has been off the is and does not need refills; patient reports he takes typically 1 pill/day. Tobacco Use - down to 1/3 PPD from 1 ppd; switching to vaping.   Patient reports that his work will pay for all smoking cessation therapies if he so desires. Current Outpatient Medications   Medication Sig Dispense Refill    desvenlafaxine succinate (PRISTIQ) 100 MG TB24 extended release tablet Take 1 tablet by mouth daily 90 tablet 1    levETIRAcetam (KEPPRA) 500 MG tablet Take 1 tablet by mouth 2 times daily 60 tablet 3    dicyclomine (BENTYL) 20 MG tablet Take 20 mg by mouth 3 times daily as needed       oxyCODONE-acetaminophen (PERCOCET) 5-325 MG per tablet Take 1 tablet by mouth every 4 hours as needed. (Patient not taking: Reported on 8/18/2021)       No current facility-administered medications for this visit. Review of Systems   Constitutional: Positive for fatigue. Negative for appetite change, chills and fever. HENT: Negative for congestion, ear pain, hearing loss, rhinorrhea and sore throat. Eyes: Negative for photophobia, pain, discharge and redness. Respiratory: Negative for cough, chest tightness, shortness of breath and wheezing. Cardiovascular: Negative for chest pain, palpitations and leg swelling. Gastrointestinal: Negative for abdominal pain, blood in stool, constipation, diarrhea, nausea and vomiting. Endocrine: Negative for polyuria. Genitourinary: Negative for difficulty urinating, dysuria, flank pain, frequency, hematuria and urgency. Musculoskeletal: Negative for arthralgias, back pain, gait problem and joint swelling. Skin: Negative for color change and rash. Neurological: Positive for headaches. Negative for dizziness, syncope, weakness and light-headedness. Hematological: Negative for adenopathy. Psychiatric/Behavioral: Positive for dysphoric mood. Negative for agitation, behavioral problems and suicidal ideas. The patient is not nervous/anxious. Physical Exam  Vitals and nursing note reviewed. Constitutional:       General: He is not in acute distress. Appearance: He is not ill-appearing. HENT:      Head: Normocephalic and atraumatic.       Right Ear: Tympanic membrane and external ear normal.      Left Ear: Tympanic membrane and external ear normal.      Nose: No congestion or rhinorrhea. Mouth/Throat:      Mouth: Mucous membranes are moist.      Pharynx: No oropharyngeal exudate or posterior oropharyngeal erythema. Neck:      Vascular: No carotid bruit. Cardiovascular:      Rate and Rhythm: Normal rate. Pulses: Normal pulses. Pulmonary:      Effort: Pulmonary effort is normal.   Abdominal:      Palpations: Abdomen is soft. Musculoskeletal:         General: Normal range of motion. Cervical back: Normal range of motion. No rigidity. Skin:     General: Skin is warm and dry. Capillary Refill: Capillary refill takes less than 2 seconds. Neurological:      Mental Status: He is alert and oriented to person, place, and time. Mental status is at baseline. Psychiatric:         Mood and Affect: Mood normal.         ASSESSMENT/PLAN:  1. Other fatigue   -Improved from last visit with decreasing caffeine intake and initiation of antidepressant; continue with lifestyle modifications and continue with a Pristiq. 2. Moderate episode of recurrent major depressive disorder ([facility] [facility]   -Improved; continue Pristiq at 50 mg daily; advised patient that if he desires he can begin taking 2 of these as he has tolerated well so far. Patient not interested in referral to specialist.  -     desvenlafaxine succinate (PRISTIQ) 50 MG TB24 extended release tablet; Take 1 tablet by mouth daily, Disp-90 tablet, R-1Normal  3. Other migraine without status migrainosus, not intractable   -Patient reports he continues with somewhat frequent migraines; patient is not interested in prescriptions for triptan or Fioricet.   -New referral is placed for pain management and neurology; advised patient to discuss with his insurance [employer] Jordon Almeida DO, Neurology, [place]  -     Amb External Referral To Pain Clinic  4. Seizures ([facility] [address]   -New referral placed for neurology. Continue Keppra. -     AFL - Alina Bray DO, Neurology, [place]  5. Leukocytosis, unspecified type   -Resolved. 6. Arachnoid cyst  -     CORIE - Alina Bray DO, Neurology, [place]      Return in about 3 months (around 11/18/2021), or if symptoms worsen or fail to improve, for Follow Up. An electronic signature was used to authenticate this note.     --GUERLINE Venegas

## 2021-08-24 ENCOUNTER — TELEPHONE (OUTPATIENT)
Dept: FAMILY MEDICINE CLINIC | Age: 36
End: 2021-08-24

## 2021-08-24 DIAGNOSIS — G89.29 OTHER CHRONIC PAIN: Primary | ICD-10-CM

## 2021-08-24 NOTE — TELEPHONE ENCOUNTER
New referral ordered for [facility] pain management in [facility]; will fax referral form.

## 2021-12-13 ENCOUNTER — HOSPITAL ENCOUNTER (EMERGENCY)
Age: 36
Discharge: HOME | End: 2021-12-13
Attending: EMERGENCY MEDICINE
Payer: COMMERCIAL

## 2021-12-13 VITALS
SYSTOLIC BLOOD PRESSURE: 117 MMHG | OXYGEN SATURATION: 94 % | HEART RATE: 55 BPM | DIASTOLIC BLOOD PRESSURE: 77 MMHG | TEMPERATURE: 98 F | BODY MASS INDEX: 26.78 KG/M2 | WEIGHT: 192 LBS | RESPIRATION RATE: 16 BRPM

## 2021-12-13 DIAGNOSIS — S61.012A LACERATION OF LEFT THUMB WITHOUT FOREIGN BODY WITHOUT DAMAGE TO NAIL, INITIAL ENCOUNTER: Primary | ICD-10-CM

## 2021-12-13 PROCEDURE — 90715 TDAP VACCINE 7 YRS/> IM: CPT | Performed by: EMERGENCY MEDICINE

## 2021-12-13 PROCEDURE — 12001 RPR S/N/AX/GEN/TRNK 2.5CM/<: CPT

## 2021-12-13 PROCEDURE — 6360000002 HC RX W HCPCS: Performed by: EMERGENCY MEDICINE

## 2021-12-13 PROCEDURE — 90471 IMMUNIZATION ADMIN: CPT | Performed by: EMERGENCY MEDICINE

## 2021-12-13 PROCEDURE — 6370000000 HC RX 637 (ALT 250 FOR IP): Performed by: EMERGENCY MEDICINE

## 2021-12-13 PROCEDURE — 99283 EMERGENCY DEPT VISIT LOW MDM: CPT

## 2021-12-13 RX ORDER — GINSENG 100 MG
CAPSULE ORAL
Qty: 14 G | Refills: 0 | Status: SHIPPED | OUTPATIENT
Start: 2021-12-13 | End: 2021-12-23

## 2021-12-13 RX ORDER — CEPHALEXIN 500 MG/1
500 CAPSULE ORAL 2 TIMES DAILY
Qty: 20 CAPSULE | Refills: 0 | Status: SHIPPED | OUTPATIENT
Start: 2021-12-13 | End: 2021-12-23

## 2021-12-13 RX ORDER — DIAPER,BRIEF,INFANT-TODD,DISP
EACH MISCELLANEOUS ONCE
Status: COMPLETED | OUTPATIENT
Start: 2021-12-13 | End: 2021-12-13

## 2021-12-13 RX ADMIN — TETANUS TOXOID, REDUCED DIPHTHERIA TOXOID AND ACELLULAR PERTUSSIS VACCINE, ADSORBED 0.5 ML: 5; 2.5; 8; 8; 2.5 SUSPENSION INTRAMUSCULAR at 20:46

## 2021-12-13 RX ADMIN — BACITRACIN ZINC: 500 OINTMENT TOPICAL at 21:00

## 2021-12-13 ASSESSMENT — PAIN DESCRIPTION - PAIN TYPE: TYPE: ACUTE PAIN

## 2021-12-13 ASSESSMENT — PAIN SCALES - GENERAL: PAINLEVEL_OUTOF10: 6

## 2021-12-14 NOTE — ED PROVIDER NOTES
EMERGENCY DEPARTMENT ENCOUNTER      CHIEF COMPLAINT:   Left thumb laceration    HPI: Luna Stroud is a 31 y.o. male who presents to the Emergency Department complaining of a laceration to his left thumb. The patient states he tripped over a toy at home and fell earlier this morning and hit his left hand on an unknown object. He noticed an immediate laceration to his left thumb that was initially bleeding but is now controlled. He states it is painful. It feels achy and sharp at times. There are no exacerbating or alleviating factors. The patient is right-hand dominant. Last tetanus is unknown. He denies hitting his head, loss of consciousness or any other injuries. The patient denies fevers, chills, chest pain, shortness of breath, abdominal pain, numbness, tingling, weakness, or any other complaints. REVIEW OF SYSTEMS:  CONSTITUTIONAL:  Denies fever, chills, weight loss or weakness  EYES:  Denies photophobia or discharge  ENT:  Denies sore throat or ear pain  CARDIOVASCULAR:  Denies chest pain, palpitations or swelling  RESPIRATORY:  Denies cough or shortness of breath  GI: Denies abdominal pain, nausea, vomiting, or diarrhea  MUSCULOSKELETAL:  Denies back pain  SKIN: See HPI  NEUROLOGIC:  Denies headache, focal weakness or sensory changes  All systems negative except as marked. \"Remaining review of systems reviewed and negative. I have reviewed the nursing triage documentation and agree unless otherwise noted below. \"    PAST MEDICAL HISTORY:   Past Medical History:   Diagnosis Date    Arachnoid cyst     Arthritis     Chiari malformation type I ([facility] [address]     Depression     IBS (irritable bowel syndrome)     Migraines     Seizures ([facility])        CURRENT MEDICATIONS:   Home medications reviewed. SURGICAL HISTORY:   Past Surgical History:   Procedure Laterality Date    APPENDECTOMY      NASAL SEPTUM SURGERY         FAMILY HISTORY:   No family history on file.     SOCIAL HISTORY:   Social History     Socioeconomic History    Marital status:      Spouse name: Philly    Number of children: 10    Years of education: Not on file    Highest education level: Not on file   Occupational History    Not on file   Tobacco Use    Smoking status: Current Every Day Smoker     Packs/day: 0.50     Years: 5.00     Pack years: 2.50     Types: Cigarettes     Last attempt to quit: 12/10/2020     Years since quittin.0    Smokeless tobacco: Never Used   Vaping Use    Vaping Use: Never used   Substance and Sexual Activity    Alcohol use: Yes     Comment: socially    Drug use: Not Currently     Types: Marijuana Darryle Pimple)    Sexual activity: Yes     Partners: Female   Other Topics Concern    Not on file   Social History Narrative    Not on file     Social Determinants of Health     Financial Resource Strain:     Difficulty of Paying Living Expenses: Not on file   Food Insecurity:     Worried About Running Out of Food in the Last Year: Not on file    Cecil of Food in the Last Year: Not on file   Transportation Needs:     Lack of Transportation (Medical): Not on file    Lack of Transportation (Non-Medical):  Not on file   Physical Activity:     Days of Exercise per Week: Not on file    Minutes of Exercise per Session: Not on file   Stress:     Feeling of Stress : Not on file   Social Connections:     Frequency of Communication with Friends and Family: Not on file    Frequency of Social Gatherings with Friends and Family: Not on file    Attends Pent[employer]al Services: Not on file    Active Member of Clubs or Organizations: Not on file    Attends Club or Organization Meetings: Not on file    Marital Status: Not on file   Intimate Partner Violence:     Fear of Current or Ex-Partner: Not on file    Emotionally Abused: Not on file    Physically Abused: Not on file    Sexually Abused: Not on file   Housing Stability:     Unable to Pay for Housing in the Last Year: Not on file    Number of Places Lived in the Last Year: Not on file    Unstable Housing in the Last Year: Not on file       ALLERGIES: Ambien [zolpidem tartrate], Wellbutrin [bupropion], and Codeine    PHYSICAL EXAM:  VITAL SIGNS:   ED Triage Vitals   Enc Vitals Group      BP       Pulse       Resp       Temp       Temp src       SpO2       Weight       Height       Head Circumference       Peak Flow       Pain Score       Pain Loc       Pain Edu? Excl. in [address]? Constitutional:  Non-toxic appearance  HENT: Normocephalic, Atraumatic  Eyes: PERRL, conjunctiva normal   Neck: Normal range of motion, No tenderness, Supple, No stridor, No lymphadenopathy  Cardiovascular:  Normal heart rate, Normal rhythm  Pulmonary/Chest:  Normal breath sounds, No respiratory distress, No wheezing  Abdomen: Bowel sounds normal, Soft, No tenderness, No masses, No pulsatile masses  Extremities: There is a 2 cm horizontal laceration noted to the dorsal surface of the left overlying the interphalangeal joint, no evidence of open fracture, the peripheral pulses are strong, Capillary refill is brisk, there is normal motor and sensory function, the hand is pink, warm, and well perfused, there is no cyanosis  Skin:  Warm, Dry, No erythema, see extremity exam      EKG:    None    Radiology / Procedures:  Laceration Repair Procedure Note  Indication: Laceration    Procedure: The patient was placed in the appropriate position and anesthesia around the laceration was obtained with a full digital block of the left thumb using 1% Lidocaine without epinephrine. The area was then cleansed with Shur-Clens and draped in a sterile fashion. The laceration was closed with 4-0 Ethilon using interrupted sutures. There were no additional lacerations requiring repair. The wound area was then dressed with bacitracin and a sterile dressing. Total repaired wound length: 2 cm. Other Items: Suture count: 3  The patient tolerated the procedure well.   Complications: None      ED COURSE & MEDICAL DECISION MAKING:  Pertinent Labs & Imaging studies reviewed. (See chart for details)  On exam, the patient is afebrile and nontoxic appearing. He is hemodynamically stable and neurologically intact. There is a laceration to the left thumb as above. This was repaired without difficulty. Please see procedure note above. Tetanus was updated. Bacitracin and sterile dressing were applied to the thumb. He was placed in an aluminum form splint to help immobilize the joint to facilitate healing. I suspect that the patient had a mechanical fall and sustained a left thumb laceration. I have a low suspicion for open fracture, acute fracture, dislocation, compartment syndrome, necrotizing fasciitis, or neurological injury. I feel that the patient is stable for outpatient management with follow up in 2-3 days. Sutures are to be removed in 7 to 10 days. He is given return precautions. He will be prescribed bacitracin and prophylactic Keflex. The patient verbalized understanding, was agreeable with plan, and the patient was discharged home in stable condition. Clinical Impression:  1. Laceration of left thumb without foreign body without damage to nail, initial encounter        Disposition referral (if applicable):  Madalyn Campos  [address] [place] [address]  [phone]    Schedule an appointment as soon as possible for a visit in 3 days  For wound re-check    Madalyn Campos  [address]  [phone]    Schedule an appointment as soon as possible for a visit in 8 days  For suture removal    [facility] Emergency Department  [address] [place] [address] [phone]  Go to   If symptoms worsen      Disposition medications (if applicable):  Discharge Medication List as of 12/13/2021  9:26 PM      START taking these medications    Details   bacitracin 500 UNIT/GM ointment Apply topically 2 times daily. , Disp-14 g, R-0, Normal cephALEXin (KEFLEX) 500 MG capsule Take 1 capsule by mouth 2 times daily for 10 days, Disp-20 capsule, R-0Normal               Comment: Please note this report has been produced using speech recognition software and may contain errors related to that system including errors in grammar, punctuation, and spelling, as well as words and phrases that may be inappropriate. If there are any questions or concerns please feel free to contact the dictating provider for clarification.         Essie Kwong MD  12/14/21 [id number]

## 2021-12-17 ENCOUNTER — TELEPHONE (OUTPATIENT)
Dept: FAMILY MEDICINE CLINIC | Age: 36
End: 2021-12-17

## 2021-12-17 DIAGNOSIS — R10.9 ABDOMINAL DISCOMFORT: Primary | ICD-10-CM

## 2021-12-17 RX ORDER — DICYCLOMINE HCL 20 MG
20 TABLET ORAL 3 TIMES DAILY PRN
Qty: 120 TABLET | Refills: 0 | Status: SHIPPED | OUTPATIENT
Start: 2021-12-17 | End: 2022-03-04 | Stop reason: SDUPTHER

## 2022-01-14 ENCOUNTER — HOSPITAL ENCOUNTER (EMERGENCY)
Age: 37
Discharge: HOME | End: 2022-01-14
Attending: EMERGENCY MEDICINE
Payer: COMMERCIAL

## 2022-01-14 VITALS
RESPIRATION RATE: 20 BRPM | WEIGHT: 185 LBS | TEMPERATURE: 99.3 F | BODY MASS INDEX: 25.8 KG/M2 | DIASTOLIC BLOOD PRESSURE: 53 MMHG | HEART RATE: 80 BPM | OXYGEN SATURATION: 95 % | SYSTOLIC BLOOD PRESSURE: 112 MMHG

## 2022-01-14 DIAGNOSIS — R51.9 ACUTE NONINTRACTABLE HEADACHE, UNSPECIFIED HEADACHE TYPE: Primary | ICD-10-CM

## 2022-01-14 PROCEDURE — U0005 INFEC AGEN DETEC AMPLI PROBE: HCPCS

## 2022-01-14 PROCEDURE — 99284 EMERGENCY DEPT VISIT MOD MDM: CPT

## 2022-01-14 PROCEDURE — 6360000002 HC RX W HCPCS: Performed by: EMERGENCY MEDICINE

## 2022-01-14 PROCEDURE — U0003 INFECTIOUS AGENT DETECTION BY NUCLEIC ACID (DNA OR RNA); SEVERE ACUTE RESPIRATORY SYNDROME CORONAVIRUS 2 (SARS-COV-2) (CORONAVIRUS DISEASE [COVID-19]), AMPLIFIED PROBE TECHNIQUE, MAKING USE OF HIGH THROUGHPUT TECHNOLOGIES AS DESCRIBED BY CMS-2020-01-R: HCPCS

## 2022-01-14 PROCEDURE — 96374 THER/PROPH/DIAG INJ IV PUSH: CPT

## 2022-01-14 PROCEDURE — 2580000003 HC RX 258: Performed by: EMERGENCY MEDICINE

## 2022-01-14 PROCEDURE — 96375 TX/PRO/DX INJ NEW DRUG ADDON: CPT

## 2022-01-14 RX ORDER — DIPHENHYDRAMINE HYDROCHLORIDE 50 MG/ML
25 INJECTION INTRAMUSCULAR; INTRAVENOUS ONCE
Status: COMPLETED | OUTPATIENT
Start: 2022-01-14 | End: 2022-01-14

## 2022-01-14 RX ORDER — 0.9 % SODIUM CHLORIDE 0.9 %
1000 INTRAVENOUS SOLUTION INTRAVENOUS ONCE
Status: COMPLETED | OUTPATIENT
Start: 2022-01-14 | End: 2022-01-14

## 2022-01-14 RX ORDER — METOCLOPRAMIDE HYDROCHLORIDE 5 MG/ML
10 INJECTION INTRAMUSCULAR; INTRAVENOUS ONCE
Status: COMPLETED | OUTPATIENT
Start: 2022-01-14 | End: 2022-01-14

## 2022-01-14 RX ORDER — KETOROLAC TROMETHAMINE 30 MG/ML
30 INJECTION, SOLUTION INTRAMUSCULAR; INTRAVENOUS ONCE
Status: COMPLETED | OUTPATIENT
Start: 2022-01-14 | End: 2022-01-14

## 2022-01-14 RX ADMIN — DIPHENHYDRAMINE HYDROCHLORIDE 25 MG: 50 INJECTION, SOLUTION INTRAMUSCULAR; INTRAVENOUS at 14:54

## 2022-01-14 RX ADMIN — SODIUM CHLORIDE 1000 ML: 9 INJECTION, SOLUTION INTRAVENOUS at 14:53

## 2022-01-14 RX ADMIN — METOCLOPRAMIDE HYDROCHLORIDE 10 MG: 5 INJECTION INTRAMUSCULAR; INTRAVENOUS at 14:55

## 2022-01-14 RX ADMIN — KETOROLAC TROMETHAMINE 30 MG: 30 INJECTION, SOLUTION INTRAMUSCULAR at 14:55

## 2022-01-14 ASSESSMENT — PAIN DESCRIPTION - PAIN TYPE
TYPE: ACUTE PAIN
TYPE: ACUTE PAIN

## 2022-01-14 ASSESSMENT — PAIN DESCRIPTION - ONSET: ONSET: ON-GOING

## 2022-01-14 ASSESSMENT — PAIN DESCRIPTION - FREQUENCY: FREQUENCY: CONTINUOUS

## 2022-01-14 ASSESSMENT — PAIN DESCRIPTION - LOCATION
LOCATION: HEAD
LOCATION: HEAD

## 2022-01-14 ASSESSMENT — PAIN SCALES - GENERAL
PAINLEVEL_OUTOF10: 4
PAINLEVEL_OUTOF10: 9

## 2022-01-14 ASSESSMENT — PAIN DESCRIPTION - DESCRIPTORS: DESCRIPTORS: RADIATING;SQUEEZING

## 2022-01-14 NOTE — ED PROVIDER NOTES
.  Triage Chief Complaint:   Migraine (Migraine started last night and got worse today. )    [place]:  Fred Orona. is a 31 y.o. male that presents with headache. States migraine headache which started yesterday has been gradually worsening. He states is similar to typical migraine headaches for him. States it is right-sided, achy, pressure-like, constant. States gradually worsening since onset. Denies any trauma. Not worst headache of life, not maximal intensity at onset. Reports associated nausea. No vomiting. He denies fever or chills. States he has history of similar headaches in the past.  No new weakness, numbness or tingling extremities. No new vision change. ROS:  General:  No fevers, no chills, no weakness  Eyes:  No recent vison changes, no discharge  ENT:  No sore throat, no nasal congestion, no hearing changes  Cardiovascular:  No chest pain  Respiratory:  No shortness of breath, cough  Gastrointestinal:  No pain, + nausea, no vomiting, no diarrhea  Musculoskeletal:  No muscle pain, no joint pain  Skin:  No rash, no pruritis, no easy bruising  Neurologic:  No speech problems, + headache, no extremity numbness, no extremity tingling, no extremity weakness, no neck pain or stiffness  Extremities:  no edema or joint pain    Past Medical History:   Diagnosis Date    Arachnoid cyst     Arthritis     Chiari malformation type I (HCC)     Depression     IBS (irritable bowel syndrome)     Migraines     Seizures (HCC)      Past Surgical History:   Procedure Laterality Date    APPENDECTOMY      NASAL SEPTUM SURGERY       History reviewed. No pertinent family history.   Social History     Socioeconomic History    Marital status:      Spouse name: Philly    Number of children: 10    Years of education: Not on file    Highest education level: Not on file   Occupational History    Not on file   Tobacco Use    Smoking status: Current Every Day Smoker     Packs/day: 0.50 Years: 5.00     Pack years: 2.50     Types: Cigarettes     Last attempt to quit: 12/10/2020     Years since quittin.0    Smokeless tobacco: Never Used   Vaping Use    Vaping Use: Never used   Substance and Sexual Activity    Alcohol use: Yes     Comment: socially    Drug use: Not Currently     Types: Marijuana Veldon Bunting)    Sexual activity: Yes     Partners: Female   Other Topics Concern    Not on file   Social History Narrative    Not on file     Social Determinants of Health     Financial Resource Strain:     Difficulty of Paying Living Expenses: Not on file   Food Insecurity:     Worried About Running Out of Food in the Last Year: Not on file    Cecil of Food in the Last Year: Not on file   Transportation Needs:     Lack of Transportation (Medical): Not on file    Lack of Transportation (Non-Medical):  Not on file   Physical Activity:     Days of Exercise per Week: Not on file    Minutes of Exercise per Session: Not on file   Stress:     Feeling of Stress : Not on file   Social Connections:     Frequency of Communication with Friends and Family: Not on file    Frequency of Social Gatherings with Friends and Family: Not on file    Attends Nondenominational Services: Not on file    Active Member of [address] [place] [address] [employer] or Organizations: Not on file    Attends Club or Organization Meetings: Not on file    Marital Status: Not on file   Intimate Partner Violence:     Fear of Current or Ex-Partner: Not on file    Emotionally Abused: Not on file    Physically Abused: Not on file    Sexually Abused: Not on file   Housing Stability:     Unable to Pay for Housing in the Last Year: Not on file    Number of [place] in the Last Year: Not on file    Unstable Housing in the Last Year: Not on file     Current Facility-Administered Medications   Medication Dose Route Frequency Provider Last Rate Last Admin    metoclopramide (REGLAN) injection 10 mg  10 mg IntraVENous Once Montine Satmeseretl, DO        diphenhydrAMINE (BENADRYL) injection 25 mg  25 mg IntraVENous Once Meg Mcnulty, DO        0.9 % sodium chloride bolus  1,000 mL IntraVENous Once Meg Mcnulty,         ketorolac (TORADOL) injection 30 mg  30 mg IntraVENous Once Meg Mcnulty, DO         Current Outpatient Medications   Medication Sig Dispense Refill    dicyclomine (BENTYL) 20 MG tablet Take 1 tablet by mouth 3 times daily as needed (abd pain) 120 tablet 0    desvenlafaxine succinate (PRISTIQ) 50 MG TB24 extended release tablet Take 2 tablets by mouth daily Disregard previous prescription for 100 mg 90 tablet 1    levETIRAcetam (KEPPRA) 500 MG tablet Take 1 tablet by mouth 2 times daily 60 tablet 3     Allergies   Allergen Reactions    Ambien [Zolpidem Tartrate] Other (See Comments)     hallucinations    Wellbutrin [Bupropion] Hives    Codeine Nausea And Vomiting       Nursing Notes Reviewed    Physical Exam:  ED Triage Vitals [01/14/22 1323]   Enc Vitals Group      BP (!) 109/56      Pulse 104      Resp 18      Temp 100 °F (37.8 °C)      Temp Source Oral      SpO2 95 %      Weight 185 lb (83.9 kg)      Height       Head Circumference       Peak Flow       Pain Score       Pain Loc       Pain Edu? Excl. in [address]? General appearance:  Awake, alert, no acute distress. Skin:  Warm. Dry. No rash. Eye:  Extraocular movements intact. PERRL. Ears, nose, mouth and throat:  Oral mucosa moist. Normal posterior pharynx. Neck:  Supple. No meningismus. Extremity:  No edema. Normal ROM. Heart:  Regular rate and rhythm without murmurs. Respiratory: Respirations nonlabored. Clear to auscultation bilaterally. Abdominal:   Soft, Non tenderness, Non distended. Neurological:  Alert & oriented. Vision is intact. Pupils are equal, round and reactive to light bilaterally, EOM are intact, face is symmetrical, tongue is midline. Motor function and sensation are grossly intact. There is no pronator drift.  Finger to nose is normal. Lower extremity reflexes are +2. Speech is normal. Able to ambulate without difficulty. I have reviewed and interpreted all of the currently available lab results from this visit (if applicable):  No results found for this visit on 01/14/22. Chart review shows recent radiographs:  No results found. Differential Diagnosis: meningitis/encephalitis, intracrancial hemorrhage, mass lesion, temporal arteritis, vascular dissection, aneurysm, cluster, tension or migraine headache    The likelihood of other entities in the differential is insufficient to justify any further testing for them. This was explained to the patient. The patient was advised that persistent or worsening symptoms would require further evaluation. MDM:  44-year-old male who presented with migraine headache. This is a typical migraine for him. Is not maximal intensity, not worst of life. Has had similar headaches in the past.  His temp was 100, thus I did consider COVID as a diagnosis as that is known to cause headaches. His oxygen saturation is 95% and he appears in no distress. He has no respiratory complaints at this time. He is well-appearing and nontoxic. His neurologic exam is normal.  He was given medications for his headache. He is feeling better at this time. We will plan for discharge home with outpatient follow-up with primary care provider for recheck. Is instructed return with new or worsening signs or symptoms. We discussed that he may have COVID, he was agreeable to a COVID test as he states he has had a cough and congestion and also been exposed to coworkers with [employer].     Clinical Impression:  Headache       (Please note that portions of this note may have been completed with a voice recognition program. Efforts were made to edit the dictations but occasionally words are mis-transcribed.)      Cassie Nunn DO  01/14/22 [id number]

## 2022-01-16 ENCOUNTER — PATIENT MESSAGE (OUTPATIENT)
Dept: FAMILY MEDICINE CLINIC | Age: 37
End: 2022-01-16

## 2022-01-16 LAB
SARS-COV-2: DETECTED
SOURCE: ABNORMAL

## 2022-01-17 ENCOUNTER — HOSPITAL ENCOUNTER (EMERGENCY)
Age: 37
Discharge: HOME | End: 2022-01-17
Attending: EMERGENCY MEDICINE
Payer: COMMERCIAL

## 2022-01-17 VITALS
WEIGHT: 185 LBS | RESPIRATION RATE: 18 BRPM | BODY MASS INDEX: 25.9 KG/M2 | HEART RATE: 73 BPM | DIASTOLIC BLOOD PRESSURE: 79 MMHG | OXYGEN SATURATION: 98 % | TEMPERATURE: 98.3 F | SYSTOLIC BLOOD PRESSURE: 120 MMHG | HEIGHT: 71 IN

## 2022-01-17 DIAGNOSIS — R05.9 COUGH: ICD-10-CM

## 2022-01-17 DIAGNOSIS — U07.1 COVID-19 VIRUS INFECTION: Primary | ICD-10-CM

## 2022-01-17 PROCEDURE — 6370000000 HC RX 637 (ALT 250 FOR IP): Performed by: EMERGENCY MEDICINE

## 2022-01-17 PROCEDURE — 99283 EMERGENCY DEPT VISIT LOW MDM: CPT

## 2022-01-17 RX ORDER — BENZONATATE 100 MG/1
100 CAPSULE ORAL ONCE
Status: COMPLETED | OUTPATIENT
Start: 2022-01-17 | End: 2022-01-17

## 2022-01-17 RX ORDER — ALBUTEROL SULFATE 90 UG/1
2 AEROSOL, METERED RESPIRATORY (INHALATION) 4 TIMES DAILY PRN
Qty: 18 G | Refills: 0 | Status: SHIPPED | OUTPATIENT
Start: 2022-01-17

## 2022-01-17 RX ORDER — DIPHENHYDRAMINE HCL 50 MG
50 CAPSULE ORAL ONCE
Status: COMPLETED | OUTPATIENT
Start: 2022-01-17 | End: 2022-01-17

## 2022-01-17 RX ORDER — HYDROCODONE POLISTIREX AND CHLORPHENIRAMINE POLISTIREX 10; 8 MG/5ML; MG/5ML
5 SUSPENSION, EXTENDED RELEASE ORAL EVERY 12 HOURS PRN
Qty: 30 ML | Refills: 0 | Status: SHIPPED | OUTPATIENT
Start: 2022-01-17 | End: 2022-01-20

## 2022-01-17 RX ADMIN — DIPHENHYDRAMINE HYDROCHLORIDE 50 MG: 50 CAPSULE ORAL at 22:04

## 2022-01-17 RX ADMIN — BENZONATATE 100 MG: 100 CAPSULE ORAL at 22:04

## 2022-01-17 ASSESSMENT — ENCOUNTER SYMPTOMS
COUGH: 1
SORE THROAT: 1
EYES NEGATIVE: 1
SHORTNESS OF BREATH: 0
GASTROINTESTINAL NEGATIVE: 1
RHINORRHEA: 1
SINUS CONGESTION: 1

## 2022-01-17 NOTE — TELEPHONE ENCOUNTER
From: [place]. To: Navdeep Cannon  Sent: 1/16/2022 8:09 AM EST  Subject: Question regarding FILNO-21    Is says I have sars-cov-2 is detected so this is saying I have covid? Or just a precursor?

## 2022-01-18 NOTE — ED PROVIDER NOTES
The history is provided by the patient. Cough  Cough characteristics:  Productive  Sputum characteristics:  Yellow  Severity:  Moderate  Onset quality:  Gradual  Timing:  Constant  Progression:  Worsening  Chronicity:  New  Context: upper respiratory infection    Context comment:  Covid positve  Relieved by:  Nothing  Worsened by:  Deep breathing, activity and lying down  Ineffective treatments:  None tried  Associated symptoms: chest pain, ear fullness, ear pain, fever, rhinorrhea, sinus congestion and sore throat    Associated symptoms: no diaphoresis and no shortness of breath    Chest pain:     Quality: aching and dull      Timing:  Intermittent    Progression:  Waxing and waning (Noticeable with coughing)      Review of Systems   Constitutional: Positive for fever. Negative for diaphoresis. HENT: Positive for ear pain, rhinorrhea and sore throat. Eyes: Negative. Respiratory: Positive for cough. Negative for shortness of breath. Cardiovascular: Positive for chest pain. Gastrointestinal: Negative. Genitourinary: Negative. Musculoskeletal: Negative. Skin: Negative. Neurological: Negative. All other systems reviewed and are negative. History reviewed. No pertinent family history.   Social History     Socioeconomic History    Marital status:      Spouse name: Philly    Number of children: 10    Years of education: Not on file    Highest education level: Not on file   Occupational History    Not on file   Tobacco Use    Smoking status: Current Every Day Smoker     Packs/day: 0.50     Years: 5.00     Pack years: 2.50     Types: Cigarettes     Last attempt to quit: 12/10/2020     Years since quittin.1    Smokeless tobacco: Never Used   Vaping Use    Vaping Use: Never used   Substance and Sexual Activity    Alcohol use: Yes     Comment: socially    Drug use: Not Currently     Types: Marijuana Don Safer)    Sexual activity: Yes     Partners: Female   Other Topics (TUSSIONEX PENNKINETIC ER) 10-8 MG/5ML SUER Take 5 mLs by mouth every 12 hours as needed (cough) for up to 3 days. 1/17/22 1/20/22 Yes Cesia Lake DO   albuterol sulfate HFA (VENTOLIN HFA) 108 (90 Base) MCG/ACT inhaler Inhale 2 puffs into the lungs 4 times daily as needed for Wheezing 1/17/22  Yes Cesia Lake DO   dicyclomine (BENTYL) 20 MG tablet Take 1 tablet by mouth 3 times daily as needed (abd pain) 12/17/21   GUERLINE Foster   desvenlafaxine succinate (PRISTIQ) 50 MG TB24 extended release tablet Take 2 tablets by mouth daily Disregard previous prescription for 100 mg 8/18/21   GUERLINE Foster   levETIRAcetam (KEPPRA) 500 MG tablet Take 1 tablet by mouth 2 times daily 6/17/20   Nii Acevedo MD       /79   Pulse 73   Temp 98.3 °F (36.8 °C) (Oral)   Resp 18   Ht 5' 11\" (1.803 m)   Wt 185 lb (83.9 kg)   SpO2 98%   BMI 25.80 kg/m²     Physical Exam  Vitals and nursing note reviewed. Constitutional:       Appearance: He is well-developed. He is not ill-appearing. HENT:      Head: Normocephalic and atraumatic. Right Ear: Tympanic membrane is erythematous and retracted. Left Ear: Tympanic membrane is erythematous and retracted. Nose: Mucosal edema and rhinorrhea present. Mouth/Throat:      Pharynx: Uvula midline. Posterior oropharyngeal erythema present. Eyes:      Conjunctiva/sclera: Conjunctivae normal.      Pupils: Pupils are equal, round, and reactive to light. Neck:      Vascular: No carotid bruit. Trachea: Trachea and phonation normal.      Meningeal: Brudzinski's sign and Kernig's sign absent. Cardiovascular:      Rate and Rhythm: Normal rate. Pulmonary:      Effort: Pulmonary effort is normal. No respiratory distress. Breath sounds: Decreased air movement present. Decreased breath sounds present. No wheezing or rhonchi. Abdominal:      General: Bowel sounds are normal. There is no distension.       Palpations: Abdomen is soft.      Tenderness: There is no abdominal tenderness. Musculoskeletal:         General: No tenderness. Normal range of motion. Cervical back: Normal range of motion and neck supple. Skin:     General: Skin is warm and dry. Neurological:      Mental Status: He is alert and oriented to person, place, and time. Deep Tendon Reflexes: Reflexes are normal and symmetric. Psychiatric:         Thought Content: Thought content normal.         MDM:    Labs Reviewed - No data to display    No orders to display        Supportive care  I have discussed with the patient  my clinical impression and the result of the patient's current clinical evaluation for their presentation. In addition we discussed the risk and benefits of further testing I discussed candidly with the patient  and the patient  was allowed to provide input as to their thoughts concerning the current presentation. Although the risk of progression or development of new more serious signs and symptoms cannot be excluded he has not had optimized management for outpatient   My typical dicussion, presentation,and considerations for this patients' chief complaint, diagnosis, and differential diagnosis have been considered. I have stressed need for follow up and reexamination for this encounter. I have discussed my clinical impression and the results of the current evaluation. Patient was  prescribed Victus, albuterol MDI. The medication(s) use,  medication(s) safety and medication(s) interactions with already prescribed medication(s) have been explained and outlined for this encounter. The patient  was educated that it is their responsibility to verify this information is correct at the time of discharge and to contact this department of any complications with the pharmacy providing this medication(s) or if their any difficulty in obtaining this medication(s). Final Impression    1. COVID-19 virus infection    2.  Cough [address] [facility],   01/17/22 2205

## 2022-02-20 DIAGNOSIS — R10.9 ABDOMINAL DISCOMFORT: ICD-10-CM

## 2022-02-20 NOTE — LETTER
[facility] AT [facility] CHANDNI Mathews [address] [place] [id number]  Phone: [phone]  Fax: [place], [place]        February 22, 2022    [address]  [facility] [address]      Dear Jere Whitlock:    Your medication that you requested has not been refilled at this time. Please note that you will need an appointment with your provider to continue getting your refills in a timely manner. If you were a former patient of Bruce CUNHA, please note you will need to establish with another provider in our office to continue to request medication refills      If you have any questions or concerns, please don't hesitate to call.     Sincerely,        GUERLINE Guillory

## 2022-02-21 RX ORDER — DICYCLOMINE HCL 20 MG
20 TABLET ORAL 3 TIMES DAILY PRN
Qty: 90 TABLET | Refills: 1 | OUTPATIENT
Start: 2022-02-21

## 2022-03-04 DIAGNOSIS — R10.9 ABDOMINAL DISCOMFORT: ICD-10-CM

## 2022-03-07 RX ORDER — DICYCLOMINE HCL 20 MG
20 TABLET ORAL 3 TIMES DAILY PRN
Qty: 120 TABLET | Refills: 0 | Status: SHIPPED | OUTPATIENT
Start: 2022-03-07 | End: 2022-03-30

## 2022-03-30 DIAGNOSIS — R10.9 ABDOMINAL DISCOMFORT: ICD-10-CM

## 2022-03-30 RX ORDER — DICYCLOMINE HCL 20 MG
20 TABLET ORAL 3 TIMES DAILY PRN
Qty: 90 TABLET | Refills: 1 | Status: SHIPPED | OUTPATIENT
Start: 2022-03-30 | End: 2022-05-13 | Stop reason: SDUPTHER

## 2022-05-03 ENCOUNTER — TELEPHONE (OUTPATIENT)
Dept: FAMILY MEDICINE CLINIC | Age: 37
End: 2022-05-03

## 2022-05-03 NOTE — TELEPHONE ENCOUNTER
----- Message from [address] sent at 5/2/2022  4:46 PM EDT -----  Subject: Appointment Request    Reason for Call: Routine Existing Condition Follow Up    QUESTIONS  Type of Appointment? New Patient/New to Provider  Reason for appointment request? Available appointments did not meet   patient need  Additional Information for Provider? Daphne Grewal patient, he is wanting   to reschedule the appointment he had for 3/9/22.   ---------------------------------------------------------------------------  --------------  CALL BACK INFO  What is the best way for the office to contact you? OK to leave message on   voicemail  Preferred Call Back Phone Number? [phone]  ---------------------------------------------------------------------------  --------------  SCRIPT ANSWERS  Relationship to Patient? Self  Is this follow up request related to routine Diabetes Management? No  Have you been diagnosed with, awaiting test results for, or told that you   are suspected of having COVID-19 (Coronavirus)? (If patient has tested   negative or was tested as a requirement for work, school, or travel and   not based on symptoms, answer no)? No  Within the past 10 days have you developed any of the following symptoms   (answer no if symptoms have been present longer than 10 days or began   more than 10 days ago)? Fever or Chills, Cough, Shortness of breath or   difficulty breathing, Loss of taste or smell, Sore throat, Nasal   congestion, Sneezing or runny nose, Fatigue or generalized body aches   (answer no if pain is specific to a body part e.g. back pain), Diarrhea,   Headache? No  Have you had close contact with someone with COVID-19 in the last 7 days? No  (Service Expert  click yes below to proceed with Ecelles Carson As Usual   Scheduling)?  Yes

## 2022-05-12 DIAGNOSIS — R10.9 ABDOMINAL DISCOMFORT: ICD-10-CM

## 2022-05-12 RX ORDER — DICYCLOMINE HCL 20 MG
TABLET ORAL
Qty: 90 TABLET | Refills: 1 | OUTPATIENT
Start: 2022-05-12

## 2022-05-13 ENCOUNTER — OFFICE VISIT (OUTPATIENT)
Dept: FAMILY MEDICINE CLINIC | Age: 37
End: 2022-05-13
Payer: COMMERCIAL

## 2022-05-13 VITALS
WEIGHT: 183.6 LBS | RESPIRATION RATE: 15 BRPM | HEART RATE: 56 BPM | SYSTOLIC BLOOD PRESSURE: 110 MMHG | TEMPERATURE: 98.3 F | BODY MASS INDEX: 25.61 KG/M2 | DIASTOLIC BLOOD PRESSURE: 70 MMHG | OXYGEN SATURATION: 98 %

## 2022-05-13 DIAGNOSIS — F33.1 MODERATE EPISODE OF RECURRENT MAJOR DEPRESSIVE DISORDER (HCC): ICD-10-CM

## 2022-05-13 DIAGNOSIS — R10.9 ABDOMINAL DISCOMFORT: ICD-10-CM

## 2022-05-13 DIAGNOSIS — F41.1 GAD (GENERALIZED ANXIETY DISORDER): ICD-10-CM

## 2022-05-13 PROCEDURE — 99214 OFFICE O/P EST MOD 30 MIN: CPT | Performed by: FAMILY MEDICINE

## 2022-05-13 RX ORDER — DICYCLOMINE HCL 20 MG
20 TABLET ORAL 3 TIMES DAILY PRN
Qty: 90 TABLET | Refills: 1 | Status: SHIPPED | OUTPATIENT
Start: 2022-05-13 | End: 2022-06-22

## 2022-05-13 RX ORDER — DESVENLAFAXINE 50 MG/1
100 TABLET, EXTENDED RELEASE ORAL DAILY
Qty: 90 TABLET | Refills: 1 | Status: SHIPPED | OUTPATIENT
Start: 2022-05-13 | End: 2022-06-10 | Stop reason: SDUPTHER

## 2022-05-13 ASSESSMENT — PATIENT HEALTH QUESTIONNAIRE - PHQ9
SUM OF ALL RESPONSES TO PHQ QUESTIONS 1-9: 0
SUM OF ALL RESPONSES TO PHQ9 QUESTIONS 1 & 2: 0
SUM OF ALL RESPONSES TO PHQ QUESTIONS 1-9: 0
1. LITTLE INTEREST OR PLEASURE IN DOING THINGS: 0
SUM OF ALL RESPONSES TO PHQ QUESTIONS 1-9: 0
SUM OF ALL RESPONSES TO PHQ QUESTIONS 1-9: 0
2. FEELING DOWN, DEPRESSED OR HOPELESS: 0

## 2022-05-13 NOTE — PROGRESS NOTES
[facility]  [address] [place] [address]  Dept: [phone]  Loc: [phone]        ASSESSMENT/PLAN:    1. Moderate episode of recurrent major depressive disorder (HCC)  -     desvenlafaxine succinate (PRISTIQ) 50 MG TB24 extended release tablet; Take 2 tablets by mouth daily Disregard previous prescription for 100 mg, Disp-90 tablet, R-1Normal  - Generalized anxiety disorder and major depressive disorder stable on Pristiq. Patient reports taking his medication as prescribed. Patient was encouraged to continue. 2. ISSAC (generalized anxiety disorder)  3. Abdominal discomfort  -     dicyclomine (BENTYL) 20 MG tablet; Take 1 tablet by mouth 3 times daily as needed (abd pain), Disp-90 tablet, R-1Normal    Patient reports a history of abdominal discomfort/IBS. Patient denies any acute exacerbation and reports significant improvement with Bentyl    Return in about 6 months (around 11/13/2022). Patient's Name: Lorna Berrios YOB: 1985   Age: 31 y.o. Date of service: 5/13/2022    Chief Complaint:   Chief Complaint   Patient presents with    Medication Refill     patient is here for medication refill        Patient ID: Lorna Cooley. is a 31 y.o. male. Chief Complaint   Patient presents with    Medication Refill     patient is here for medication refill       [facility]    Patient is a 30-year-old male who presents to the office for follow-up. Patient reports a history of generalized anxiety disorder, major depressive disorder, seizures, and IBS. Patient reports that he has been taking his medications as prescribed and denies any acute episodes. Patient is requesting refill of his medications.   Patient denies headaches, lightheadedness or dizziness  12 point review of systems were negative other than in the HPI     Physical Exam  General: alert, awake, and oriented to time, place, person, and situation. Not in acute distress   Ear, nose, throat, Head: Normal external ears, pupils are equally round, EOMI, normocephalic/atraumatic  Heart: regular rate and rhythm, no audible murmurs, no audible friction rub  Lungs: clear to auscultation bilaterally, no audible crackles, no audible wheezes, no audible rhonchi    Abdomen: normal bowel sounds, soft abdomen, non-tender, no palpable masses  Extremities: no edema, warm, no cyanosis, no clubbing.  Good capillary refill   Peripheral vascular: 2+ pulses symmetric (radial)  Neuro: sensation intact and symmetric  Psych: normal affect, normal thoughts     Patient History:  Past Medical History:   Diagnosis Date    Arachnoid cyst     Arthritis     Chiari malformation type I ([facility] [address]     Depression     IBS (irritable bowel syndrome)     Migraines     Seizures (HCC)      Past Surgical History:   Procedure Laterality Date    APPENDECTOMY      NASAL SEPTUM SURGERY       Social History     Socioeconomic History    Marital status:      Spouse name: Philly    Number of children: 10    Years of education: Not on file    Highest education level: Not on file   Occupational History    Not on file   Tobacco Use    Smoking status: Current Every Day Smoker     Packs/day: 0.50     Years: 5.00     Pack years: 2.50     Types: Cigarettes     Last attempt to quit: 12/10/2020     Years since quittin.4    Smokeless tobacco: Never Used   Vaping Use    Vaping Use: Never used   Substance and Sexual Activity    Alcohol use: Yes     Comment: socially    Drug use: Not Currently     Types: Marijuana [place])    Sexual activity: Yes     Partners: Female   Other Topics Concern    Not on file   Social History Narrative    Not on file     Social Determinants of Health     Financial Resource Strain:     Difficulty of Paying Living Expenses: Not on file   Food Insecurity:     Worried About Running Out of Food in the Last Year: Not on file    [address] in the Last Year: Not on file   Transportation Needs:     Lack of Transportation (Medical): Not on file    Lack of Transportation (Non-Medical): Not on file   Physical Activity:     Days of Exercise per Week: Not on file    Minutes of Exercise per Session: Not on file   Stress:     Feeling of Stress : Not on file   Social Connections:     Frequency of Communication with Friends and Family: Not on file    Frequency of Social Gatherings with Friends and Family: Not on file    Attends Hindu Services: Not on file    Active Member of [address] [place] [address] [employer] or Organizations: Not on file    Attends Club or Organization Meetings: Not on file    Marital Status: Not on file   Intimate Partner Violence:     Fear of Current or Ex-Partner: Not on file    Emotionally Abused: Not on file    Physically Abused: Not on file    Sexually Abused: Not on file   Housing Stability:     Unable to Pay for Housing in the Last Year: Not on file    Number of [place] in the Last Year: Not on file    Unstable Housing in the Last Year: Not on file     Immunization History   Administered Date(s) Administered    DTP 01/12/1988, 03/15/1988, 06/14/1988, 03/03/1989, 07/09/1993    Hepatitis B 05/19/2000, 10/25/2000, 01/18/2002    Hib vaccine 03/29/1991    MMR 03/03/1989, 03/31/2000    Pneumococcal Polysaccharide ([id number]) 12/17/2019    Polio OPV 01/12/1988, 03/15/1988, 03/03/1989, 07/09/1993    Tdap (Boostrix, Adacel) 12/13/2021     Allergies   Allergen Reactions    Ambien [Zolpidem Tartrate] Other (See Comments)     hallucinations    Wellbutrin [Bupropion] Hives    Codeine Nausea And Vomiting     History reviewed. No pertinent family history.       Current Outpatient Medications   Medication Sig Dispense Refill    dicyclomine (BENTYL) 20 MG tablet Take 1 tablet by mouth 3 times daily as needed (abd pain) 90 tablet 1    desvenlafaxine succinate (PRISTIQ) 50 MG TB24 extended release tablet Take 2 tablets by mouth daily Disregard previous prescription for 100 mg 90 tablet 1    levETIRAcetam (KEPPRA) 500 MG tablet Take 1 tablet by mouth 2 times daily 60 tablet 3    albuterol sulfate HFA (VENTOLIN HFA) 108 (90 Base) MCG/ACT inhaler Inhale 2 puffs into the lungs 4 times daily as needed for Wheezing (Patient not taking: Reported on 5/13/2022) 18 g 0     No current facility-administered medications for this visit. Objective:  Vitals:  Vitals:    05/13/22 0913   BP: 110/70   Pulse: 56   Resp: 15   Temp: 98.3 °F (36.8 °C)   SpO2: 98%      BP Readings from Last 4 Encounters:   05/13/22 110/70   01/17/22 120/79   01/14/22 (!) 112/53   12/13/21 117/77      Body mass index is 25.61 kg/m². All questions answered to patient's satisfaction. The patient was counseled regarding impressions, instructions for management and importance of compliance with treatment. Return in about 6 months (around 11/13/2022).     Raquel Woodson MD

## 2022-06-10 DIAGNOSIS — F33.1 MODERATE EPISODE OF RECURRENT MAJOR DEPRESSIVE DISORDER (HCC): ICD-10-CM

## 2022-06-10 RX ORDER — DESVENLAFAXINE 50 MG/1
100 TABLET, EXTENDED RELEASE ORAL DAILY
Qty: 90 TABLET | Refills: 0 | Status: SHIPPED | OUTPATIENT
Start: 2022-06-10

## 2022-06-10 RX ORDER — DESVENLAFAXINE 50 MG/1
100 TABLET, EXTENDED RELEASE ORAL DAILY
Qty: 60 TABLET | Refills: 2 | OUTPATIENT
Start: 2022-06-10

## 2022-06-22 DIAGNOSIS — R10.9 ABDOMINAL DISCOMFORT: ICD-10-CM

## 2022-06-22 RX ORDER — DICYCLOMINE HCL 20 MG
TABLET ORAL
Qty: 90 TABLET | Refills: 1 | Status: SHIPPED | OUTPATIENT
Start: 2022-06-22 | End: 2022-07-18 | Stop reason: SDUPTHER

## 2022-07-18 DIAGNOSIS — R10.9 ABDOMINAL DISCOMFORT: ICD-10-CM

## 2022-07-18 RX ORDER — DICYCLOMINE HCL 20 MG
TABLET ORAL
Qty: 90 TABLET | Refills: 1 | Status: SHIPPED | OUTPATIENT
Start: 2022-07-18 | End: 2022-08-15

## 2022-08-13 DIAGNOSIS — R10.9 ABDOMINAL DISCOMFORT: ICD-10-CM

## 2022-08-15 RX ORDER — DICYCLOMINE HCL 20 MG
TABLET ORAL
Qty: 90 TABLET | Refills: 1 | Status: SHIPPED | OUTPATIENT
Start: 2022-08-15

## 2022-11-21 ENCOUNTER — TELEPHONE (OUTPATIENT)
Dept: FAMILY MEDICINE CLINIC | Age: 37
End: 2022-11-21

## 2022-11-21 DIAGNOSIS — R10.9 ABDOMINAL DISCOMFORT: ICD-10-CM

## 2022-11-21 RX ORDER — DICYCLOMINE HCL 20 MG
20 TABLET ORAL 3 TIMES DAILY PRN
Qty: 90 TABLET | Refills: 0 | Status: SHIPPED | OUTPATIENT
Start: 2022-11-21

## 2022-11-21 NOTE — TELEPHONE ENCOUNTER
----- Message from Gissell Juares sent at 11/21/2022 10:17 AM EST -----  Subject: Refill Request    QUESTIONS  Name of Medication? dicyclomine (BENTYL) 20 MG tablet  Patient-reported dosage and instructions? dicyclomine (BENTYL) 20 MG   tablet and TAKE 1 TABLET BY MOUTH 3 TIMES DAILY AS NEEDED (ABDOMINAL PAIN)  How many days do you have left? 20  Preferred Pharmacy? [facility]Y #[id number]  Pharmacy phone number (if available)? [phone]  Additional Information for Provider? used to be a patient of Dr. Jae Moreno is   needing refills for the above medication until he can see his new provider   on 12/22 please advise and would like a call back  ---------------------------------------------------------------------------  --------------  [address]  What is the best way for the office to contact you? OK to leave message on   voicemail  Preferred Call Back Phone Number? [phone]  ---------------------------------------------------------------------------  --------------  SCRIPT ANSWERS  Relationship to Patient?  Self

## 2022-12-15 ENCOUNTER — HOSPITAL ENCOUNTER (INPATIENT)
Age: 37
LOS: 2 days | Discharge: HOME | End: 2022-12-17
Attending: STUDENT IN AN ORGANIZED HEALTH CARE EDUCATION/TRAINING PROGRAM
Payer: COMMERCIAL

## 2022-12-15 ENCOUNTER — HOSPITAL ENCOUNTER (EMERGENCY)
Age: 37
Discharge: ANOTHER HOSPITAL | End: 2022-12-15
Attending: EMERGENCY MEDICINE
Payer: COMMERCIAL

## 2022-12-15 ENCOUNTER — APPOINTMENT (OUTPATIENT)
Dept: GENERAL RADIOLOGY | Age: 37
End: 2022-12-15
Payer: COMMERCIAL

## 2022-12-15 ENCOUNTER — APPOINTMENT (OUTPATIENT)
Dept: CT IMAGING | Age: 37
End: 2022-12-15
Payer: COMMERCIAL

## 2022-12-15 VITALS
OXYGEN SATURATION: 100 % | HEIGHT: 71 IN | HEART RATE: 69 BPM | BODY MASS INDEX: 24.5 KG/M2 | WEIGHT: 175 LBS | DIASTOLIC BLOOD PRESSURE: 78 MMHG | RESPIRATION RATE: 18 BRPM | SYSTOLIC BLOOD PRESSURE: 115 MMHG

## 2022-12-15 DIAGNOSIS — R55 NEAR SYNCOPE: ICD-10-CM

## 2022-12-15 DIAGNOSIS — R07.9 CHEST PAIN, UNSPECIFIED TYPE: ICD-10-CM

## 2022-12-15 DIAGNOSIS — R00.1 BRADYCARDIA: Primary | ICD-10-CM

## 2022-12-15 LAB
ALBUMIN SERPL-MCNC: 4.2 GM/DL (ref 3.4–5)
ALP BLD-CCNC: 84 IU/L (ref 40–129)
ALT SERPL-CCNC: 13 U/L (ref 10–40)
ANION GAP SERPL CALCULATED.3IONS-SCNC: 14 MMOL/L (ref 4–16)
AST SERPL-CCNC: 17 IU/L (ref 15–37)
BASOPHILS ABSOLUTE: 0.1 K/CU MM
BASOPHILS RELATIVE PERCENT: 0.6 % (ref 0–1)
BILIRUB SERPL-MCNC: 0.2 MG/DL (ref 0–1)
BILIRUBIN URINE: NEGATIVE MG/DL
BLOOD, URINE: NEGATIVE
BUN BLDV-MCNC: 12 MG/DL (ref 6–23)
CALCIUM SERPL-MCNC: 9 MG/DL (ref 8.3–10.6)
CHLORIDE BLD-SCNC: 104 MMOL/L (ref 99–110)
CLARITY: CLEAR
CO2: 20 MMOL/L (ref 21–32)
COLOR: YELLOW
COMMENT UA: NORMAL
CREAT SERPL-MCNC: 0.6 MG/DL (ref 0.9–1.3)
DIFFERENTIAL TYPE: ABNORMAL
EKG ATRIAL RATE: 60 BPM
EKG DIAGNOSIS: NORMAL
EKG P AXIS: 58 DEGREES
EKG P-R INTERVAL: 140 MS
EKG Q-T INTERVAL: 368 MS
EKG QRS DURATION: 92 MS
EKG QTC CALCULATION (BAZETT): 368 MS
EKG R AXIS: 18 DEGREES
EKG T AXIS: 49 DEGREES
EKG VENTRICULAR RATE: 60 BPM
EOSINOPHILS ABSOLUTE: 0.2 K/CU MM
EOSINOPHILS RELATIVE PERCENT: 2.4 % (ref 0–3)
GFR SERPL CREATININE-BSD FRML MDRD: >60 ML/MIN/1.73M2
GLUCOSE BLD-MCNC: 118 MG/DL (ref 70–99)
GLUCOSE, URINE: NEGATIVE MG/DL
HCT VFR BLD CALC: 42.1 % (ref 42–52)
HEMOGLOBIN: 14.6 GM/DL (ref 13.5–18)
IMMATURE NEUTROPHIL %: 0.2 % (ref 0–0.43)
KETONES, URINE: NEGATIVE MG/DL
LEUKOCYTE ESTERASE, URINE: NEGATIVE
LYMPHOCYTES ABSOLUTE: 3.6 K/CU MM
LYMPHOCYTES RELATIVE PERCENT: 39.1 % (ref 24–44)
MAGNESIUM: 2 MG/DL (ref 1.8–2.4)
MCH RBC QN AUTO: 29.9 PG (ref 27–31)
MCHC RBC AUTO-ENTMCNC: 34.7 % (ref 32–36)
MCV RBC AUTO: 86.1 FL (ref 78–100)
MONOCYTES ABSOLUTE: 0.8 K/CU MM
MONOCYTES RELATIVE PERCENT: 8.3 % (ref 0–4)
NITRITE URINE, QUANTITATIVE: NEGATIVE
PDW BLD-RTO: 12.7 % (ref 11.7–14.9)
PH, URINE: 6.5 (ref 5–8)
PLATELET # BLD: 305 K/CU MM (ref 140–440)
PMV BLD AUTO: 11.2 FL (ref 7.5–11.1)
POTASSIUM SERPL-SCNC: 3.9 MMOL/L (ref 3.5–5.1)
PROTEIN UA: NEGATIVE MG/DL
RBC # BLD: 4.89 M/CU MM (ref 4.6–6.2)
SEGMENTED NEUTROPHILS ABSOLUTE COUNT: 4.6 K/CU MM
SEGMENTED NEUTROPHILS RELATIVE PERCENT: 49.4 % (ref 36–66)
SODIUM BLD-SCNC: 138 MMOL/L (ref 135–145)
SPECIFIC GRAVITY UA: 1.02 (ref 1–1.03)
TOTAL IMMATURE NEUTOROPHIL: 0.02 K/CU MM
TOTAL PROTEIN: 6.6 GM/DL (ref 6.4–8.2)
TROPONIN T: <0.01 NG/ML
TROPONIN T: <0.01 NG/ML
UROBILINOGEN, URINE: 0.2 MG/DL (ref 0.2–1)
WBC # BLD: 9.3 K/CU MM (ref 4–10.5)

## 2022-12-15 PROCEDURE — 84484 ASSAY OF TROPONIN QUANT: CPT

## 2022-12-15 PROCEDURE — 93005 ELECTROCARDIOGRAM TRACING: CPT | Performed by: EMERGENCY MEDICINE

## 2022-12-15 PROCEDURE — 96360 HYDRATION IV INFUSION INIT: CPT

## 2022-12-15 PROCEDURE — 6370000000 HC RX 637 (ALT 250 FOR IP): Performed by: EMERGENCY MEDICINE

## 2022-12-15 PROCEDURE — 6370000000 HC RX 637 (ALT 250 FOR IP): Performed by: INTERNAL MEDICINE

## 2022-12-15 PROCEDURE — 6360000004 HC RX CONTRAST MEDICATION: Performed by: EMERGENCY MEDICINE

## 2022-12-15 PROCEDURE — 99285 EMERGENCY DEPT VISIT HI MDM: CPT

## 2022-12-15 PROCEDURE — 80053 COMPREHEN METABOLIC PANEL: CPT

## 2022-12-15 PROCEDURE — 81003 URINALYSIS AUTO W/O SCOPE: CPT

## 2022-12-15 PROCEDURE — 93010 ELECTROCARDIOGRAM REPORT: CPT | Performed by: INTERNAL MEDICINE

## 2022-12-15 PROCEDURE — 85025 COMPLETE CBC W/AUTO DIFF WBC: CPT

## 2022-12-15 PROCEDURE — 6360000002 HC RX W HCPCS: Performed by: INTERNAL MEDICINE

## 2022-12-15 PROCEDURE — 2140000000 HC CCU INTERMEDIATE R&B

## 2022-12-15 PROCEDURE — 2580000003 HC RX 258: Performed by: EMERGENCY MEDICINE

## 2022-12-15 PROCEDURE — 2580000003 HC RX 258: Performed by: INTERNAL MEDICINE

## 2022-12-15 PROCEDURE — 83735 ASSAY OF MAGNESIUM: CPT

## 2022-12-15 PROCEDURE — 71046 X-RAY EXAM CHEST 2 VIEWS: CPT

## 2022-12-15 PROCEDURE — 36415 COLL VENOUS BLD VENIPUNCTURE: CPT

## 2022-12-15 PROCEDURE — 71275 CT ANGIOGRAPHY CHEST: CPT

## 2022-12-15 PROCEDURE — 94761 N-INVAS EAR/PLS OXIMETRY MLT: CPT

## 2022-12-15 RX ORDER — DICYCLOMINE HYDROCHLORIDE 10 MG/1
10 CAPSULE ORAL ONCE
Status: COMPLETED | OUTPATIENT
Start: 2022-12-15 | End: 2022-12-15

## 2022-12-15 RX ORDER — ACETAMINOPHEN 325 MG/1
650 TABLET ORAL EVERY 6 HOURS PRN
Status: DISCONTINUED | OUTPATIENT
Start: 2022-12-15 | End: 2022-12-17 | Stop reason: HOSPADM

## 2022-12-15 RX ORDER — DESVENLAFAXINE 100 MG/1
100 TABLET, EXTENDED RELEASE ORAL DAILY
Status: DISCONTINUED | OUTPATIENT
Start: 2022-12-15 | End: 2022-12-17 | Stop reason: HOSPADM

## 2022-12-15 RX ORDER — POLYETHYLENE GLYCOL 3350 17 G/17G
17 POWDER, FOR SOLUTION ORAL DAILY PRN
Status: DISCONTINUED | OUTPATIENT
Start: 2022-12-15 | End: 2022-12-17 | Stop reason: HOSPADM

## 2022-12-15 RX ORDER — ONDANSETRON 2 MG/ML
4 INJECTION INTRAMUSCULAR; INTRAVENOUS EVERY 6 HOURS PRN
Status: DISCONTINUED | OUTPATIENT
Start: 2022-12-15 | End: 2022-12-17 | Stop reason: HOSPADM

## 2022-12-15 RX ORDER — ASPIRIN 81 MG/1
81 TABLET, CHEWABLE ORAL DAILY
Status: DISCONTINUED | OUTPATIENT
Start: 2022-12-16 | End: 2022-12-17 | Stop reason: HOSPADM

## 2022-12-15 RX ORDER — SODIUM CHLORIDE 0.9 % (FLUSH) 0.9 %
5-40 SYRINGE (ML) INJECTION EVERY 12 HOURS SCHEDULED
Status: DISCONTINUED | OUTPATIENT
Start: 2022-12-15 | End: 2022-12-17 | Stop reason: HOSPADM

## 2022-12-15 RX ORDER — ASPIRIN 81 MG/1
324 TABLET, CHEWABLE ORAL ONCE
Status: COMPLETED | OUTPATIENT
Start: 2022-12-15 | End: 2022-12-15

## 2022-12-15 RX ORDER — ACETAMINOPHEN 650 MG/1
650 SUPPOSITORY RECTAL EVERY 6 HOURS PRN
Status: DISCONTINUED | OUTPATIENT
Start: 2022-12-15 | End: 2022-12-17 | Stop reason: HOSPADM

## 2022-12-15 RX ORDER — DICYCLOMINE HCL 20 MG
20 TABLET ORAL
Status: DISCONTINUED | OUTPATIENT
Start: 2022-12-15 | End: 2022-12-17 | Stop reason: HOSPADM

## 2022-12-15 RX ORDER — ONDANSETRON 4 MG/1
4 TABLET, ORALLY DISINTEGRATING ORAL EVERY 8 HOURS PRN
Status: DISCONTINUED | OUTPATIENT
Start: 2022-12-15 | End: 2022-12-17 | Stop reason: HOSPADM

## 2022-12-15 RX ORDER — SODIUM CHLORIDE 9 MG/ML
INJECTION, SOLUTION INTRAVENOUS PRN
Status: DISCONTINUED | OUTPATIENT
Start: 2022-12-15 | End: 2022-12-17 | Stop reason: HOSPADM

## 2022-12-15 RX ORDER — 0.9 % SODIUM CHLORIDE 0.9 %
1000 INTRAVENOUS SOLUTION INTRAVENOUS ONCE
Status: COMPLETED | OUTPATIENT
Start: 2022-12-15 | End: 2022-12-15

## 2022-12-15 RX ORDER — ENOXAPARIN SODIUM 100 MG/ML
40 INJECTION SUBCUTANEOUS DAILY
Status: DISCONTINUED | OUTPATIENT
Start: 2022-12-15 | End: 2022-12-17 | Stop reason: HOSPADM

## 2022-12-15 RX ORDER — LEVETIRACETAM 500 MG/1
500 TABLET ORAL 2 TIMES DAILY
Status: DISCONTINUED | OUTPATIENT
Start: 2022-12-15 | End: 2022-12-17 | Stop reason: HOSPADM

## 2022-12-15 RX ORDER — ATORVASTATIN CALCIUM 40 MG/1
40 TABLET, FILM COATED ORAL NIGHTLY
Status: DISCONTINUED | OUTPATIENT
Start: 2022-12-15 | End: 2022-12-17 | Stop reason: HOSPADM

## 2022-12-15 RX ORDER — SODIUM CHLORIDE 0.9 % (FLUSH) 0.9 %
5-40 SYRINGE (ML) INJECTION PRN
Status: DISCONTINUED | OUTPATIENT
Start: 2022-12-15 | End: 2022-12-17 | Stop reason: HOSPADM

## 2022-12-15 RX ADMIN — IOPAMIDOL 100 ML: 755 INJECTION, SOLUTION INTRAVENOUS at 12:07

## 2022-12-15 RX ADMIN — SODIUM CHLORIDE, PRESERVATIVE FREE 10 ML: 5 INJECTION INTRAVENOUS at 22:42

## 2022-12-15 RX ADMIN — ATORVASTATIN CALCIUM 40 MG: 40 TABLET, FILM COATED ORAL at 22:41

## 2022-12-15 RX ADMIN — DESVENLAFAXINE SUCCINATE 100 MG: 50 TABLET, EXTENDED RELEASE ORAL at 22:41

## 2022-12-15 RX ADMIN — SODIUM CHLORIDE 1000 ML: 9 INJECTION, SOLUTION INTRAVENOUS at 14:56

## 2022-12-15 RX ADMIN — ENOXAPARIN SODIUM 40 MG: 100 INJECTION SUBCUTANEOUS at 22:41

## 2022-12-15 RX ADMIN — LEVETIRACETAM 500 MG: 500 TABLET, FILM COATED ORAL at 22:41

## 2022-12-15 RX ADMIN — DICYCLOMINE HYDROCHLORIDE 10 MG: 10 CAPSULE ORAL at 16:04

## 2022-12-15 RX ADMIN — ACETAMINOPHEN 650 MG: 325 TABLET ORAL at 22:46

## 2022-12-15 RX ADMIN — ASPIRIN 81 MG CHEWABLE TABLET 324 MG: 81 TABLET CHEWABLE at 11:43

## 2022-12-15 ASSESSMENT — PAIN DESCRIPTION - PAIN TYPE
TYPE: ACUTE PAIN

## 2022-12-15 ASSESSMENT — PAIN SCALES - GENERAL
PAINLEVEL_OUTOF10: 5
PAINLEVEL_OUTOF10: 7
PAINLEVEL_OUTOF10: 5
PAINLEVEL_OUTOF10: 7
PAINLEVEL_OUTOF10: 8

## 2022-12-15 ASSESSMENT — PAIN DESCRIPTION - LOCATION
LOCATION: CHEST
LOCATION: CHEST
LOCATION: ABDOMEN
LOCATION: HEAD
LOCATION: HEAD

## 2022-12-15 ASSESSMENT — PAIN DESCRIPTION - DESCRIPTORS
DESCRIPTORS: DULL
DESCRIPTORS: DULL;ACHING
DESCRIPTORS: ACHING
DESCRIPTORS: ACHING

## 2022-12-15 ASSESSMENT — PAIN DESCRIPTION - FREQUENCY
FREQUENCY: CONTINUOUS

## 2022-12-15 ASSESSMENT — PAIN DESCRIPTION - ONSET
ONSET: ON-GOING

## 2022-12-15 ASSESSMENT — PAIN DESCRIPTION - ORIENTATION
ORIENTATION: ANTERIOR
ORIENTATION: ANTERIOR
ORIENTATION: MID
ORIENTATION: LEFT

## 2022-12-15 ASSESSMENT — PAIN - FUNCTIONAL ASSESSMENT
PAIN_FUNCTIONAL_ASSESSMENT: 0-10
PAIN_FUNCTIONAL_ASSESSMENT: ACTIVITIES ARE NOT PREVENTED
PAIN_FUNCTIONAL_ASSESSMENT: ACTIVITIES ARE NOT PREVENTED

## 2022-12-15 NOTE — LETTER
[facility]  [address] [address]  Phone: [phone]    No name on file. December 18, 2022     Patient: Tanvi Contreras YOB: 1985   Date of Visit: 12/15/2022       To Whom It May Concern:    Patient was admitted and treated from December 15 2022, to December 17, 2022    If you have any questions or concerns, please don't hesitate to call.     Sincerely,        Kalpesh Walsh RN

## 2022-12-15 NOTE — ED TRIAGE NOTES
Arrived ambulatory to room 7-1 for triage. Tolerated without difficulty. Bed in lowest position. Call light given. Gowned for exam, placed on cardiac monitor and EKG obtained.

## 2022-12-15 NOTE — ED NOTES
Orthostatic vitals done    Lying:   HR 44  /71    Sitting   HR 60  /81   Pt complains of dizziness     Standing HR 71  BP  111/85 Pt complains of dizziness     Zoë Kyle RN  12/15/22 1123

## 2022-12-15 NOTE — ED PROVIDER NOTES
Emergency [facility] EMERGENCY DEPARTMENT    Patient: Tahmina Fischer. MRN: [id number]  : 1985  Date of Evaluation: 12/15/2022  ED Provider: Betsy Mckeon MD    Chief Complaint       Chief Complaint   Patient presents with    Chest Pain     C/o left sided chest pain that radiates to the back, shoulder and neck that began around 0700, increasingly worsening. Patient c/o headache and inability \"to get a full breath. \"      Luis Carlos Mccullough is a 30 y.o. male who presents to the emergency department for evaluation of chest pain. Patient reports been usual state of health until approximately 6:00 this morning. Developed left-sided chest pain which radiates up towards his back and his neck. Was associated with shortness of breath. He says he knows that he is getting of a full breath but he has a sensation of air hunger. Never had similar symptoms of this in the past.  He said when the pain occurs he has a sensation that he feels as though he is going to pass out and developed tunnel vision. Never had similar symptoms of this in the past.  Patient denies family history of cardiac disease younger than the age of 36. He is not a smoker. Denies any recent trauma noted swelling of legs or feet no history of DVTs or PEs. Patient says the symptoms are waxing and waning. He can think of anything that makes the symptoms occur or not. They do occur and resolve spontaneously. ROS:     At least 10 systems reviewed and otherwise acutely negative except as in the [address]     Past History     Past Medical History:   Diagnosis Date    Arachnoid cyst     Arthritis     Chiari malformation type I ([address]     Depression     IBS (irritable bowel syndrome)     Migraines     Seizures (HCC)      Past Surgical History:   Procedure Laterality Date    APPENDECTOMY      NASAL SEPTUM SURGERY       Social History     Socioeconomic History    Marital status:      Spouse name: Crystal    Number of children: 6    Years of education: None    Highest education level: None   Tobacco Use    Smoking status: Former     Packs/day: 0.50     Years: 5.00     Pack years: 2.50     Types: Cigarettes     Quit date: 12/10/2020     Years since quittin.0    Smokeless tobacco: Never   Vaping Use    Vaping Use: Every day   Substance and Sexual Activity    Alcohol use: Yes     Comment: socially    Drug use: Not Currently     Types: Marijuana Kenard Snooks)    Sexual activity: Yes     Partners: Female       Medications/Allergies     Previous Medications    ALBUTEROL SULFATE HFA (VENTOLIN HFA) 108 (90 BASE) MCG/ACT INHALER    Inhale 2 puffs into the lungs 4 times daily as needed for Wheezing    DESVENLAFAXINE SUCCINATE (PRISTIQ) 50 MG TB24 EXTENDED RELEASE TABLET    Take 2 tablets by mouth daily Disregard previous prescription for 100 mg    DICYCLOMINE (BENTYL) 20 MG TABLET    Take 1 tablet by mouth 3 times daily as needed (abdominal pain)    LEVETIRACETAM (KEPPRA) 500 MG TABLET    Take 1 tablet by mouth 2 times daily     Allergies   Allergen Reactions    Ambien [Zolpidem Tartrate] Other (See Comments)     hallucinations    Wellbutrin [Bupropion] Hives    Codeine Nausea And Vomiting        Physical Exam       ED Triage Vitals   BP Temp Temp src Heart Rate Resp SpO2 Height Weight   12/15/22 1030 -- -- 12/15/22 1030 12/15/22 1030 12/15/22 1030 12/15/22 1017 12/15/22 1017   128/84   61 19 98 % 5' 11\" (1.803 m) 175 lb (79.4 kg)     GENERAL APPEARANCE: Awake and alert. Cooperative. No acute distress. HEAD: Normocephalic. Atraumatic. EYES: Sclera anicteric. Pupils equal round reactive to light extraocular movements are intact  ENT: Tolerates saliva. No trismus. Moist mucous membranes  NECK: Supple. Trachea midline. No meningismus  CARDIO: bradycardic rate. Radial pulse 2+. No murmurs rubs or gallops appreciated  LUNGS: Respirations unlabored. CTAB. No accessory muscle usage noted.   No wheezes rales rhonchi or stridor. ABDOMEN: Soft. Non-distended. Non-tender. No tenderness in right upper quadrant or right lower quadrant to deep palpation  EXTREMITIES: No acute deformities. No unilateral leg swelling or tenderness behind either one of calves  SKIN: Warm and dry. No erythema edema or rashes appreciated  NEUROLOGICAL:  Cranial nerves II through XII grossly intact. No gross facial drooping. Moves all 4 extremities spontaneously. PSYCHIATRIC: Normal mood. Alert and oriented x3. No reported active suicidality or homicidality.     Diagnostics   Labs:  Results for orders placed or performed during the hospital encounter of 12/15/22   CBC with Auto Differential   Result Value Ref Range    WBC 9.3 4.0 - 10.5 K/CU MM    RBC 4.89 4.6 - 6.2 M/CU MM    Hemoglobin 14.6 13.5 - 18.0 GM/DL    Hematocrit 42.1 42 - 52 %    MCV 86.1 78 - 100 FL    MCH 29.9 27 - 31 PG    MCHC 34.7 32.0 - 36.0 %    RDW 12.7 11.7 - 14.9 %    Platelets 310 166 - 045 K/CU MM    MPV 11.2 (H) 7.5 - 11.1 FL    Differential Type AUTOMATED DIFFERENTIAL     Segs Relative 49.4 36 - 66 %    Lymphocytes % 39.1 24 - 44 %    Monocytes % 8.3 (H) 0 - 4 %    Eosinophils % 2.4 0 - 3 %    Basophils % 0.6 0 - 1 %    Segs Absolute 4.6 K/CU MM    Lymphocytes Absolute 3.6 K/CU MM    Monocytes Absolute 0.8 K/CU MM    Eosinophils Absolute 0.2 K/CU MM    Basophils Absolute 0.1 K/CU MM    Immature Neutrophil % 0.2 0 - 0.43 %    Total Immature Neutrophil 0.02 K/CU MM   CMP   Result Value Ref Range    Sodium 138 135 - 145 MMOL/L    Potassium 3.9 3.5 - 5.1 MMOL/L    Chloride 104 99 - 110 mMol/L    CO2 20 (L) 21 - 32 MMOL/L    BUN 12 6 - 23 MG/DL    Creatinine 0.6 (L) 0.9 - 1.3 MG/DL    Est, Glom Filt Rate >60 >60 mL/min/1.73m2    Glucose 118 (H) 70 - 99 MG/DL    Calcium 9.0 8.3 - 10.6 MG/DL    Albumin 4.2 3.4 - 5.0 GM/DL    Total Protein 6.6 6.4 - 8.2 GM/DL    Total Bilirubin 0.2 0.0 - 1.0 MG/DL    ALT 13 10 - 40 U/L    AST 17 15 - 37 IU/L    Alkaline Phosphatase 84 40 - 129 IU/L Anion Gap 14 4 - 16   Magnesium   Result Value Ref Range    Magnesium 2.0 1.8 - 2.4 mg/dl   Urinalysis   Result Value Ref Range    Color, UA YELLOW YELLOW    Clarity, UA CLEAR CLEAR    Glucose, Urine NEGATIVE NEGATIVE MG/DL    Bilirubin Urine NEGATIVE NEGATIVE MG/DL    Ketones, Urine NEGATIVE NEGATIVE MG/DL    Specific Gravity, UA 1.020 1.001 - 1.035    Blood, Urine NEGATIVE NEGATIVE    pH, Urine 6.5 5.0 - 8.0    Protein, UA NEGATIVE NEGATIVE MG/DL    Urobilinogen, Urine 0.2 0.2 - 1.0 MG/DL    Nitrite Urine, Quantitative NEGATIVE NEGATIVE    Leukocyte Esterase, Urine NEGATIVE NEGATIVE    Urinalysis Comments       Microscopic exam not performed based on chemical results unless requested in original order. Troponin   Result Value Ref Range    Troponin T <0.010 <0.01 NG/ML   EKG 12 Lead   Result Value Ref Range    Ventricular Rate 60 BPM    Atrial Rate 60 BPM    P-R Interval 140 ms    QRS Duration 92 ms    Q-T Interval 368 ms    QTc Calculation (Bazett) 368 ms    P Axis 58 degrees    R Axis 18 degrees    T Axis 49 degrees    Diagnosis       Normal sinus rhythm with sinus arrhythmia  ST elevation, probably due to early repolarization  Borderline ECG  When compared with ECG of 14-DEC-2020 06:04,  No significant change was found       Radiographs:  XR CHEST (2 VW)    Result Date: 12/15/2022  EXAMINATION: TWO XRAY VIEWS OF THE CHEST 12/15/2022 11:23 am COMPARISON: 12/13/2020 HISTORY: ORDERING SYSTEM PROVIDED HISTORY: chest pain TECHNOLOGIST PROVIDED HISTORY: Reason for exam:->chest pain FINDINGS: Central bronchial pulmonary marking prominence in the hilar and perihilar regions consistent with central bronchitis. No pulmonary consolidations, pleural effusions, pneumothorax, pulmonary edema/vascular congestion, cardiomegaly or mediastinal widening. In lateral projection, there is circumferential soft tissue density about the cardiac silhouette which is subjectively worse compared to 12/13/2020.   Small pericardial effusion process. Delacruz Colon Upper Abdomen: No acute abnormality Soft Tissues/Bones: No acute bone or soft tissue abnormality. No CT angiographic evidence of pulmonary embolism or definite acute thoracic pathology. Foci of subsegmental atelectasis in the subpleural regions of lower lobes bilaterally similar to 02/17/2021 suggesting chronic process. Otherwise, no CT evidence of acute thoracic pathology. No definite CT etiology for patient's clinical complaint of chest pain suggested on this study RECOMMENDATIONS: Unavailable      Procedures/EKG:   Patient's EKG is interpreted by me sinus rhythm rate 60   no ST elevation no ST depression I appreciate no acute ischemia or infarction on this EKG essentially unchanged compared to patient's previous EKG from December 15, 2020    ED Course and MDM   In brief, Pola Schultz is a 30 y.o. male who presented to the emergency department for evaluation of left-sided chest pain rating to his back and near syncope. Based on patient's history and physical would be concerned about possible dysrhythmia of the possibility patient symptoms do include intra-abdominal processes that she has biliary disease gastroesophageal reflux disease or pancreatitis. No evidence of trauma noted on examination. Low clinical suspicion for pulmonary embolus    I did review patient's imaging studies and laboratory work as noted above. On reevaluation patient is resting comfortably. He still is symptomatic when his heart rate drops down into the upper 30s and low 40s and then resolves when his heart rate gets up into the 50s and 60s. Intermittent chest pain still despite aspirin. Reviewed the findings with patient and family at bedside. Recommend discussing case with cardiology for possible admission and continuation of care and treatment he is in agreement with this plan    I did discuss the case with cardiology Dr. Jakob Nelson.   Reviewed imaging studies laboratory work and patient's clinical presentation with him. He feels the patient would benefit from a stress test and possible cardiac catheterization recommend the patient be transferred to [place] [id number] for continuation of care and treatment. I discussed case with hospital service. Dr. Shraddha Boyle has accepted the patient for transfer to that facility for continuation of care and treatment    ED Medication Orders (From admission, onward)      Start Ordered     Status Ordering Provider    12/15/22 1345 12/15/22 1343  0.9 % sodium chloride bolus  ONCE         Acknowledged SOMMER WU    12/15/22 1204 12/15/22 1204  iopamidol (ISOVUE-370) 76 % injection 100 mL  IMG ONCE PRN         Last MAR action: Given - by Isadora Lin on 12/15/22 at [address] [facility] [place], [address]    12/15/22 1115 12/15/22 1107  aspirin chewable tablet 324 mg  ONCE         Last MAR action: Given - by Christie Haskins on 12/15/22 at [facility]            Final Impression      1. Bradycardia    2. Near syncope    3.  Chest pain, unspecified type      DISPOSITION Decision To Transfer 12/15/2022 01:51:54 PM         (Please note that portions of this note may have been completed with a voice recognition program. Efforts were made to edit the dictations but occasionally words are mis-transcribed.)    Jeanette Medrano MD  [address] MD  12/15/22 [address], [place]  12/15/22 0533

## 2022-12-16 ENCOUNTER — APPOINTMENT (OUTPATIENT)
Dept: NUCLEAR MEDICINE | Age: 37
End: 2022-12-16
Attending: STUDENT IN AN ORGANIZED HEALTH CARE EDUCATION/TRAINING PROGRAM
Payer: COMMERCIAL

## 2022-12-16 LAB
AMPHETAMINES: NEGATIVE
BARBITURATE SCREEN URINE: NEGATIVE
BENZODIAZEPINE SCREEN, URINE: NEGATIVE
CANNABINOID SCREEN URINE: ABNORMAL
CHOLESTEROL: 138 MG/DL
COCAINE METABOLITE: NEGATIVE
HDLC SERPL-MCNC: 58 MG/DL
LDL CHOLESTEROL CALCULATED: 62 MG/DL
OPIATES, URINE: NEGATIVE
OXYCODONE: NEGATIVE
PHENCYCLIDINE, URINE: NEGATIVE
RAPID INFLUENZA  B AGN: NEGATIVE
RAPID INFLUENZA A AGN: NEGATIVE
T4 FREE: 1.16 NG/DL (ref 0.9–1.8)
TRIGL SERPL-MCNC: 90 MG/DL
TROPONIN T: <0.01 NG/ML
TSH HIGH SENSITIVITY: 0.4 UIU/ML (ref 0.27–4.2)

## 2022-12-16 PROCEDURE — 80307 DRUG TEST PRSMV CHEM ANLYZR: CPT

## 2022-12-16 PROCEDURE — 84439 ASSAY OF FREE THYROXINE: CPT

## 2022-12-16 PROCEDURE — 84484 ASSAY OF TROPONIN QUANT: CPT

## 2022-12-16 PROCEDURE — APPNB60 APP NON BILLABLE TIME 46-60 MINS: Performed by: NURSE PRACTITIONER

## 2022-12-16 PROCEDURE — 6370000000 HC RX 637 (ALT 250 FOR IP): Performed by: INTERNAL MEDICINE

## 2022-12-16 PROCEDURE — 84443 ASSAY THYROID STIM HORMONE: CPT

## 2022-12-16 PROCEDURE — 2580000003 HC RX 258: Performed by: INTERNAL MEDICINE

## 2022-12-16 PROCEDURE — 93017 CV STRESS TEST TRACING ONLY: CPT

## 2022-12-16 PROCEDURE — 3430000000 HC RX DIAGNOSTIC RADIOPHARMACEUTICAL: Performed by: NURSE PRACTITIONER

## 2022-12-16 PROCEDURE — 99222 1ST HOSP IP/OBS MODERATE 55: CPT | Performed by: INTERNAL MEDICINE

## 2022-12-16 PROCEDURE — 6360000002 HC RX W HCPCS: Performed by: INTERNAL MEDICINE

## 2022-12-16 PROCEDURE — 93306 TTE W/DOPPLER COMPLETE: CPT

## 2022-12-16 PROCEDURE — 80061 LIPID PANEL: CPT

## 2022-12-16 PROCEDURE — 87804 INFLUENZA ASSAY W/OPTIC: CPT

## 2022-12-16 PROCEDURE — 6360000002 HC RX W HCPCS: Performed by: FAMILY MEDICINE

## 2022-12-16 PROCEDURE — A9500 TC99M SESTAMIBI: HCPCS | Performed by: NURSE PRACTITIONER

## 2022-12-16 PROCEDURE — 2140000000 HC CCU INTERMEDIATE R&B

## 2022-12-16 PROCEDURE — 78452 HT MUSCLE IMAGE SPECT MULT: CPT

## 2022-12-16 PROCEDURE — 36415 COLL VENOUS BLD VENIPUNCTURE: CPT

## 2022-12-16 RX ORDER — TECHNETIUM TC-99M SESTAMIBI 1 MG/10ML
10 INJECTION INTRAVENOUS
Status: COMPLETED | OUTPATIENT
Start: 2022-12-16 | End: 2022-12-16

## 2022-12-16 RX ORDER — TECHNETIUM TC-99M SESTAMIBI 1 MG/10ML
30 INJECTION INTRAVENOUS
Status: COMPLETED | OUTPATIENT
Start: 2022-12-16 | End: 2022-12-16

## 2022-12-16 RX ORDER — KETOROLAC TROMETHAMINE 30 MG/ML
15 INJECTION, SOLUTION INTRAMUSCULAR; INTRAVENOUS ONCE
Status: COMPLETED | OUTPATIENT
Start: 2022-12-16 | End: 2022-12-16

## 2022-12-16 RX ADMIN — KIT FOR THE PREPARATION OF TECHNETIUM TC99M SESTAMIBI 30 MILLICURIE: 1 INJECTION, POWDER, LYOPHILIZED, FOR SOLUTION PARENTERAL at 16:44

## 2022-12-16 RX ADMIN — SODIUM CHLORIDE, PRESERVATIVE FREE 10 ML: 5 INJECTION INTRAVENOUS at 21:31

## 2022-12-16 RX ADMIN — SODIUM CHLORIDE, PRESERVATIVE FREE 10 ML: 5 INJECTION INTRAVENOUS at 08:50

## 2022-12-16 RX ADMIN — ASPIRIN 81 MG CHEWABLE TABLET 81 MG: 81 TABLET CHEWABLE at 08:49

## 2022-12-16 RX ADMIN — ATORVASTATIN CALCIUM 40 MG: 40 TABLET, FILM COATED ORAL at 21:30

## 2022-12-16 RX ADMIN — LEVETIRACETAM 500 MG: 500 TABLET, FILM COATED ORAL at 21:30

## 2022-12-16 RX ADMIN — DICYCLOMINE HYDROCHLORIDE 20 MG: 20 TABLET ORAL at 06:57

## 2022-12-16 RX ADMIN — KIT FOR THE PREPARATION OF TECHNETIUM TC99M SESTAMIBI 10 MILLICURIE: 1 INJECTION, POWDER, LYOPHILIZED, FOR SOLUTION PARENTERAL at 16:45

## 2022-12-16 RX ADMIN — ENOXAPARIN SODIUM 40 MG: 100 INJECTION SUBCUTANEOUS at 08:50

## 2022-12-16 RX ADMIN — KETOROLAC TROMETHAMINE 15 MG: 30 INJECTION, SOLUTION INTRAMUSCULAR; INTRAVENOUS at 00:40

## 2022-12-16 RX ADMIN — LEVETIRACETAM 500 MG: 500 TABLET, FILM COATED ORAL at 08:49

## 2022-12-16 RX ADMIN — DICYCLOMINE HYDROCHLORIDE 20 MG: 20 TABLET ORAL at 15:09

## 2022-12-16 RX ADMIN — DESVENLAFAXINE SUCCINATE 100 MG: 50 TABLET, EXTENDED RELEASE ORAL at 08:49

## 2022-12-16 ASSESSMENT — PAIN SCALES - GENERAL
PAINLEVEL_OUTOF10: 0
PAINLEVEL_OUTOF10: 4
PAINLEVEL_OUTOF10: 8

## 2022-12-16 ASSESSMENT — PAIN DESCRIPTION - DESCRIPTORS
DESCRIPTORS: ACHING
DESCRIPTORS: ACHING;STABBING

## 2022-12-16 ASSESSMENT — PAIN - FUNCTIONAL ASSESSMENT: PAIN_FUNCTIONAL_ASSESSMENT: ACTIVITIES ARE NOT PREVENTED

## 2022-12-16 ASSESSMENT — PAIN DESCRIPTION - ONSET: ONSET: ON-GOING

## 2022-12-16 ASSESSMENT — PAIN DESCRIPTION - FREQUENCY: FREQUENCY: CONTINUOUS

## 2022-12-16 ASSESSMENT — PAIN DESCRIPTION - LOCATION
LOCATION: HEAD
LOCATION: ABDOMEN

## 2022-12-16 ASSESSMENT — PAIN DESCRIPTION - PAIN TYPE: TYPE: ACUTE PAIN

## 2022-12-16 ASSESSMENT — PAIN DESCRIPTION - ORIENTATION: ORIENTATION: ANTERIOR

## 2022-12-16 ASSESSMENT — ENCOUNTER SYMPTOMS
EYES NEGATIVE: 1
RESPIRATORY NEGATIVE: 1
GASTROINTESTINAL NEGATIVE: 1

## 2022-12-16 NOTE — CONSULTS
Electrophysiology Consult Note      Reason for consultation: near syncope    Chief complaint :  Dizziness  / symptomatic bradycardia    Referring physician: Treasure Fritz      Primary care physician: Miriam Guadalupe MD      History of Present Illness:     Maurizio Mi is a 70-year-old male with a history of Chiari malformation type I, migraines, and seizures presents with complaints of chest pain, headache, and dizziness. He reports that the symptoms started this morning the day of admission he states that he was sitting down when he felt sudden onset of chest pain. He states that it radiated to his left shoulder and neck and he became dizzy and felt like he was going to pass out. He states that he was nauseated short of breath and diaphoretic. The symptoms lasted for several hours. Patient appeared to be confused per report of significant other. Patient came to the emergency room for further . Patient was found to be bradycardic with a heart rate of 40-59 in the [facility] ED. Heart rate did improve however noted early morning for heart rate to be in the 30s and 40s  Patient had a stress test which showed good chronotropic response. Electrophysiology was consulted for presyncope and bradycardia. On admission sodium 138, potassium 3.9, creatinine 0.6, magnesium 2.0 troponin less than 0.010x3  EF 50 to 55%  EKG initially revealed sinus bradycardia       Pastmedical history:   Past Medical History:   Diagnosis Date    Arachnoid cyst     Arthritis     Chiari malformation type I (HCC)     Depression     IBS (irritable bowel syndrome)     Migraines     Seizures (HCC)        Surgical history :   Past Surgical History:   Procedure Laterality Date    APPENDECTOMY      NASAL SEPTUM SURGERY         Family history: No sudden cardiac death      Social history :  reports that he quit smoking about 2 years ago. His smoking use included cigarettes.  He has a 2.50 pack-year smoking history. He has never used smokeless tobacco. He reports current alcohol use. He reports that he does not currently use drugs after having used the following drugs: Marijuana Seabron Barban). Allergies   Allergen Reactions    Ambien [Zolpidem Tartrate] Other (See Comments)     hallucinations    Wellbutrin [Bupropion] Hives    Codeine Nausea And Vomiting       No current facility-administered medications on file prior to encounter. Current Outpatient Medications on File Prior to Encounter   Medication Sig Dispense Refill    dicyclomine (BENTYL) 20 MG tablet Take 1 tablet by mouth 3 times daily as needed (abdominal pain) 90 tablet 0    desvenlafaxine succinate (PRISTIQ) 50 MG TB24 extended release tablet Take 2 tablets by mouth daily Disregard previous prescription for 100 mg (Patient taking differently: Take 100 mg by mouth daily) 90 tablet 0    albuterol sulfate HFA (VENTOLIN HFA) 108 (90 Base) MCG/ACT inhaler Inhale 2 puffs into the lungs 4 times daily as needed for Wheezing (Patient not taking: Reported on 5/13/2022) 18 g 0    levETIRAcetam (KEPPRA) 500 MG tablet Take 1 tablet by mouth 2 times daily (Patient not taking: Reported on 12/15/2022) 60 tablet 3       Review of Systems:   Review of Systems   Constitutional:  Positive for diaphoresis. Negative for activity change, chills, fatigue and fever. HENT:  Negative for congestion, ear pain and tinnitus. Eyes:  Negative for photophobia, pain and visual disturbance. Respiratory:  Negative for cough, chest tightness, shortness of breath and wheezing. Cardiovascular:  Positive for chest pain. Negative for palpitations and leg swelling. Gastrointestinal:  Positive for nausea. Negative for abdominal pain, blood in stool, constipation, diarrhea and vomiting. Endocrine: Negative for cold intolerance and heat intolerance. Genitourinary:  Negative for dysuria, flank pain and hematuria.    Musculoskeletal:  Negative for arthralgias, back pain, myalgias and neck stiffness. Skin:  Negative for color change and rash. Allergic/Immunologic: Negative for food allergies. Neurological:  Positive for dizziness, light-headedness and headaches. Negative for numbness. Hematological:  Does not bruise/bleed easily. Psychiatric/Behavioral:  Positive for confusion. Negative for agitation and behavioral problems. Examination:      Vitals:    12/16/22 0000 12/16/22 0409 12/16/22 0500 12/16/22 0854   BP: 122/68 (!) 97/54 (!) 119/91    Pulse: 72 (!) 45 (!) 47 (!) 39   Resp: 20 15 16    Temp: 97.6 °F (36.4 °C)      TempSrc: Oral      SpO2: 97%           There is no height or weight on file to calculate BMI. Physical Exam  Constitutional:       General: He is not in acute distress. Appearance: He is not ill-appearing or diaphoretic. HENT:      Head: Normocephalic and atraumatic. Right Ear: External ear normal.      Left Ear: External ear normal.      Nose: No congestion. Mouth/Throat:      Mouth: Mucous membranes are moist.   Eyes:      Extraocular Movements: Extraocular movements intact. Conjunctiva/sclera: Conjunctivae normal.      Pupils: Pupils are equal, round, and reactive to light. Cardiovascular:      Rate and Rhythm: Regular rhythm. Bradycardia present. Heart sounds: No murmur heard. Pulmonary:      Effort: Pulmonary effort is normal. No respiratory distress. Breath sounds: Normal breath sounds. No wheezing or rhonchi. Abdominal:      General: Abdomen is flat. Bowel sounds are normal. There is no distension. Palpations: Abdomen is soft. Tenderness: There is no abdominal tenderness. Musculoskeletal:         General: No tenderness. Cervical back: Normal range of motion. No tenderness. Right lower leg: No edema. Left lower leg: No edema. Skin:     General: Skin is warm. Neurological:      General: No focal deficit present. Mental Status: He is alert and oriented to person, place, and time. Cranial Nerves: No cranial nerve deficit. Psychiatric:         Mood and Affect: Mood normal.             CBC:   Lab Results   Component Value Date/Time    WBC 9.3 12/15/2022 10:25 AM    HGB 14.6 12/15/2022 10:25 AM    HCT 42.1 12/15/2022 10:25 AM     12/15/2022 10:25 AM     Lipids:  Lab Results   Component Value Date    CHOL 138 12/16/2022    TRIG 90 12/16/2022    HDL 58 12/16/2022    LDLCALC 62 12/16/2022     PT/INR:   Lab Results   Component Value Date/Time    INR 0.95 08/11/2014 06:35 AM        BMP:    Lab Results   Component Value Date     12/15/2022    K 3.9 12/15/2022     12/15/2022    CO2 20 (L) 12/15/2022    BUN 12 12/15/2022     CMP:   Lab Results   Component Value Date    AST 17 12/15/2022    PROT 6.6 12/15/2022    BILITOT 0.2 12/15/2022    ALKPHOS 84 12/15/2022     TSH:  No results found for: TSH, T4    EKGINTERPRETATION - EKG Interpretation:        Vitals:    12/16/22 0000 12/16/22 0409 12/16/22 0500 12/16/22 0854   BP: 122/68 (!) 97/54 (!) 119/91    Pulse: 72 (!) 45 (!) 47 (!) 39   Resp: 20 15 16    Temp: 97.6 °F (36.4 °C)      TempSrc: Oral      SpO2: 97%             IMPRESSION / RECOMMENDATIONS:     Dizziness and presyncopal  Bradycardia  Nausea and fatigue - etiology unclear  History of anxiety and depression   History of seizure     Stress test reviewed good chronotropic response  Post stress he had bradycardia appears more like vagal response  Reports this is all new  No recent history of fever or chills, no tick bites  No AV block     Patient probably has vasovagal symptoms  Hydration  Compression stockings to be worn during the day when walking     Conservative measures for now and reassess. Thanks again for allowing me to participate in care of this patient. Please call me if you have any questions. With best regards.       Christopher oMrel MD, 12/16/2022 4:03 PM     Please note this report has been partially produced using speech recognition software and may contain errors related to that system including errors in grammar, punctuation, and spelling, as well as words and phrases that may be inappropriate. If there are any questions or concerns please feel free to contact the dictating provider for clarification.

## 2022-12-16 NOTE — H&P
History and Physical      Name:  Alex Paz /Age/Sex: 1985  (30 y.o. male)   MRN & CSN:  [id number] & [id number] Encounter Date/Time: 12/15/2022 7:12 PM EST   Location:  [address] [place] [address]-A PCP: Génesis Mosley MD       Hospital Day: 1    Assessment and Plan:     #. Bradycardia:  -pt's HR was 40-59 at [facility] ED. Currently HR 68-72- pt sitting upright in bed.  -pt had dizziness, chest discomfort in the morning  -monitor on tele  -consult cardiology    #. Chest discomfort  -currently resolved  -Troponin x 1 negative, EKG with no acute ST-T wave changes  -CTA chest- no acute process.  -serial troponin, repeat EKG. -consult cardiology    #. Seizure disorder  -continue Keppra  -seizure precautions    #. Depression- on desvenlafaxine    #. IBS- on dicyclomine    #. Chronic tobacco dependence  -pt admits to vaping    Disposition:   Current Living situation: home    Diet ADULT DIET; Regular; No Caffeine  Diet NPO   DVT Prophylaxis [x] Lovenox, []  Heparin, [] SCDs, [] Ambulation,  [] Eliquis, [] Xarelto   Code Status Full Code   Surrogate Decision Maker/ POA      History from:   EMR, patient. History of Present Illness:     Chief Complaint: Symptomatic bradycardia  Alex Paz is a 30 y.o. male with seizure disorder, depression, IBS, chronic tobacco dependence presented to [facility] ED with c/o chest discomfort, headache, dizziness since today morning. Pt reported that he was sitting in his chair, when he felt chest discomfort, radiating to his left shoulder, neck, felt dizzy, thought he was going to pass to, felt nauseous, short of breath, diaphoretic. Symptoms persisted for few hrs. As per significant other pt called her, but appeared confused, was not making any sense. Pt denied any fever, chills, cough, denied any abdominal pain, denied any LE swelling. At presentation pt had /84, HR 61, RR 19, saturating 98% on RA. Labs were within normal range. CTA chest no acute process.   Pt received aspirin 324, dicyclomine, NS bolus in ER. Cardiology was consulted. Review of Systems: Need 10 Elements   10 point review of systems conducted and pertinent positives and negatives as per HPI. Objective:   No intake or output data in the 24 hours ending 12/15/22 1912   Vitals:   Vitals:    12/15/22 1833   BP: 113/75   Pulse: 57   Resp: 16   Temp: 97.8 °F (36.6 °C)   TempSrc: Oral   SpO2: 96%       Medications Prior to Admission   Reviewed medications with patient    Prior to Admission medications    Medication Sig Start Date End Date Taking? Authorizing Provider   dicyclomine (BENTYL) 20 MG tablet Take 1 tablet by mouth 3 times daily as needed (abdominal pain) 11/21/22   Ruth Lee PA-C   desvenlafaxine succinate (PRISTIQ) 50 MG TB24 extended release tablet Take 2 tablets by mouth daily Disregard previous prescription for 100 mg  Patient taking differently: Take 100 mg by mouth daily 6/10/22   NUBIA Wesley - NP   albuterol sulfate HFA (VENTOLIN HFA) 108 (90 Base) MCG/ACT inhaler Inhale 2 puffs into the lungs 4 times daily as needed for Wheezing  Patient not taking: Reported on 5/13/2022 1/17/22   Livia Lake,    levETIRAcetam (KEPPRA) 500 MG tablet Take 1 tablet by mouth 2 times daily  Patient not taking: Reported on 12/15/2022 6/17/20   Karen Wallace MD       Physical Exam: Need 8 Elements   Physical Exam     GEN  -Awake, alert, NAD.   EYES   -PERRL. HENT  -MM are moist.   RESP  -LS CTA equal bilat, no wheezes, rales or rhonchi. Symmetric chest movement. No respiratory distress noted. C/V  -S1/S2 auscultated. RRR without appreciable M/R/G. No JVD or carotid bruits. Peripheral pulses equal bilaterally and palpable. No peripheral edema. No reproducible chest wall tenderness. GI  -Abdomen is soft, non-distended, no significant tenderness. No masses or guarding. + BS in all quadrants. Rectal exam deferred.   -No CVA tenderness. Ambrosio catheter is not present.   MS  -B/L extremities - No gross joint deformities. No swelling, intact sensation symmetrical.   SKIN  -Normal coloration, warm, dry. NEURO  - Awake, alert, oriented x 3, no focal deficits  PSYC  - Appropriate affect. Past Medical History:   Reviewed patient's past medical, surgical, social, family history and allergies. PMHx   Past Medical History:   Diagnosis Date    Arachnoid cyst     Arthritis     Chiari malformation type I ([address]     Depression     IBS (irritable bowel syndrome)     Migraines     Seizures (HCC)      PSHX:  has a past surgical history that includes Appendectomy and Nasal septum surgery. Allergies: Allergies   Allergen Reactions    Ambien [Zolpidem Tartrate] Other (See Comments)     hallucinations    Wellbutrin [Bupropion] Hives    Codeine Nausea And Vomiting     Fam HX: family history is not on file.   Soc HX:   Social History     Socioeconomic History    Marital status:      Spouse name: Philly    Number of children: 6   Tobacco Use    Smoking status: Former     Packs/day: 0.50     Years: 5.00     Pack years: 2.50     Types: Cigarettes     Quit date: 12/10/2020     Years since quittin.0    Smokeless tobacco: Never   Vaping Use    Vaping Use: Every day   Substance and Sexual Activity    Alcohol use: Yes     Comment: socially    Drug use: Not Currently     Types: Marijuana [place])    Sexual activity: Yes     Partners: Female       Medications:   Medications:    sodium chloride flush  5-40 mL IntraVENous 2 times per day    [START ON 2022] aspirin  81 mg Oral Daily    atorvastatin  40 mg Oral Nightly    enoxaparin  40 mg SubCUTAneous Daily      Infusions:    sodium chloride       PRN Meds: sodium chloride flush, 5-40 mL, PRN  sodium chloride, , PRN  ondansetron, 4 mg, Q8H PRN   Or  ondansetron, 4 mg, Q6H PRN  acetaminophen, 650 mg, Q6H PRN   Or  acetaminophen, 650 mg, Q6H PRN  polyethylene glycol, 17 g, Daily PRN        Labs      CBC:   Recent Labs     12/15/22  1025   WBC 9.3   HGB 14.6      BMP:    Recent Labs     12/15/22  1025      K 3.9      CO2 20*   BUN 12   CREATININE 0.6*   GLUCOSE 118*     Hepatic:   Recent Labs     12/15/22  1025   AST 17   ALT 13   BILITOT 0.2   ALKPHOS 84     Lipids:   Lab Results   Component Value Date/Time    CHOL 208 07/19/2021 03:03 PM    HDL 54 07/19/2021 03:03 PM    TRIG 162 07/19/2021 03:03 PM     Hemoglobin A1C: No results found for: LABA1C  TSH: No results found for: TSH  Troponin:   Lab Results   Component Value Date/Time    TROPONINT <0.010 12/15/2022 10:25 AM    TROPONINT <0.010 12/13/2020 06:42 PM    TROPONINT <0.010 12/13/2020 02:20 PM     Lactic Acid: No results for input(s): LACTA in the last 72 hours. BNP: No results for input(s): PROBNP in the last 72 hours. UA:  Lab Results   Component Value Date/Time    NITRU NEGATIVE 12/15/2022 11:15 AM    COLORU YELLOW 12/15/2022 11:15 AM    WBCUA 1 02/17/2021 02:45 PM    RBCUA 1 02/17/2021 02:45 PM    MUCUS RARE 04/24/2017 02:28 PM    BACTERIA NEGATIVE 02/17/2021 02:45 PM    CLARITYU CLEAR 12/15/2022 11:15 AM    SPECGRAV 1.020 12/15/2022 11:15 AM    LEUKOCYTESUR NEGATIVE 12/15/2022 11:15 AM    UROBILINOGEN 0.2 12/15/2022 11:15 AM    BILIRUBINUR NEGATIVE 12/15/2022 11:15 AM    BLOODU NEGATIVE 12/15/2022 11:15 AM    KETUA NEGATIVE 12/15/2022 11:15 AM     Urine Cultures: No results found for: Render Ha  Blood Cultures: No results found for: BC  No results found for: BLOODCULT2  Organism: No results found for: ORG    Imaging/Diagnostics Last 24 Hours   XR CHEST (2 VW)    Result Date: 12/15/2022  EXAMINATION: TWO XRAY VIEWS OF THE CHEST 12/15/2022 11:23 am COMPARISON: 12/13/2020 HISTORY: ORDERING SYSTEM PROVIDED HISTORY: chest pain TECHNOLOGIST PROVIDED HISTORY: Reason for exam:->chest pain FINDINGS: Central bronchial pulmonary marking prominence in the hilar and perihilar regions consistent with central bronchitis.   No pulmonary consolidations, pleural effusions, pneumothorax, pulmonary edema/vascular congestion, cardiomegaly or mediastinal widening. In lateral projection, there is circumferential soft tissue density about the cardiac silhouette which is subjectively worse compared to 12/13/2020. Small pericardial effusion may produce this appearance. IV contrast-enhanced chest CT and/or echocardiography would be helpful for further evaluation. Central bronchial pulmonary marking prominence consistent with central bronchitis. Circumferential soft tissue density about the cardiac silhouette in lateral view. Pericardial effusion should be considered. IV contrast-enhanced chest CT or echocardiogram would be helpful for further evaluation. Otherwise, radiographically nonacute chest.     CTA PULMONARY W CONTRAST    Result Date: 12/15/2022  EXAMINATION: CTA OF THE CHEST 12/15/2022 11:56 am TECHNIQUE: CTA of the chest was performed after the administration of intravenous contrast.  Multiplanar reformatted images are provided for review. MIP images are provided for review. Automated exposure control, iterative reconstruction, and/or weight based adjustment of the mA/kV was utilized to reduce the radiation dose to as low as reasonably achievable. COMPARISON: Lower thoracic images abdomen pelvis CT 02/17/2021 HISTORY: ORDERING SYSTEM PROVIDED HISTORY: chest pain TECHNOLOGIST PROVIDED HISTORY: Reason for exam:->chest pain Decision Support Exception - unselect if not a suspected or confirmed emergency medical condition->Emergency Medical Condition (MA) FINDINGS: Pulmonary Arteries: No evidence of pulmonary embolism to the level of the segmental branches of pulmonary arteries. Subsegmental branches not diagnostically opacified. Mediastinum: No suspicious masses or lymphadenopathy. No acute abnormality visualized aorta, great vessels, subclavian, visualized carotid, visualized vertebral or visualized axillary arteries.  Lungs/pleura: No acute pulmonary consolidations, airspace infiltrates, suspicious masses, pneumothorax, central airway obstruction, acute pleural disease. Foci of subpleural subsegmental atelectasis in the posterior mid-lower lung fields similar to visualized portions of thorax on 02/17/2021 which may reflect chronic process. Isadora Denham Upper Abdomen: No acute abnormality Soft Tissues/Bones: No acute bone or soft tissue abnormality. No CT angiographic evidence of pulmonary embolism or definite acute thoracic pathology. Foci of subsegmental atelectasis in the subpleural regions of lower lobes bilaterally similar to 02/17/2021 suggesting chronic process. Otherwise, no CT evidence of acute thoracic pathology. No definite CT etiology for patient's clinical complaint of chest pain suggested on this study RECOMMENDATIONS: Unavailable       Personally reviewed Lab Studies, Imaging.     Electronically signed by Odin Butler MD on 12/15/2022 at 7:12 PM

## 2022-12-16 NOTE — PROGRESS NOTES
V2.0  [facility] Hospitalist Progress Note      Name:  Yohana Wray. /Age/Sex: 1985  (30 y.o. male)   MRN & CSN:  [id number] & [id number] Encounter Date/Time: 2022 7:03 AM EST    Location:  [address] [place] [address] PCP: Kofi Lin MD       Hospital Day: 2    Assessment and Plan:   Yohana Alvarenga is a 30 y.o. male with pmh of Seizures and chronic tobacco dependence who presents with Symptomatic bradycardia      Plan:  #Bradycardia  #Chest discomfort  --Pt's HR was 40-59 at [facility], improved to 60s-70s on presentation here, HR in the 30s to 40s this AM  --Trops negative x3, CTA negative for an acute process, EKG showed no acute ST changes  --TSH and T4 wnl  --Cards and EP consulted, will appreciate recs  --Patient to undergo stress test today, will follow up on results  --Keep on tele  --Continue ASA and statin     #Seizure disorder  --Reports that his last seizure was 5 days ago. Patient has an aura with his seizures and previously followed with neuro but has not been seen in a while  --continue Keppra  --seizure precautions  --Will have patient follow up with Neuro outpatient     #Depression  --Continue desvenlafaxine     #IBS  --Continue dicyclomine     #Chronic tobacco dependence  --Patient admits to vaping, refusing NRT at this time  --Discussed at length the need to stop vaping, resources offered    Diet Diet NPO   DVT Prophylaxis [x] Lovenox, []  Heparin, [] SCDs, [] Ambulation,  [] Eliquis, [] Xarelto  [] Coumadin   Code Status Full Code   Disposition From: Home  Expected Disposition: Home  Estimated Date of Discharge: 1-2 days  Patient requires continued admission due to CP and symptomatic bradycardia   Surrogate Decision Maker/ POA      Subjective:     Chief Complaint: No chief complaint on file. Patient seen and examined at bedside. He denies any CP at this time. Reports he is doing better than when he came in.       Review of Systems:    Review of Systems   Constitutional: Negative. HENT: Negative. Eyes: Negative. Respiratory: Negative. Cardiovascular:  Positive for chest pain. Gastrointestinal: Negative. Genitourinary: Negative. Musculoskeletal: Negative. Skin: Negative. Neurological:  Positive for seizures. Psychiatric/Behavioral: Negative. Objective:   No intake or output data in the 24 hours ending 12/16/22 0703     Vitals:   Vitals:    12/16/22 0500   BP: (!) 119/91   Pulse: (!) 47   Resp: 16   Temp:    SpO2:        Physical Exam:   General: NAD, lying in bed  Eyes: EOMI  ENT: moist mucous membranes  Cardiovascular: Bradycardic. Respiratory: Clear to auscultation  Gastrointestinal: Soft, non tender  Genitourinary: no suprapubic tenderness  Musculoskeletal: No edema  Skin: warm, dry  Neuro: Alert. Psych: Mood appropriate.      Medications:   Medications:    sodium chloride flush  5-40 mL IntraVENous 2 times per day    aspirin  81 mg Oral Daily    atorvastatin  40 mg Oral Nightly    enoxaparin  40 mg SubCUTAneous Daily    desvenlafaxine succinate  100 mg Oral Daily    dicyclomine  20 mg Oral TID AC    levETIRAcetam  500 mg Oral BID      Infusions:    sodium chloride       PRN Meds: sodium chloride flush, 5-40 mL, PRN  sodium chloride, , PRN  ondansetron, 4 mg, Q8H PRN   Or  ondansetron, 4 mg, Q6H PRN  acetaminophen, 650 mg, Q6H PRN   Or  acetaminophen, 650 mg, Q6H PRN  polyethylene glycol, 17 g, Daily PRN        Labs      Recent Results (from the past 24 hour(s))   EKG 12 Lead    Collection Time: 12/15/22 10:25 AM   Result Value Ref Range    Ventricular Rate 60 BPM    Atrial Rate 60 BPM    P-R Interval 140 ms    QRS Duration 92 ms    Q-T Interval 368 ms    QTc Calculation (Bazett) 368 ms    P Axis 58 degrees    R Axis 18 degrees    T Axis 49 degrees    Diagnosis       Normal sinus rhythm with sinus arrhythmia  ST elevation, probably due to early repolarization  Borderline ECG  When compared with ECG of 14-DEC-2020 06:04,  No significant change was found  Confirmed by ADAIR Ivey ([id number]) on 12/15/2022 5:51:26 PM     CBC with Auto Differential    Collection Time: 12/15/22 10:25 AM   Result Value Ref Range    WBC 9.3 4.0 - 10.5 K/CU MM    RBC 4.89 4.6 - 6.2 M/CU MM    Hemoglobin 14.6 13.5 - 18.0 GM/DL    Hematocrit 42.1 42 - 52 %    MCV 86.1 78 - 100 FL    MCH 29.9 27 - 31 PG    MCHC 34.7 32.0 - 36.0 %    RDW 12.7 11.7 - 14.9 %    Platelets 801 589 - 691 K/CU MM    MPV 11.2 (H) 7.5 - 11.1 FL    Differential Type AUTOMATED DIFFERENTIAL     Segs Relative 49.4 36 - 66 %    Lymphocytes % 39.1 24 - 44 %    Monocytes % 8.3 (H) 0 - 4 %    Eosinophils % 2.4 0 - 3 %    Basophils % 0.6 0 - 1 %    Segs Absolute 4.6 K/CU MM    Lymphocytes Absolute 3.6 K/CU MM    Monocytes Absolute 0.8 K/CU MM    Eosinophils Absolute 0.2 K/CU MM    Basophils Absolute 0.1 K/CU MM    Immature Neutrophil % 0.2 0 - 0.43 %    Total Immature Neutrophil 0.02 K/CU MM   CMP    Collection Time: 12/15/22 10:25 AM   Result Value Ref Range    Sodium 138 135 - 145 MMOL/L    Potassium 3.9 3.5 - 5.1 MMOL/L    Chloride 104 99 - 110 mMol/L    CO2 20 (L) 21 - 32 MMOL/L    BUN 12 6 - 23 MG/DL    Creatinine 0.6 (L) 0.9 - 1.3 MG/DL    Est, Glom Filt Rate >60 >60 mL/min/1.73m2    Glucose 118 (H) 70 - 99 MG/DL    Calcium 9.0 8.3 - 10.6 MG/DL    Albumin 4.2 3.4 - 5.0 GM/DL    Total Protein 6.6 6.4 - 8.2 GM/DL    Total Bilirubin 0.2 0.0 - 1.0 MG/DL    ALT 13 10 - 40 U/L    AST 17 15 - 37 IU/L    Alkaline Phosphatase 84 40 - 129 IU/L    Anion Gap 14 4 - 16   Magnesium    Collection Time: 12/15/22 10:25 AM   Result Value Ref Range    Magnesium 2.0 1.8 - 2.4 mg/dl   Troponin    Collection Time: 12/15/22 10:25 AM   Result Value Ref Range    Troponin T <0.010 <0.01 NG/ML   Urinalysis    Collection Time: 12/15/22 11:15 AM   Result Value Ref Range    Color, UA YELLOW YELLOW    Clarity, UA CLEAR CLEAR    Glucose, Urine NEGATIVE NEGATIVE MG/DL    Bilirubin Urine NEGATIVE NEGATIVE MG/DL    Ketones, Urine NEGATIVE NEGATIVE MG/DL    Specific Gravity, UA 1.020 1.001 - 1.035    Blood, Urine NEGATIVE NEGATIVE    pH, Urine 6.5 5.0 - 8.0    Protein, UA NEGATIVE NEGATIVE MG/DL    Urobilinogen, Urine 0.2 0.2 - 1.0 MG/DL    Nitrite Urine, Quantitative NEGATIVE NEGATIVE    Leukocyte Esterase, Urine NEGATIVE NEGATIVE    Urinalysis Comments       Microscopic exam not performed based on chemical results unless requested in original order. Troponin    Collection Time: 12/15/22 10:24 PM   Result Value Ref Range    Troponin T <0.010 <0.01 NG/ML        Imaging/Diagnostics Last 24 Hours   XR CHEST (2 VW)    Result Date: 12/15/2022  EXAMINATION: TWO XRAY VIEWS OF THE CHEST 12/15/2022 11:23 am COMPARISON: 12/13/2020 HISTORY: ORDERING SYSTEM PROVIDED HISTORY: chest pain TECHNOLOGIST PROVIDED HISTORY: Reason for exam:->chest pain FINDINGS: Central bronchial pulmonary marking prominence in the hilar and perihilar regions consistent with central bronchitis. No pulmonary consolidations, pleural effusions, pneumothorax, pulmonary edema/vascular congestion, cardiomegaly or mediastinal widening. In lateral projection, there is circumferential soft tissue density about the cardiac silhouette which is subjectively worse compared to 12/13/2020. Small pericardial effusion may produce this appearance. IV contrast-enhanced chest CT and/or echocardiography would be helpful for further evaluation. Central bronchial pulmonary marking prominence consistent with central bronchitis. Circumferential soft tissue density about the cardiac silhouette in lateral view. Pericardial effusion should be considered. IV contrast-enhanced chest CT or echocardiogram would be helpful for further evaluation.   Otherwise, radiographically nonacute chest.     CTA PULMONARY W CONTRAST    Result Date: 12/15/2022  EXAMINATION: CTA OF THE CHEST 12/15/2022 11:56 am TECHNIQUE: CTA of the chest was performed after the administration of intravenous contrast.  Multiplanar reformatted images are provided for review. MIP images are provided for review. Automated exposure control, iterative reconstruction, and/or weight based adjustment of the mA/kV was utilized to reduce the radiation dose to as low as reasonably achievable. COMPARISON: Lower thoracic images abdomen pelvis CT 02/17/2021 HISTORY: ORDERING SYSTEM PROVIDED HISTORY: chest pain TECHNOLOGIST PROVIDED HISTORY: Reason for exam:->chest pain Decision Support Exception - unselect if not a suspected or confirmed emergency medical condition->Emergency Medical Condition (MA) FINDINGS: Pulmonary Arteries: No evidence of pulmonary embolism to the level of the segmental branches of pulmonary arteries. Subsegmental branches not diagnostically opacified. Mediastinum: No suspicious masses or lymphadenopathy. No acute abnormality visualized aorta, great vessels, subclavian, visualized carotid, visualized vertebral or visualized axillary arteries. Lungs/pleura: No acute pulmonary consolidations, airspace infiltrates, suspicious masses, pneumothorax, central airway obstruction, acute pleural disease. Foci of subpleural subsegmental atelectasis in the posterior mid-lower lung fields similar to visualized portions of thorax on 02/17/2021 which may reflect chronic process. Muna [facility] Upper Abdomen: No acute abnormality Soft Tissues/Bones: No acute bone or soft tissue abnormality. No CT angiographic evidence of pulmonary embolism or definite acute thoracic pathology. Foci of subsegmental atelectasis in the subpleural regions of lower lobes bilaterally similar to 02/17/2021 suggesting chronic process. Otherwise, no CT evidence of acute thoracic pathology.   No definite CT etiology for patient's clinical complaint of chest pain suggested on this study RECOMMENDATIONS: Unavailable       Electronically signed by Zen Abel MD on 12/16/2022 at 7:03 AM

## 2022-12-16 NOTE — PROGRESS NOTES
Dizziness and presyncopal  Bradycardia  Nausea and fatigue - etiology unclear  History of anxiety and depression   History of seizure    Stress test reviewed good chronotropic response  Post stress he had bradycardia appears more like vagal response  Reports this is all new  No recent history of fever or chills, no tick bites  No AV block    Patient probably has vasovagal symptoms  Hydration  Compression stockings to be worn during the day when walking    Conservative measures for now and reassess.      Full note to follow

## 2022-12-16 NOTE — CONSULTS
INPATIENT CARDIOLOGY CONSULT NOTE         Reason for consultation:  bradycardia     HPI     Patient is an 29-year-old gentleman who is admitted to the hospital with fatigue and nausea. Patient is noted to have bradycardia. Cardiology consulted to evaluate patient for symptomatic bradycardia. Patient denies any prior established history of cardiac arrhythmias, denies any presyncope or syncope. Denies any current use of drugs, last use of marijuana many years ago. Patient denies any sick contacts or personal sickness otherwise and intractable nausea and dizziness. EKG shows sinus bradycardia at 40 bpm        Past medical history:    has a past medical history of Arachnoid cyst, Arthritis, Chiari malformation type I ([facility] [address] Depression, IBS (irritable bowel syndrome), Migraines, and Seizures ([facility] [address] Past surgical history:   has a past surgical history that includes Appendectomy and Nasal septum surgery. Social History:   reports that he quit smoking about 2 years ago. His smoking use included cigarettes. He has a 2.50 pack-year smoking history. He has never used smokeless tobacco. He reports current alcohol use. He reports that he does not currently use drugs after having used the following drugs: Marijuana Burnell Goldberg).   Family history:   no family history of CAD, STROKE of DM    Allergies   Allergen Reactions    Ambien [Zolpidem Tartrate] Other (See Comments)     hallucinations    Wellbutrin [Bupropion] Hives    Codeine Nausea And Vomiting       technetium sestamibi (CARDIOLITE) injection 10 millicurie, ONCE PRN  technetium sestamibi (CARDIOLITE) injection 30 millicurie, ONCE PRN  sodium chloride flush 0.9 % injection 5-40 mL, 2 times per day  sodium chloride flush 0.9 % injection 5-40 mL, PRN  0.9 % sodium chloride infusion, PRN  ondansetron (ZOFRAN-ODT) disintegrating tablet 4 mg, Q8H PRN   Or  ondansetron (ZOFRAN) injection 4 mg, Q6H PRN  acetaminophen (TYLENOL) tablet 650 mg, Q6H PRN   Or  acetaminophen (TYLENOL) suppository 650 mg, Q6H PRN  polyethylene glycol (GLYCOLAX) packet 17 g, Daily PRN  aspirin chewable tablet 81 mg, Daily  atorvastatin (LIPITOR) tablet 40 mg, Nightly  enoxaparin (LOVENOX) injection 40 mg, Daily  desvenlafaxine succinate (PRISTIQ) extended release tablet 100 mg, Daily  dicyclomine (BENTYL) tablet 20 mg, TID AC  levETIRAcetam (KEPPRA) tablet 500 mg, BID      Current Facility-Administered Medications   Medication Dose Route Frequency Provider Last Rate Last Admin    technetium sestamibi (CARDIOLITE) injection 10 millicurie  10 millicurie IntraVENous ONCE PRN NUBIA Duarte - CNP        technetium sestamibi (CARDIOLITE) injection 30 millicurie  30 millicurie IntraVENous ONCE PRN NUBIA Duarte - CNP        sodium chloride flush 0.9 % injection 5-40 mL  5-40 mL IntraVENous 2 times per day Janette Reina MD   10 mL at 12/16/22 0850    sodium chloride flush 0.9 % injection 5-40 mL  5-40 mL IntraVENous PRN Janette Reina MD        0.9 % sodium chloride infusion   IntraVENous PRN Janette Reina MD        ondansetron (ZOFRAN-ODT) disintegrating tablet 4 mg  4 mg Oral Q8H PRN Janette Reina MD        Or    ondansetron (ZOFRAN) injection 4 mg  4 mg IntraVENous Q6H PRN Janette Reina MD        acetaminophen (TYLENOL) tablet 650 mg  650 mg Oral Q6H PRN Janette Reina MD   650 mg at 12/15/22 2246    Or    acetaminophen (TYLENOL) suppository 650 mg  650 mg Rectal Q6H PRN Janette Reina MD        polyethylene glycol (GLYCOLAX) packet 17 g  17 g Oral Daily PRN Janette Reina MD        aspirin chewable tablet 81 mg  81 mg Oral Daily Janette Reina MD   81 mg at 12/16/22 0849    atorvastatin (LIPITOR) tablet 40 mg  40 mg Oral Nightly Janette Reina MD   40 mg at 12/15/22 2241    enoxaparin (LOVENOX) injection 40 mg  40 mg SubCUTAneous Daily Janette Reina MD   40 mg at 12/16/22 0850    desvenlafaxine succinate (PRISTIQ) extended release tablet 100 mg  100 mg Oral Daily Flaok Liang MD   100 mg at 12/16/22 0849    dicyclomine (BENTYL) tablet 20 mg  20 mg Oral TID AC Flako Liang MD   20 mg at 12/16/22 1509    levETIRAcetam (KEPPRA) tablet 500 mg  500 mg Oral BID Flako Liang MD   500 mg at 12/16/22 3449         Review of Systems:     Constitutional: No Fever or Weight Loss   Eyes: No Decreased Vision  ENT: No Headaches, Hearing Loss or Vertigo  Cardiovascular:   no chest pain,  no dyspnea on exertion,  no palpitations or loss of consciousness  Respiratory: No cough or wheezing    Gastrointestinal: + nausea   Genitourinary: No dysuria, trouble voiding, or hematuria  Musculoskeletal:  No gait disturbance, weakness or joint complaints  Integumentary: No rash or pruritis  Neurological: No TIA or stroke symptoms  Psychiatric: No anxiety or depression  Endocrine: No malaise, fatigue or temperature intolerance  Hematologic/Lymphatic: No bleeding problems, blood clots or swollen lymph nodes  Allergic/Immunologic: No nasal congestion or hives    All other systems were reviewed and were negative otherwise. Physical Examination:      Vitals:    12/16/22 0854   BP:    Pulse: (!) 39   Resp:    Temp:    SpO2:       Wt Readings from Last 3 Encounters:   12/15/22 175 lb (79.4 kg)   05/13/22 183 lb 9.6 oz (83.3 kg)   01/17/22 185 lb (83.9 kg)     There is no height or weight on file to calculate BMI. General Appearance:  No distress, conversant  Constitutional:  Well developed, Well nourished  HEENT:  Normocephalic, Atraumatic, Oropharynx moist   Nose normal. Neck Supple Carotid: no carotid bruit  Eyes:  Conjunctiva normal, No discharge. Respiratory:    Normal breath sounds, No respiratory distress, No wheezing, no use of accessory muscles, diaphragm movement is normal  No chest Tenderness  Cardiovascular: S1-S2 No murmurs auscultated. No rubs, thrills or gallops. Normal  rhythm.  Pedal pulses are normal. Nopedal edema  GI:  Soft Non tender, non distended. Musculoskeletal:   No tenderness, No cyanosis, No clubbing. Integument:  Warm, Dry, No erythema, No rash. Lymphatic:  No lymphadenopathy noted. Neurologic:  Alert & oriented x 3  No focal deficits noted. Psychiatric:  Affect normal, Judgment normal, Mood normal.       Lab Review     Recent Labs     12/15/22  1025   WBC 9.3   HGB 14.6   HCT 42.1         Recent Labs     12/15/22  1025      K 3.9      CO2 20*   BUN 12   CREATININE 0.6*     Recent Labs     12/15/22  1025   AST 17   ALT 13   BILITOT 0.2   ALKPHOS 84     No results for input(s): TROPONINI in the last 72 hours. No results found for: BNP  Lab Results   Component Value Date    INR 0.95 08/11/2014    PROTIME 10.3 08/11/2014         All labs, images, EKGs were personally reviewed      Assessment: 30 y. o.year old with PMH of  has a past medical history of Arachnoid cyst, Arthritis, Chiari malformation type I ([facility] [address] Depression, IBS (irritable bowel syndrome), Migraines, and Seizures ([facility] [address] Medical Decision Making :       Marked sinus bradycardia  Likely pronounced vasovagal episode  ? Unclear etiology ? Viral syndrome with nausea and epigastric discomfort  History of drug abuse, marijuana , denies any current use. Chest discomfort: Nonanginal symptoms however with bradycardia will obtain stress test      Obtain rapid flu A/B  Obtain urine drug screen    EKG does not reveal any AV block,   Elevated and EKG shows sinus bradycardia  She underwent exercise stress test: Was able to achieve target heart rate, normal chronotropic and hemodynamic response.   TSH normal 0.4, free T4 1.16    EP follow up     Echocardiogram essentially normal.    History of seizure disorder on Keppra  History of anxiety and depression on medications       Thank you for the consult    Dr. Orin Ford  12/16/2022 3:32 PM

## 2022-12-17 VITALS
WEIGHT: 170.1 LBS | DIASTOLIC BLOOD PRESSURE: 60 MMHG | TEMPERATURE: 97.7 F | OXYGEN SATURATION: 97 % | RESPIRATION RATE: 22 BRPM | HEART RATE: 48 BPM | SYSTOLIC BLOOD PRESSURE: 108 MMHG | BODY MASS INDEX: 23.81 KG/M2 | HEIGHT: 71 IN

## 2022-12-17 PROCEDURE — 2580000003 HC RX 258: Performed by: INTERNAL MEDICINE

## 2022-12-17 PROCEDURE — 99231 SBSQ HOSP IP/OBS SF/LOW 25: CPT | Performed by: INTERNAL MEDICINE

## 2022-12-17 PROCEDURE — 6360000002 HC RX W HCPCS: Performed by: INTERNAL MEDICINE

## 2022-12-17 PROCEDURE — 94761 N-INVAS EAR/PLS OXIMETRY MLT: CPT

## 2022-12-17 PROCEDURE — 6370000000 HC RX 637 (ALT 250 FOR IP): Performed by: INTERNAL MEDICINE

## 2022-12-17 PROCEDURE — APPNB30 APP NON BILLABLE TIME 0-30 MINS: Performed by: NURSE PRACTITIONER

## 2022-12-17 RX ORDER — ATORVASTATIN CALCIUM 40 MG/1
40 TABLET, FILM COATED ORAL NIGHTLY
Qty: 30 TABLET | Refills: 3 | Status: SHIPPED | OUTPATIENT
Start: 2022-12-17 | End: 2022-12-22 | Stop reason: SDUPTHER

## 2022-12-17 RX ORDER — ASPIRIN 81 MG/1
81 TABLET, CHEWABLE ORAL DAILY
Qty: 30 TABLET | Refills: 3 | Status: SHIPPED | OUTPATIENT
Start: 2022-12-18 | End: 2022-12-22 | Stop reason: SDUPTHER

## 2022-12-17 RX ADMIN — LEVETIRACETAM 500 MG: 500 TABLET, FILM COATED ORAL at 09:02

## 2022-12-17 RX ADMIN — SODIUM CHLORIDE, PRESERVATIVE FREE 10 ML: 5 INJECTION INTRAVENOUS at 09:03

## 2022-12-17 RX ADMIN — ASPIRIN 81 MG CHEWABLE TABLET 81 MG: 81 TABLET CHEWABLE at 09:02

## 2022-12-17 RX ADMIN — DICYCLOMINE HYDROCHLORIDE 20 MG: 20 TABLET ORAL at 07:03

## 2022-12-17 RX ADMIN — ENOXAPARIN SODIUM 40 MG: 100 INJECTION SUBCUTANEOUS at 09:02

## 2022-12-17 RX ADMIN — DESVENLAFAXINE SUCCINATE 100 MG: 50 TABLET, EXTENDED RELEASE ORAL at 09:05

## 2022-12-17 RX ADMIN — DICYCLOMINE HYDROCHLORIDE 20 MG: 20 TABLET ORAL at 11:11

## 2022-12-17 ASSESSMENT — PAIN SCALES - GENERAL
PAINLEVEL_OUTOF10: 4
PAINLEVEL_OUTOF10: 0

## 2022-12-17 ASSESSMENT — PAIN - FUNCTIONAL ASSESSMENT: PAIN_FUNCTIONAL_ASSESSMENT: ACTIVITIES ARE NOT PREVENTED

## 2022-12-17 ASSESSMENT — PAIN DESCRIPTION - ORIENTATION: ORIENTATION: UPPER;RIGHT;LEFT

## 2022-12-17 ASSESSMENT — PAIN DESCRIPTION - PAIN TYPE: TYPE: ACUTE PAIN

## 2022-12-17 ASSESSMENT — PAIN DESCRIPTION - DESCRIPTORS: DESCRIPTORS: DISCOMFORT

## 2022-12-17 ASSESSMENT — PAIN DESCRIPTION - LOCATION: LOCATION: CHEST

## 2022-12-17 ASSESSMENT — PAIN DESCRIPTION - ONSET: ONSET: ON-GOING

## 2022-12-17 ASSESSMENT — PAIN DESCRIPTION - FREQUENCY: FREQUENCY: INTERMITTENT

## 2022-12-17 NOTE — PROGRESS NOTES
Discharge complete. This nurse went over discharge instructions with patient. Pt voiced understanding. Pt will  meds from patients pharmacy of choice. All belongings with patient. Patient wheeled to front entrance for  via pt mother. No complaints.

## 2022-12-17 NOTE — PROGRESS NOTES
Cardiology Progress Note     Today's Plan : sign off     Admit Date:  12/15/2022    Consult reason/ Seen today for: bradycardia     Subjective and  Overnight Events: States he is feeling somewhat better at this morning. He reports he had episode of bradycardia during the night. Continues to be fatigued    Telemetry: Sinus rhythm/ sinus bradycardia  Bradycardia heart rate in 50s during sleep      Assessment / Plan:     Bradycardia  Symptomatic bradycardia with heart rate down to 30s initially on admission: EKG does not reveal AV block  Heart rate has improved  Underwent stress test which showed appropriate chronotropic response to exercise. TSH 0.401  Echocardiogram normal left ventricular systolic function EF 55 to 60% with no significant valvular heart disease  Appreciate EP evaluation    Denies use of marijuana or other outside drugs or however urine tox is positive for marijuana which can cause bradycardia      Vagal response postexercise  Encouraged to keep self well-hydrated, rest when needed to use compression socks during the day    Chest pain  Resolved  Troponin negative x 3     History of Presenting Illness:    Chief complain on admission : 30 y. o.year old who is admitted forNo chief complaint on file. Past medical history:    has a past medical history of Arachnoid cyst, Arthritis, Chiari malformation type I ([address] Depression, IBS (irritable bowel syndrome), Migraines, and Seizures ([address] Past surgical history:   has a past surgical history that includes Appendectomy and Nasal septum surgery. Social History:   reports that he quit smoking about 2 years ago. His smoking use included cigarettes. He has a 2.50 pack-year smoking history. He has never used smokeless tobacco. He reports current alcohol use. He reports that he does not currently use drugs after having used the following drugs: Marijuana Elena Collins).   Family history:  family history is not on file. Allergies   Allergen Reactions    Ambien [Zolpidem Tartrate] Other (See Comments)     hallucinations    Wellbutrin [Bupropion] Hives    Codeine Nausea And Vomiting       Review of Systems:   All 14 systems were reviewed and are negative  Except for the positive findings which are documented     /70   Pulse 98   Temp 98 °F (36.7 °C) (Oral)   Resp 22   Ht 5' 11\" (1.803 m)   Wt 170 lb 1.6 oz (77.2 kg)   SpO2 97%   BMI 23.72 kg/m²     Intake/Output Summary (Last 24 hours) at 12/17/2022 0845  Last data filed at 12/16/2022 0850  Gross per 24 hour   Intake 5 ml   Output --   Net 5 ml       Physical Exam:  Physical Exam  HENT:      Head: Normocephalic. Cardiovascular:      Rate and Rhythm: Bradycardia present. Pulses: Normal pulses. Heart sounds: No murmur heard. Pulmonary:      Effort: No respiratory distress. Breath sounds: No wheezing. Abdominal:      General: There is no distension. Tenderness: There is no abdominal tenderness. Musculoskeletal:      Right lower leg: No edema. Left lower leg: No edema. Skin:     General: Skin is warm and dry. Neurological:      General: No focal deficit present. Mental Status: He is alert and oriented to person, place, and time.         Medications:    sodium chloride flush  5-40 mL IntraVENous 2 times per day    aspirin  81 mg Oral Daily    atorvastatin  40 mg Oral Nightly    enoxaparin  40 mg SubCUTAneous Daily    desvenlafaxine succinate  100 mg Oral Daily    dicyclomine  20 mg Oral TID AC    levETIRAcetam  500 mg Oral BID      sodium chloride       sodium chloride flush, sodium chloride, ondansetron **OR** ondansetron, acetaminophen **OR** acetaminophen, polyethylene glycol    Lab Data:  CBC:   Recent Labs     12/15/22  1025   WBC 9.3   HGB 14.6   HCT 42.1   MCV 86.1        BMP:   Recent Labs     12/15/22  1025      K 3.9      CO2 20*   BUN 12   CREATININE 0.6* PT/INR: No results for input(s): PROTIME, INR in the last 72 hours. BNP:  No results for input(s): PROBNP in the last 72 hours. TROPONIN:   Recent Labs     12/15/22  1025 12/15/22  2224 12/16/22  0622   TROPONINT <0.010 <0.010 <0.010              Impression:  Principal Problem:    Bradycardia  Resolved Problems:    * No resolved hospital problems. *       All labs, medications and tests reviewed by myself, continue all other medications of all above medical condition listed as is except for changes mentioned above. Thank you   Please call with questions.     Electronically signed by NUBIA Bob CNP on 12/17/2022 at 8:45 AM

## 2022-12-17 NOTE — DISCHARGE INSTRUCTIONS
Referrals have been provided for follow up in the outpatient setting  We recommend that you remain well hydrated and use compression stocking during the day  UDS was positive for cannabinoids which can cause a drop in the HR, we recommend you stop using marijuana  Please ensure that you call and set up follow up appointments on discharge  If symptoms recur or worsen please return to the nearest ED

## 2022-12-17 NOTE — PROGRESS NOTES
Cardiology Progress Note     Admit Date:  12/15/2022    Consult reason/ Seen today for :       Subjective and  Overnight Events : Continues to complain of feeling tired lethargic notices heart rate goes down he was seen for   bradydcardia but on treadmill his heart rate did go up to 170s      Chief complain on admission : 30 y. o.year old who is admitted forNo chief complaint on file. Assessment / Plan:  Bradycardia thought to be related to vagal event due to nausea evaluated by EPS service as well as a treadmill all work-up negative so far  No further work-up at this time continue hydration, compression stockings  Chest pain stress  test shows no ischemia  HTN: stable, continue To titrate up medication as needed  DVT Prophylaxis if no contraindication    Past medical history:    has a past medical history of Arachnoid cyst, Arthritis, Chiari malformation type I ([address] Depression, IBS (irritable bowel syndrome), Migraines, and Seizures ([address] Past surgical history:   has a past surgical history that includes Appendectomy and Nasal septum surgery. Social History:   reports that he quit smoking about 2 years ago. His smoking use included cigarettes. He has a 2.50 pack-year smoking history. He has never used smokeless tobacco. He reports current alcohol use. He reports that he does not currently use drugs after having used the following drugs: Marijuana Kenard Snooks). Family history:  family history is not on file.     Allergies   Allergen Reactions    Ambien [Zolpidem Tartrate] Other (See Comments)     hallucinations    Wellbutrin [Bupropion] Hives    Codeine Nausea And Vomiting       Review of Systems:    All 14 systems were reviewed and are negative  Except for the positive findings  which as documented     /60   Pulse (!) 48   Temp 97.7 °F (36.5 °C) (Oral)   Resp 22   Ht 5' 11\" (1.803 m)   Wt 170 lb 1.6 oz (77.2 kg)   SpO2 97% BMI 23.72 kg/m²   No intake or output data in the 24 hours ending 12/17/22 1725  Physical Exam:  Constitutional:  Well developed, Well nourished, No acute distress, Non-toxic appearance. HENT:  Normocephalic, Atraumatic, Bilateral external ears normal, Oropharynx moist, No oral exudates, Nose normal. Neck- Normal range of motion, No tenderness, Supple, No stridor. Eyes:  PERRL, EOMI, Conjunctiva normal, No discharge. Respiratory:  Normal breath sounds, No respiratory distress, No wheezing, No chest tenderness. Cardiovascular:  Normal heart rate, Normal rhythm, No murmurs, No rubs, No gallops, JVP not elevated  Abdomen/GI:  Bowel sounds normal, Soft, No tenderness, No masses, No pulsatile masses. Musculoskeletal:  Intact distal pulses, No edema, No tenderness, No cyanosis, No clubbing. Good range of motion in all major joints. No tenderness to palpation or major deformities noted. Back- No tenderness. Integument:  Warm, Dry, No erythema, No rash. Lymphatic:  No lymphadenopathy noted. Neurologic:  Alert & oriented x 3, Normal motor function, Normal sensory function, No focal deficits noted. Psychiatric:  Affect  and  Mood :no change    Medications:    sodium chloride flush  5-40 mL IntraVENous 2 times per day    aspirin  81 mg Oral Daily    atorvastatin  40 mg Oral Nightly    enoxaparin  40 mg SubCUTAneous Daily    desvenlafaxine succinate  100 mg Oral Daily    dicyclomine  20 mg Oral TID AC    levETIRAcetam  500 mg Oral BID      sodium chloride       sodium chloride flush, sodium chloride, ondansetron **OR** ondansetron, acetaminophen **OR** acetaminophen, polyethylene glycol    Lab Data:  CBC:   Recent Labs     12/15/22  1025   WBC 9.3   HGB 14.6   HCT 42.1   MCV 86.1        BMP:   Recent Labs     12/15/22  1025      K 3.9      CO2 20*   BUN 12   CREATININE 0.6*     PT/INR: No results for input(s): PROTIME, INR in the last 72 hours.   BNP:  No results for input(s): PROBNP in the last 72 hours. TROPONIN:   Recent Labs     12/15/22  1025 12/15/22  2224 12/16/22  0622   TROPONINT <0.010 <0.010 <0.010        ECHO :   echocardiogram    Assessment:  30 y. o.year old who is admitted forNo chief complaint on file. , active issues as noted below:  Impression:  Principal Problem:    Bradycardia  Resolved Problems:    * No resolved hospital problems. *            All labs, medications and tests reviewed by myself , continue all other medications of all above medical condition listed as is except for changes mentioned above. Thank you very much for consult , please call with questions.     Toma Magaña MD, MD 12/17/2022 5:25 PM

## 2022-12-17 NOTE — DISCHARGE SUMMARY
Discharge Summary    Name:  Janie Beach /Age/Sex: 1985  (30 y.o. male)   MRN & CSN:  [id number] & [id number] Admission Date/Time: 12/15/2022  6:49 PM   Attending:  Lakhwinder Epstein Physician: Adrian Barbosa MD     Hospital Course:   Janie Beach is a 30 y.o. male with pmh of seizures, IBS and chronic tobacco dependence who presents with symptomatic bradycardia. Patient underwent cardiac work up with cardio and EP evaluations. Below is a brief POC of hospital course. Plan:  #Bradycardia (Unclear etiology, possibly multifactorial, vasovagal vs use of cannabinoids)  #Chest discomfort - Resolved  --Pt's HR was 40-59 at [facility], improved to 60s-70s on presentation here, HR ranging from 30s - 90s while on admission. --Trops negative x3, CTA negative for an acute process, EKG showed no acute ST changes or AV block  --TSH and T4 wnl, UDS positive for cannabinoids, despite patient's denial of use  --Cardio on board, stress test completed, schieved target HR, normal chronotropic and hemodynamic response. Echo normal  --EP on board, appreciate recs: question of vasovagal symptoms, compression stockings worn during day when walking. Conservative measures with plan for reassessment  --Continue ASA and statin     #Seizure disorder  --Reports that his last seizure was 5 days ago.  Patient has an aura with his seizures and previously followed with neuro but has not been seen in a while  --Patient reportedly had not been taking Keppra at home, was restarted in house  --Seizure precautions  --Will have patient follow up with Neuro outpatient     #Depression  --Continue desvenlafaxine     #IBS  --Continue dicyclomine     #Chronic tobacco dependence  --Patient admits to vaping, refusing NRT at this time  --Discussed at length the need to stop vaping, resources offered    Patient is cleared for DC from medical standpoint    The patient expressed appropriate understanding of and agreement with the discharge recommendations, medications, and plan. Consults this admission:  IP CONSULT TO CARDIOLOGY      Discharge Instruction:   Handoff to PCP:     Follow up appointments:   Electrophysiology  Cardiology  Neurology    Primary care physician: Ramya Ortiz    Diet:  regular diet   Activity: activity as tolerated  Disposition: Discharged to:   [x]Home, []C, []SNF, []Acute Rehab, []Hospice     Condition on discharge: Stable    Discharge Medications:        Medication List        START taking these medications      aspirin 81 MG chewable tablet  Take 1 tablet by mouth daily  Start taking on: December 18, 2022     atorvastatin 40 MG tablet  Commonly known as: LIPITOR  Take 1 tablet by mouth nightly     levETIRAcetam 500 MG tablet  Commonly known as: Keppra  Take 1 tablet by mouth 2 times daily            CONTINUE taking these medications      dicyclomine 20 MG tablet  Commonly known as: BENTYL  Take 1 tablet by mouth 3 times daily as needed (abdominal pain)            ASK your doctor about these medications      albuterol sulfate  (90 Base) MCG/ACT inhaler  Commonly known as: Ventolin HFA  Inhale 2 puffs into the lungs 4 times daily as needed for Wheezing     desvenlafaxine succinate 50 MG Tb24 extended release tablet  Commonly known as: PRISTIQ  Take 2 tablets by mouth daily Disregard previous prescription for 100 mg               Where to Get Your Medications        These medications were sent to [address] [phone]  [facility] [address], [address] [place] [address]      Phone: [phone]   aspirin 81 MG chewable tablet  atorvastatin 40 MG tablet         Objective Findings at Discharge:     Vitals:    12/17/22 1201   BP: 108/60   Pulse: (!) 48   Resp: 22   Temp: 97.7 °F (36.5 °C)   SpO2: 97%     General: NAD, asleep with HR of 48 on telemetry.  When woken up and asked to sit up for exam, rate increased to 60s-70s on tele  Eyes: EOMI  ENT: moist mucous membranes  Cardiovascular: regular rate. Respiratory: Clear to auscultation  Gastrointestinal: Soft, non tender  Genitourinary: no suprapubic tenderness  Musculoskeletal: No edema  Skin: warm, dry  Neuro: Alert. Psych: Mood appropriate. BMP/CBC  Recent Labs     12/15/22  1025      K 3.9      CO2 20*   BUN 12   CREATININE 0.6*   WBC 9.3   HCT 42.1          IMAGING:  EXAMINATION:   CTA OF THE CHEST 12/15/2022 11:56 am       TECHNIQUE:   CTA of the chest was performed after the administration of intravenous   contrast.  Multiplanar reformatted images are provided for review. MIP   images are provided for review. Automated exposure control, iterative   reconstruction, and/or weight based adjustment of the mA/kV was utilized to   reduce the radiation dose to as low as reasonably achievable. COMPARISON:   Lower thoracic images abdomen pelvis CT 02/17/2021       HISTORY:   ORDERING SYSTEM PROVIDED HISTORY: chest pain   TECHNOLOGIST PROVIDED HISTORY:   Reason for exam:->chest pain   Decision Support Exception - unselect if not a suspected or confirmed   emergency medical condition->Emergency Medical Condition (MA)       FINDINGS:   Pulmonary Arteries: No evidence of pulmonary embolism to the level of the   segmental branches of pulmonary arteries. Subsegmental branches not   diagnostically opacified. Mediastinum: No suspicious masses or lymphadenopathy. No acute abnormality   visualized aorta, great vessels, subclavian, visualized carotid, visualized   vertebral or visualized axillary arteries. Lungs/pleura: No acute pulmonary consolidations, airspace infiltrates,   suspicious masses, pneumothorax, central airway obstruction, acute pleural   disease. Foci of subpleural subsegmental atelectasis in the posterior   mid-lower lung fields similar to visualized portions of thorax on 02/17/2021   which may reflect chronic process. Arya Angeli        Upper

## 2022-12-19 ENCOUNTER — TELEPHONE (OUTPATIENT)
Dept: FAMILY MEDICINE CLINIC | Age: 37
End: 2022-12-19

## 2022-12-19 NOTE — TELEPHONE ENCOUNTER
Care Transitions Initial Follow Up Call    Outreach made within 2 business days of discharge: Yes    Patient: Jose Alberto Naranjo. Patient : 1985   MRN: [id number]  Reason for Admission: There are no discharge diagnoses documented for the most recent discharge. Discharge Date: 22       Spoke with: patient-he is scheduled with [facility]    Discharge department/facility: [facility] Interactive Patient Contact:  Was patient able to fill all prescriptions: Yes  Was patient instructed to bring all medications to the follow-up visit: Yes  Is patient taking all medications as directed in the discharge summary?  Yes  Does patient understand their discharge instructions: Yes  Does patient have questions or concerns that need addressed prior to 7-14 day follow up office visit: no    Scheduled appointment with PCP within 7-14 days    Follow Up  Future Appointments   Date Time Provider Juan Milner   2022  8:45 AM Octavio Townsend MD [address]

## 2022-12-20 ENCOUNTER — OFFICE VISIT (OUTPATIENT)
Dept: CARDIOLOGY CLINIC | Age: 37
End: 2022-12-20
Payer: COMMERCIAL

## 2022-12-20 ENCOUNTER — TELEPHONE (OUTPATIENT)
Dept: CARDIOLOGY CLINIC | Age: 37
End: 2022-12-20

## 2022-12-20 ENCOUNTER — NURSE ONLY (OUTPATIENT)
Dept: CARDIOLOGY CLINIC | Age: 37
End: 2022-12-20

## 2022-12-20 VITALS
SYSTOLIC BLOOD PRESSURE: 114 MMHG | HEIGHT: 71 IN | WEIGHT: 174 LBS | DIASTOLIC BLOOD PRESSURE: 66 MMHG | HEART RATE: 64 BPM | BODY MASS INDEX: 24.36 KG/M2

## 2022-12-20 DIAGNOSIS — R00.1 BRADYCARDIA: Primary | ICD-10-CM

## 2022-12-20 DIAGNOSIS — R55 VASOVAGAL EPISODE: ICD-10-CM

## 2022-12-20 DIAGNOSIS — E78.5 DYSLIPIDEMIA: ICD-10-CM

## 2022-12-20 DIAGNOSIS — F12.10 MARIJUANA ABUSE: ICD-10-CM

## 2022-12-20 PROCEDURE — 99214 OFFICE O/P EST MOD 30 MIN: CPT | Performed by: INTERNAL MEDICINE

## 2022-12-20 NOTE — PROGRESS NOTES
30 days e-cardio monitor placed.  # [id number]. Instructed patient on how to record the event and to call monitoring center at [phone] if any problems arise. Instructed patient to disconnect the lead wires from the electrodes before bathing or showering and reattach them afterwards. Instructed patient that the electrodes should be changed every 3 days or if they no longer adhere to the skin. Patient to mail package after the monitor has ended. Patient verbalized understanding.

## 2022-12-20 NOTE — PROGRESS NOTES
Lelan Leyden, MD                                  CARDIOLOGY  NOTE        Chief Complaint:    Chief Complaint   Patient presents with    Chest Pain     Dull and heavy last hours started in the last 10 days chest is tender pain in the shoulder and neck also. SOB with episode of CP PT states he gets dizzy with these episodes and if he sits down he gets lightheaded and could pass out. Palpitations pounding last seconds     Follow-Up from Hospital        HPI:     Christopher Michelle is a 30y.o. year old male who was recently evaluated in the hospital with fatigue and nausea. Patient was noted to have significant bradycardia with heart rate in the 30s. Cardiology was consulted for symptomatic bradycardia. .    Patient recently presented with complaints of chest pain headache dizziness. Chest pain was nonspecific in origin. Patient was noted to be bradycardic with heart rate of around 40 bpm and Ativan in ER. During his hospitalization patient heart rate varied between 30 to 50 bpm.      Patient denied any drug abuse however he was tested positive for marijuana    An echocardiogram was performed which showed normal ejection fraction, essentially normal study. Echocardiogram     Left ventricular systolic function is normal.   Ejection fraction is visually estimated at 55-60%. No significant valvular disease noted. No evidence of any pericardial effusion. Exercise stress MPI: 12/16/2022    Normal hemodynamic response noted, chronotropic competent.        Recommendation    Normal Exercise Stress MPI    Good functional capacity    Normal hemodynamic response to exericse         Current Outpatient Medications   Medication Sig Dispense Refill    aspirin 81 MG chewable tablet Take 1 tablet by mouth daily 30 tablet 3    atorvastatin (LIPITOR) 40 MG tablet Take 1 tablet by mouth nightly 30 tablet 3    dicyclomine (BENTYL) 20 MG tablet Take 1 tablet by mouth 3 times daily as needed (abdominal pain) 90 tablet 0 desvenlafaxine succinate (PRISTIQ) 50 MG TB24 extended release tablet Take 2 tablets by mouth daily Disregard previous prescription for 100 mg (Patient taking differently: Take 100 mg by mouth daily) 90 tablet 0    albuterol sulfate HFA (VENTOLIN HFA) 108 (90 Base) MCG/ACT inhaler Inhale 2 puffs into the lungs 4 times daily as needed for Wheezing (Patient not taking: No sig reported) 18 g 0    levETIRAcetam (KEPPRA) 500 MG tablet Take 1 tablet by mouth 2 times daily (Patient not taking: Reported on 2022) 60 tablet 3     No current facility-administered medications for this visit. Allergies:     Ambien [zolpidem tartrate], Wellbutrin [bupropion], and Codeine    Patient History:    Past Medical History:   Diagnosis Date    Arachnoid cyst     Arthritis     Chiari malformation type I ([facility] [address]     Depression     IBS (irritable bowel syndrome)     Migraines     Seizures (HCC)      Past Surgical History:   Procedure Laterality Date    APPENDECTOMY      NASAL SEPTUM SURGERY       Family History   Problem Relation Age of Onset    High Cholesterol Mother     Heart Attack Mother     Hypertension Father     Heart Surgery Father     Heart Attack Father     Pacemaker Maternal Uncle      Social History     Tobacco Use    Smoking status: Former     Packs/day: 0.50     Years: 5.00     Pack years: 2.50     Types: Cigarettes     Quit date: 12/10/2020     Years since quittin.0    Smokeless tobacco: Never   Substance Use Topics    Alcohol use: Yes     Comment: socially        Review of Systems:     Constitutional:  No Fever or Weight Loss   Eyes: No Decreased Vision  ENT: No Headaches, Hearing Loss or Vertigo  Cardiovascular: No Chest Pain,  No Shortness of breath, No Palpitations. No Edema   Respiratory: No cough or wheezing .  No Respiratory distress   Gastrointestinal: No abdominal pain, appetite loss, blood in stools, constipation, diarrhea or heartburn  Genitourinary: No dysuria, trouble voiding, or hematuria  Musculoskeletal:  denies any new  joint aches , or pain   Integumentary: No rash or pruritis  Neurological: No TIA or stroke symptoms  Psychiatric: No anxiety or depression  Endocrine: No malaise, fatigue or temperature intolerance  Hematologic/Lymphatic: No bleeding problems, blood clots or swollen lymph nodes  Allergic/Immunologic: No nasal congestion or hives        Objective:      Physical Exam:    /66 (Site: Left Upper Arm, Position: Sitting, Cuff Size: Medium Adult)   Pulse 64   Ht 5' 11\" (1.803 m)   Wt 174 lb (78.9 kg)   BMI 24.27 kg/m²   Wt Readings from Last 3 Encounters:   12/20/22 174 lb (78.9 kg)   12/16/22 170 lb 1.6 oz (77.2 kg)   12/15/22 175 lb (79.4 kg)     Body mass index is 24.27 kg/m². Vitals:    12/20/22 0934   BP: 114/66   Pulse: 64        General Appearance and Constitutional: Conversant, Well developed, Well nourished, No acute distress, Non-toxic appearance. HEENT:  Normocephalic, Atraumatic, Bilateral external ears normal, Oropharynx moist, No oral exudates,   Nose normal.   Neck- Normal range of motion, No tenderness, Supple  Eyes:  EOMI, Conjunctiva normal, No discharge. Respiratory:  Normal breath sounds, No respiratory distress, No wheezing, No Rales, No Ronchi. No chest tenderness. Cardiovascular: S1-S2, no added heart sounds, No Mumurs appreciated. No gallops, rubs. No Pedal Edema   GI:  Bowel sounds normal, Soft, No tenderness,  :  No costovertebral angle tenderness   Musculoskeletal:  No gross deformities.  Back- No tenderness  Integument:  Well hydrated, no rash   Lymphatic:  No lymphadenopathy noted   Neurologic:  Alert & oriented x 3, Normal motor function, normal sensory function, no focal deficits noted   Psychiatric:  Speech and behavior appropriate       Medical decision making and Data review:    DATA:    Lab Results   Component Value Date    TROPONINT <0.010 12/16/2022     BNP:    Lab Results   Component Value Date    PROBNP 24.56 03/01/2018 PT/INR:  No results found for: PTINR  No results found for: LABA1C  Lab Results   Component Value Date    CHOL 138 12/16/2022    TRIG 90 12/16/2022    HDL 58 12/16/2022    LDLCALC 62 12/16/2022     Lab Results   Component Value Date    WBC 9.3 12/15/2022    HGB 14.6 12/15/2022    HCT 42.1 12/15/2022    MCV 86.1 12/15/2022     12/15/2022     TSH: No results found for: TSH  Lab Results   Component Value Date    AST 17 12/15/2022    ALT 13 12/15/2022    BILIDIR 0.2 07/22/2018    BILITOT 0.2 12/15/2022    ALKPHOS 84 12/15/2022         All labs, medications and tests reviewed by myself including data and history from outside source , patient and available family . 1. Bradycardia    2. Vasovagal episode    3. Dyslipidemia    4. Marijuana abuse         Impression and Plan:      Symptomatic bradycardia  Vasovagal event  Intractable nausea and vomiting  Marijuana abuse    Patient underwent exercise stress test which showed chronotropic competence, normal hemodynamic response, negative for ischemia. He also underwent an echocardiogram which was unremarkable  Events leading to hospitalization likely secondary to vasovagal triggered by nausea vomiting and possible marijuana use    TSH and T4 were within normal limits    Patient was also evaluated by EP which confirmed the diagnosis    Will obtain a follow-up 30-day event monitor    Return in about 3 months (around 3/20/2023). Counseled extensively and medication compliance urged. We discussed that for the  prevention of ASCVD our  goal is aggressive risk modification. Patient is encouraged to exercise even a brisk walk for 30 minutes  at least 3 to 4 times a week   Various goals were discussed and questions answered. Continue current medications. Appropriate prescriptions are addressed and refills ordered. Questions answered and patient verbalizes understanding. Call for any problems, questions, or concerns.

## 2022-12-20 NOTE — PATIENT INSTRUCTIONS
Please be informed that if you contact our office outside of normal business hours the physician on call cannot help with any scheduling or rescheduling issues, procedure instruction questions or any type of medication issue. We advise you for any urgent/emergency that you go to the nearest emergency room! PLEASE CALL OUR OFFICE DURING NORMAL BUSINESS HOURS    Monday - Friday   8 am to 5 pm    [facility]: [phone]    [place]:  [phone]  **It is YOUR responsibilty to bring medication bottles and/or updated medication list to [address] [place] [address]. This will allow us to better serve you and all your healthcare needs**  [facility] Locations - No appointment necessary. Sites open Monday to Friday. Call your preferred location for test preparation, business hours and other information you need. [employer] accepts [employer]. [place] [facility] [address] [place], [address] [facility]  Phone: [phone] [place] [facility] [address] [facility]  [address]. [place], [address] [facility]  Phone: [phone]     Thank you for allowing us to care for you today! We want to ensure we can follow your treatment plan and we strive to give you the best outcomes and experience possible. If you ever have a life threatening emergency and call 911 - for an ambulance (EMS)   Our providers can only care for you at:   [facility] or [facility]. Even if you have someone take you or you drive yourself we can only care for you in a [facility] facility. Our providers are not setup at the other healthcare locations!

## 2022-12-20 NOTE — LETTER
[address] [address]  Phone: [phone]  Fax: [phone]    Korey Hickey MD        December 20, 2022     Patient: Jose Alberto Naranjo. YOB: 1985   Date of Visit: 12/20/2022       To Whom It May Concern:    Please allow Chandrakant Blanco to wear a 30 day event monitor for cardiac testing. He is to have the device placed on his chest and carry the cell phone or have within 10 feet of him at all times. If you have any questions or concerns, please don't hesitate to call.     Sincerely,        Korey Hickey MD

## 2022-12-22 ENCOUNTER — OFFICE VISIT (OUTPATIENT)
Dept: INTERNAL MEDICINE CLINIC | Age: 37
End: 2022-12-22
Payer: COMMERCIAL

## 2022-12-22 VITALS
OXYGEN SATURATION: 97 % | HEART RATE: 46 BPM | BODY MASS INDEX: 24.22 KG/M2 | TEMPERATURE: 97.2 F | WEIGHT: 173 LBS | HEIGHT: 71 IN | DIASTOLIC BLOOD PRESSURE: 60 MMHG | RESPIRATION RATE: 16 BRPM | SYSTOLIC BLOOD PRESSURE: 92 MMHG

## 2022-12-22 DIAGNOSIS — F33.42 RECURRENT MAJOR DEPRESSIVE DISORDER, IN FULL REMISSION (HCC): ICD-10-CM

## 2022-12-22 DIAGNOSIS — F33.1 MODERATE EPISODE OF RECURRENT MAJOR DEPRESSIVE DISORDER (HCC): ICD-10-CM

## 2022-12-22 DIAGNOSIS — R56.9 SEIZURES (HCC): ICD-10-CM

## 2022-12-22 DIAGNOSIS — G93.0 ARACHNOID CYST: ICD-10-CM

## 2022-12-22 DIAGNOSIS — R00.1 BRADYCARDIA: Primary | ICD-10-CM

## 2022-12-22 DIAGNOSIS — R10.9 ABDOMINAL DISCOMFORT: ICD-10-CM

## 2022-12-22 DIAGNOSIS — G93.5 CHIARI MALFORMATION TYPE I (HCC): ICD-10-CM

## 2022-12-22 PROCEDURE — 99204 OFFICE O/P NEW MOD 45 MIN: CPT | Performed by: INTERNAL MEDICINE

## 2022-12-22 RX ORDER — DICYCLOMINE HCL 20 MG
20 TABLET ORAL 3 TIMES DAILY PRN
Qty: 90 TABLET | Refills: 3 | Status: SHIPPED | OUTPATIENT
Start: 2022-12-22

## 2022-12-22 RX ORDER — LEVETIRACETAM 500 MG/1
500 TABLET ORAL 2 TIMES DAILY
Qty: 60 TABLET | Refills: 5 | Status: SHIPPED | OUTPATIENT
Start: 2022-12-22

## 2022-12-22 RX ORDER — ATORVASTATIN CALCIUM 40 MG/1
40 TABLET, FILM COATED ORAL NIGHTLY
Qty: 30 TABLET | Refills: 5 | Status: SHIPPED | OUTPATIENT
Start: 2022-12-22

## 2022-12-22 RX ORDER — DESVENLAFAXINE 50 MG/1
50 TABLET, EXTENDED RELEASE ORAL DAILY
Qty: 30 TABLET | Refills: 5 | Status: SHIPPED | OUTPATIENT
Start: 2022-12-22

## 2022-12-22 RX ORDER — ASPIRIN 81 MG/1
81 TABLET, CHEWABLE ORAL DAILY
Qty: 30 TABLET | Refills: 5 | Status: SHIPPED | OUTPATIENT
Start: 2022-12-22

## 2022-12-22 NOTE — PROGRESS NOTES
Ming Ramos. Patient's  is 1985  Seen in office on 2022      SUBJECTIVE:  Clif[place] mg 30 y. o.year old male presents today   Chief Complaint   Patient presents with    Established New Doctor     Previous patient of Dr Miguel Shah    Discuss Medications     pristiq    Medication Refill    Anxiety     Would like referral to Dr Ellis [place] patient     H/o seizure disorder and takes Keppra   Had stopped taking it  2 months ago  Last seizure     Admitted to hospital    For bradycardia : seen cardiologist   NM stress test negative for ishemia  Echo normal  Has event monitor on    IBS : for many years. Takes bentyl prn  No melena or hematochezia    Tobacco abuse disorder : no cough or SOB     H/o anxiety and is on Prestiq. He is requesting to see psychologist   No suicidal ideation. Review of Systems   Constitutional: Negative. HENT: Negative. Eyes: Negative. Respiratory: Negative. Cardiovascular: Negative. Gastrointestinal: Negative. Endocrine: Negative. Genitourinary: Negative. Musculoskeletal: Negative. Skin: Negative. Allergic/Immunologic: Negative. Neurological:  Positive for seizures. Hematological: Negative. Psychiatric/Behavioral: Negative. OBJECTIVE: BP 92/60 (Site: Left Upper Arm, Position: Sitting, Cuff Size: Medium Adult)   Pulse (!) 46 Comment: has heart monitor on  Temp 97.2 °F (36.2 °C) (Temporal)   Resp 16   Ht 5' 11\" (1.803 m)   Wt 173 lb (78.5 kg)   SpO2 97%   BMI 24.13 kg/m²     Wt Readings from Last 3 Encounters:   22 173 lb (78.5 kg)   22 174 lb (78.9 kg)   22 170 lb 1.6 oz (77.2 kg)      GENERAL: - Alert, oriented, pleasant, in no apparent distress. HEENT: - Conjunctiva pink, no scleral icterus. ENT clear. NECK: -Supple. No jugular venous distention noted. No masses felt,  CARDIOVASCULAR: - Normal S1 and S2    PULMONARY: - No respiratory distress. No wheezes or rales.     ABDOMEN: - Soft and non-tender,no masses  ororganomegaly. EXTREMITIES: - No cyanosis, clubbing, or significant edema. SKIN: Skin is warm and dry. NEUROLOGICAL: - Cranial nerves II through XII are grossly intact. IMPRESSION:    Encounter Diagnoses   Name Primary? Bradycardia Yes    Abdominal discomfort     Moderate episode of recurrent major depressive disorder (HCC)     Seizures (HCC)     Arachnoid cyst     Chiari malformation type I (HCC)     Recurrent major depressive disorder, in full remission ([facility] [address]        ASSESSMENT/PLAN:    1. Bradycardia   Follow-up with cardiologist   Given monitor on  2. Abdominal discomfort   History of IBS with a single clinical  -     dicyclomine (BENTYL) 20 MG tablet; Take 1 tablet by mouth 3 times daily as needed (abdominal pain), Disp-90 tablet, R-3Normal  3. Moderate episode of recurrent major depressive disorder (HCC)  -     desvenlafaxine succinate (PRISTIQ) 50 MG TB24 extended release tablet; Take 1 tablet by mouth daily, Disp-30 tablet, R-5Normal  Continue above treatment  4. Seizures ([facility] [address] : Patient is taking Keppra now. Patient has an appointment with neurologist already. On 3/8/22    5. Arachnoid cyst  Overview:  Right parietal archnoid cyst   See neurologist        6. Chiari malformation type I [facility])  Overview:  CT brain in 2012 and 2013  Assessment & Plan:  Patient is asymptomatic  7. Recurrent major depressive disorder, in full remission [facility])  -     [address] [place], [place], [place]    Orders Placed This Encounter   Procedures    [address] [place], [facility], [address] [facility] reviewed with the patient. Continue current medications. Appropriate prescriptions are addressed. After visit summeryprovided. Follow up as directed sooner if needed. Questions answered and patient verbalizes understanding. Call for any problems, questions, or concerns.        Allergies   Allergen Reactions    Ambien [Zolpidem Tartrate] Other (See Comments)     hallucinations    Wellbutrin [Bupropion] Hives    Codeine Nausea And Vomiting     Current Outpatient Medications   Medication Sig Dispense Refill    aspirin 81 MG chewable tablet Take 1 tablet by mouth daily 30 tablet 3    atorvastatin (LIPITOR) 40 MG tablet Take 1 tablet by mouth nightly 30 tablet 3    dicyclomine (BENTYL) 20 MG tablet Take 1 tablet by mouth 3 times daily as needed (abdominal pain) 90 tablet 0    desvenlafaxine succinate (PRISTIQ) 50 MG TB24 extended release tablet Take 2 tablets by mouth daily Disregard previous prescription for 100 mg (Patient taking differently: Take 100 mg by mouth daily) 90 tablet 0    levETIRAcetam (KEPPRA) 500 MG tablet Take 1 tablet by mouth 2 times daily 60 tablet 3     No current facility-administered medications for this visit. Past Medical History:   Diagnosis Date    Arachnoid cyst     Arthritis     Bradycardia     Chiari malformation type I (HCC)     Depression     IBS (irritable bowel syndrome)     Migraines     Seizures (HCC)      Past Surgical History:   Procedure Laterality Date    APPENDECTOMY      NASAL SEPTUM SURGERY       Social History     Tobacco Use    Smoking status: Former     Packs/day: 1.00     Years: 15.00     Pack years: 15.00     Types: Cigarettes     Quit date: 2022     Years since quittin.5    Smokeless tobacco: Never    Tobacco comments:     Does vaping   Substance Use Topics    Alcohol use:  Yes     Alcohol/week: 3.0 standard drinks     Types: 1 Glasses of wine, 1 Cans of beer, 1 Shots of liquor per week     Comment: socially       LAB REVIEW:  CBC:   Lab Results   Component Value Date/Time    WBC 9.3 12/15/2022 10:25 AM    HGB 14.6 12/15/2022 10:25 AM    HCT 42.1 12/15/2022 10:25 AM     12/15/2022 10:25 AM     Lipids:   Lab Results   Component Value Date    HDL 58 2022    LDLCALC 62 2022     Renal:   Lab Results   Component Value Date/Time    BUN 12 12/15/2022 10:25 AM    CREATININE 0.6 12/15/2022 10:25 AM     12/15/2022 10:25 AM    K 3.9 12/15/2022 10:25 AM    ALT 13 12/15/2022 10:25 AM    AST 17 12/15/2022 10:25 AM    GLUCOSE 118 12/15/2022 10:25 AM    GLUF 66 10/14/2015 11:49 PM     PT/INR:   Lab Results   Component Value Date/Time    INR 0.95 08/11/2014 06:35 AM     A1C: No results found for: Mukund Covington MD, 12/22/2022 , 9:37 AM

## 2023-01-01 ASSESSMENT — ENCOUNTER SYMPTOMS
SHORTNESS OF BREATH: 0
PHOTOPHOBIA: 0
BLOOD IN STOOL: 0
EYE PAIN: 0
VOMITING: 0
COUGH: 0
NAUSEA: 1
CONSTIPATION: 0
COLOR CHANGE: 0
ABDOMINAL PAIN: 0
WHEEZING: 0
BACK PAIN: 0
DIARRHEA: 0
CHEST TIGHTNESS: 0

## 2023-01-02 ASSESSMENT — ENCOUNTER SYMPTOMS
ALLERGIC/IMMUNOLOGIC NEGATIVE: 1
GASTROINTESTINAL NEGATIVE: 1
EYES NEGATIVE: 1
RESPIRATORY NEGATIVE: 1

## 2023-01-27 ENCOUNTER — TELEPHONE (OUTPATIENT)
Dept: CARDIOLOGY CLINIC | Age: 38
End: 2023-01-27

## 2023-01-27 NOTE — TELEPHONE ENCOUNTER
30-day event monitor  Indication bradycardia  Average heart rate 77 bpm  Minimum heart rate 35 bpm at 10:15 AM, sinus bradycardia  Maximum heart rate 184 bpm  No significant pauses  Multiple documented sinus bradycardia episodes with symptoms of chest pressure  Symptoms of chest pressure correspond to normal sinus rhythm/tachycardia        Conclusion:  Normal sinus rhythm  No atrial fibrillation  Episodes of sinus bradycardia  Outpatient follow-up    May speak to pt or wife Chapis Overall to pt regarding results of heart monitor and keeping his appt on 03/30/2023 @ [phone]

## 2023-02-02 ENCOUNTER — OFFICE VISIT (OUTPATIENT)
Dept: INTERNAL MEDICINE CLINIC | Age: 38
End: 2023-02-02
Payer: COMMERCIAL

## 2023-02-02 VITALS
SYSTOLIC BLOOD PRESSURE: 120 MMHG | WEIGHT: 188.8 LBS | TEMPERATURE: 97.1 F | HEART RATE: 62 BPM | DIASTOLIC BLOOD PRESSURE: 78 MMHG | RESPIRATION RATE: 16 BRPM | BODY MASS INDEX: 26.33 KG/M2 | OXYGEN SATURATION: 98 %

## 2023-02-02 DIAGNOSIS — F33.42 RECURRENT MAJOR DEPRESSIVE DISORDER, IN FULL REMISSION (HCC): ICD-10-CM

## 2023-02-02 DIAGNOSIS — G40.909 RECURRENT SEIZURES (HCC): ICD-10-CM

## 2023-02-02 DIAGNOSIS — R00.1 BRADYCARDIA: Primary | ICD-10-CM

## 2023-02-02 DIAGNOSIS — G93.0 ARACHNOID CYST: ICD-10-CM

## 2023-02-02 DIAGNOSIS — G93.5 CHIARI MALFORMATION TYPE I (HCC): ICD-10-CM

## 2023-02-02 DIAGNOSIS — E78.5 DYSLIPIDEMIA: ICD-10-CM

## 2023-02-02 DIAGNOSIS — K58.0 IRRITABLE BOWEL SYNDROME WITH DIARRHEA: ICD-10-CM

## 2023-02-02 PROCEDURE — 99214 OFFICE O/P EST MOD 30 MIN: CPT | Performed by: INTERNAL MEDICINE

## 2023-02-02 ASSESSMENT — PATIENT HEALTH QUESTIONNAIRE - PHQ9
SUM OF ALL RESPONSES TO PHQ QUESTIONS 1-9: 0
SUM OF ALL RESPONSES TO PHQ QUESTIONS 1-9: 0
SUM OF ALL RESPONSES TO PHQ9 QUESTIONS 1 & 2: 0
SUM OF ALL RESPONSES TO PHQ QUESTIONS 1-9: 0
1. LITTLE INTEREST OR PLEASURE IN DOING THINGS: 0
2. FEELING DOWN, DEPRESSED OR HOPELESS: 0
SUM OF ALL RESPONSES TO PHQ QUESTIONS 1-9: 0

## 2023-02-02 NOTE — PROGRESS NOTES
Tahmina Fischer. Patient's  is 1985  Seen in office on 2023      SUBJECTIVE:  Adan mg 30 y. o.year old male presents today   Chief Complaint   Patient presents with    Follow-up    Bleeding/Bruising     Bruise noted on right forearm     Patient is here for follow-up of bradycardia, IBS, seizures, hyperlipidemia  Patient states he is feeling well  He had the event monitor for bradycardia and his heart rate dropped to 35 1 time. But patient remained asymptomatic. No dizziness. No syncope or near syncope. He has a follow-up appointment with cardiologist.  He has a history of IBS for many years and Bentyl does help. He takes 1 dose in the morning and that helps the cramps and diarrhea. He has a history of hyperlipidemia and is on atorvastatin. He had no seizures for some time. He is taking Keppra and has a appointment with neurologist.    He has a history of depression that is controlled with Pristiq. He did not get the appointment with Dr. Hayden Recinos yet. Patient denies any chest pain. No shortness of breath no cough or sputum production. Taking medications regularly. No side effects noted. Pt lifted 50 lbs bag , he thinks he carried on his forear  He noticed bruise on the forearm      Review of Systems  Review of system normal except as in HPI  OBJECTIVE: /78   Pulse 62   Temp 97.1 °F (36.2 °C) (Temporal)   Resp 16   Wt 188 lb 12.8 oz (85.6 kg)   SpO2 98%   BMI 26.33 kg/m²     Wt Readings from Last 3 Encounters:   23 188 lb 12.8 oz (85.6 kg)   22 173 lb (78.5 kg)   22 174 lb (78.9 kg)      GENERAL: - Alert, oriented, pleasant, in no apparent distress. HEENT: - Conjunctiva pink, no scleral icterus. ENT clear. NECK: -Supple. No jugular venous distention noted. No masses felt,  CARDIOVASCULAR: - Normal S1 and S2    PULMONARY: - No respiratory distress. No wheezes or rales.     ABDOMEN: - Soft and non-tender,no masses ororganomegaly. EXTREMITIES: - No cyanosis, clubbing, or significant edema. Mild bruise on the forearm . No tenderness. ROM elbow and wrist normal.   SKIN: Skin is warm and dry. NEUROLOGICAL: - Cranial nerves II through XII are grossly intact. 30-day event monitor  Indication bradycardia  Average heart rate 77 bpm  Minimum heart rate 35 bpm at 10:15 AM, sinus bradycardia  Maximum heart rate 184 bpm  No significant pauses  Multiple documented sinus bradycardia episodes with symptoms of chest pressure  Symptoms of chest pressure correspond to normal sinus rhythm/tachycardia        Conclusion:  Normal sinus rhythm  No atrial fibrillation  Episodes of sinus bradycardia  Outpatient follow-up    IMPRESSION:    Encounter Diagnoses   Name Primary? Bradycardia Yes    Chiari malformation type I ([address]     Recurrent major depressive disorder, in full remission ([address]     Recurrent seizures ([address]     Arachnoid cyst     Irritable bowel syndrome with diarrhea     Dyslipidemia        ASSESSMENT/PLAN:    1. Bradycardia  Overview:  Bradycardia on event monitor  No syncope  Has appointment with cardiologist   Assessment & Plan:   As above   2. Chiari malformation type I ([address]  Overview:  CT brain in 2012 and 2013  Asymptomatic   3. Recurrent major depressive disorder, in full remission ([address]  Overview:  H/o of depression since age 14  On prestiq 50 mg daily   Doing well. Referred to psychologist Dr Irving Edwards:   Med helping   4. Recurrent seizures ([address]  Overview:  H/o seizures since age 5  Seen neurologist in the past   Now on Keppra and is doing well  Going to see neurologist   Assessment & Plan:   No seizures  5. Arachnoid cyst  Overview:  Right parietal archnoid cyst dx 2008  Seen neurologist   Assessment & Plan:   F/u with neuro  6. Irritable bowel syndrome with diarrhea  Overview:  Dx several years ago ? 2008.  Had EGD in the past and does not remember if he had colonoscopy   Taking bentyl prn and that helps     7. Right arm mild bruise : should resolve. If persist RTO    Return to office in 3 months. Mediations reviewed with the patient. Continue current medications. Appropriate prescriptions are addressed. After visit summeryprovided. Follow up as directed sooner if needed. Questions answered and patient verbalizes understanding. Call for any problems, questions, or concerns. Allergies   Allergen Reactions    Ambien [Zolpidem Tartrate] Other (See Comments)     hallucinations    Wellbutrin [Bupropion] Hives    Codeine Nausea And Vomiting     Current Outpatient Medications   Medication Sig Dispense Refill    aspirin 81 MG chewable tablet Take 1 tablet by mouth daily 30 tablet 5    atorvastatin (LIPITOR) 40 MG tablet Take 1 tablet by mouth nightly 30 tablet 5    dicyclomine (BENTYL) 20 MG tablet Take 1 tablet by mouth 3 times daily as needed (abdominal pain) 90 tablet 3    desvenlafaxine succinate (PRISTIQ) 50 MG TB24 extended release tablet Take 1 tablet by mouth daily 30 tablet 5    levETIRAcetam (KEPPRA) 500 MG tablet Take 1 tablet by mouth 2 times daily 60 tablet 5     No current facility-administered medications for this visit. Past Medical History:   Diagnosis Date    Arachnoid cyst     Arthritis     Bradycardia     [facility]ari malformation type I ([facility]     Depression     History of cardiac monitoring     IBS (irritable bowel syndrome)     Migraines     Seizures (HCC)      Past Surgical History:   Procedure Laterality Date    APPENDECTOMY      NASAL SEPTUM SURGERY       Social History     Tobacco Use    Smoking status: Former     Packs/day: 1.00     Years: 15.00     Pack years: 15.00     Types: Cigarettes     Quit date: 2022     Years since quittin.6    Smokeless tobacco: Never    Tobacco comments:     Does vaping   Substance Use Topics    Alcohol use:  Yes     Alcohol/week: 3.0 standard drinks     Types: 1 Glasses of wine, 1 Cans of beer, 1 Shots of liquor per week     Comment: socially       LAB REVIEW:  CBC:   Lab Results   Component Value Date/Time    WBC 9.3 12/15/2022 10:25 AM    HGB 14.6 12/15/2022 10:25 AM    HCT 42.1 12/15/2022 10:25 AM     12/15/2022 10:25 AM     Lipids:   Lab Results   Component Value Date    HDL 58 12/16/2022    LDLCALC 62 12/16/2022     Renal:   Lab Results   Component Value Date/Time    BUN 12 12/15/2022 10:25 AM    CREATININE 0.6 12/15/2022 10:25 AM     12/15/2022 10:25 AM    K 3.9 12/15/2022 10:25 AM    ALT 13 12/15/2022 10:25 AM    AST 17 12/15/2022 10:25 AM    GLUCOSE 118 12/15/2022 10:25 AM    GLUF 66 10/14/2015 11:49 PM     PT/INR:   Lab Results   Component Value Date/Time    INR 0.95 08/11/2014 06:35 AM     A1C: No results found for: Anselmo Choi MD, 2/2/2023 , 9:13 AM

## 2023-02-09 DIAGNOSIS — F33.1 MODERATE EPISODE OF RECURRENT MAJOR DEPRESSIVE DISORDER (HCC): ICD-10-CM

## 2023-02-09 RX ORDER — LEVETIRACETAM 500 MG/1
500 TABLET ORAL 2 TIMES DAILY
Qty: 60 TABLET | Refills: 5 | Status: SHIPPED | OUTPATIENT
Start: 2023-02-09

## 2023-02-09 RX ORDER — DESVENLAFAXINE 50 MG/1
50 TABLET, EXTENDED RELEASE ORAL DAILY
Qty: 30 TABLET | Refills: 5 | Status: SHIPPED | OUTPATIENT
Start: 2023-02-09

## 2023-02-11 ENCOUNTER — APPOINTMENT (OUTPATIENT)
Dept: GENERAL RADIOLOGY | Age: 38
End: 2023-02-11
Payer: COMMERCIAL

## 2023-02-11 ENCOUNTER — HOSPITAL ENCOUNTER (EMERGENCY)
Age: 38
Discharge: HOME | End: 2023-02-11
Attending: STUDENT IN AN ORGANIZED HEALTH CARE EDUCATION/TRAINING PROGRAM
Payer: COMMERCIAL

## 2023-02-11 VITALS
WEIGHT: 188 LBS | OXYGEN SATURATION: 97 % | RESPIRATION RATE: 16 BRPM | HEART RATE: 79 BPM | BODY MASS INDEX: 26.22 KG/M2 | SYSTOLIC BLOOD PRESSURE: 124 MMHG | TEMPERATURE: 97.7 F | DIASTOLIC BLOOD PRESSURE: 79 MMHG

## 2023-02-11 DIAGNOSIS — S69.91XA INJURY OF RIGHT HAND, INITIAL ENCOUNTER: Primary | ICD-10-CM

## 2023-02-11 PROCEDURE — 6370000000 HC RX 637 (ALT 250 FOR IP): Performed by: STUDENT IN AN ORGANIZED HEALTH CARE EDUCATION/TRAINING PROGRAM

## 2023-02-11 PROCEDURE — 73130 X-RAY EXAM OF HAND: CPT

## 2023-02-11 PROCEDURE — 99283 EMERGENCY DEPT VISIT LOW MDM: CPT | Performed by: STUDENT IN AN ORGANIZED HEALTH CARE EDUCATION/TRAINING PROGRAM

## 2023-02-11 RX ORDER — ACETAMINOPHEN 325 MG/1
650 TABLET ORAL ONCE
Status: COMPLETED | OUTPATIENT
Start: 2023-02-11 | End: 2023-02-11

## 2023-02-11 RX ADMIN — ACETAMINOPHEN 650 MG: 325 TABLET ORAL at 07:38

## 2023-02-11 ASSESSMENT — PAIN DESCRIPTION - ORIENTATION: ORIENTATION: RIGHT

## 2023-02-11 ASSESSMENT — PAIN - FUNCTIONAL ASSESSMENT: PAIN_FUNCTIONAL_ASSESSMENT: 0-10

## 2023-02-11 ASSESSMENT — PAIN SCALES - GENERAL
PAINLEVEL_OUTOF10: 6
PAINLEVEL_OUTOF10: 6

## 2023-02-11 ASSESSMENT — LIFESTYLE VARIABLES
HOW OFTEN DO YOU HAVE A DRINK CONTAINING ALCOHOL: MONTHLY OR LESS
HOW MANY STANDARD DRINKS CONTAINING ALCOHOL DO YOU HAVE ON A TYPICAL DAY: 1 OR 2

## 2023-02-11 ASSESSMENT — PAIN DESCRIPTION - LOCATION: LOCATION: HAND

## 2023-02-11 NOTE — ED PROVIDER NOTES
Emergency Department Encounter    Patient: Mary Arias MRN: [id number]  : 1985  Date of Evaluation: 2023  ED Provider:  Deanna Javier MD    Triage Chief Complaint:   Hand Injury (Right hand hit a chair while walking thru house)    [place]:  Mary Arias is a 30 y.o. male that presents with right hand pain. He states about 2 days ago he punched a chair. Since that time has had pain. Pain localized to the fourth and fifth MCP and metacarpals in the right hand. Patient is right-hand dominant. No pain in the wrist, forearm, elbow, upper arm or shoulder. Denies numbness or tingling. No pain out in the fingers but does have pain with movement of the fourth and fifth digits. Review Of Systems   Review of Systems   Constitutional:  Negative for chills and fever. Musculoskeletal:  Positive for joint swelling. Physical Exam   Triage VS:    ED Triage Vitals [23 0631]   Enc Vitals Group      /79      Heart Rate 79      Resp 16      Temp 97.7 °F (36.5 °C)      Temp Source Oral      SpO2 97 %      Weight 188 lb (85.3 kg)      Height       Head Circumference       Peak Flow       Pain Score       Pain Loc       Pain Edu? Excl. in [address]? Physical Exam  Vitals and nursing note reviewed. Constitutional:       General: He is not in acute distress. Appearance: Normal appearance. He is not toxic-appearing. HENT:      Head: Normocephalic and atraumatic. Cardiovascular:      Rate and Rhythm: Normal rate. Pulmonary:      Effort: Pulmonary effort is normal.   Musculoskeletal:      Comments: Right hand with swelling and ecchymosis at the fourth MCP with extension of swelling to the fifth MCP and extending proximally along the fourth and fifth metacarpals. No snuffbox tenderness. Full range of motion of the right wrist, full flexion extension of all MCP, PIP and DIP joints.   Does have pain with flexion extension of the fourth and fifth MCPs, without limitation in this range of motion. Cardinal movements of the hand are intact. No sensory deficit within the hands. Radial pulse 2+. Cap refill in the distal digits. Skin:     General: Skin is warm. Capillary Refill: Capillary refill takes less than 2 seconds. Neurological:      Mental Status: He is alert. EKG (if obtained): (All EKG's are interpreted by myself in the absence of a cardiologist)      MDM:    Differential Diagnosis includes but is not limited to: Fracture, dislocation, contusion, abrasion, ecchymosis, sprain tendon and ligamentous injury    ED Course:     Is hemodynamically stable, nontoxic-appearing in no acute distress. Here with pain to the fourth and fifth MCP and metacarpals after punching a chair about 2 days ago. Pain has been persistent. Pain is worse with movement of the fourth and fifth digit. X-ray obtained. Pain treated with Tylenol. Denies any other injuries. Patient is right-hand dominant. Xray with no acute  fracture dislocation. Provided Ace bandage. Discussed use of rest, ice, compression elevation. Discussed follow-up plan. Return precautions given. Questions answered. Discharged in stable condition. External Record Review: Office visit from 2/2/2023 reviewed. Diagnostic Studies interpreted by me: No acute displaced fracture on my read. Medication Route:   Medications   acetaminophen (TYLENOL) tablet 650 mg (has no administration in time range)       Medication Reassessment: Pain improved on reassessment. Medications Considered but not given: Considered additional pain medication such as narcotics but plan to initially attempt pain control with non narcotic medications     I have reviewed and interpreted all of the currently available lab results from this visit (if applicable):  No results found for this visit on 02/11/23. Radiographs (if obtained):  Radiologist's Report Reviewed:  No results found.         Past Medical History: Diagnosis Date    Arachnoid cyst     Arthritis     Bradycardia     Chiari malformation type I ([facility]     Depression     History of cardiac monitoring     IBS (irritable bowel syndrome)     Migraines     Seizures (HCC)      Past Surgical History:   Procedure Laterality Date    APPENDECTOMY      NASAL SEPTUM SURGERY       Family History   Problem Relation Age of Onset    High Cholesterol Mother     Heart Attack Mother     Hypertension Father     Heart Surgery Father     Heart Attack Father     High Cholesterol Brother     Pacemaker Maternal Uncle      Social History     Socioeconomic History    Marital status:      Spouse name: Ata Delaney    Number of children: 3    Years of education: 12    Highest education level: Not on file   Occupational History    Not on file   Tobacco Use    Smoking status: Former     Packs/day: 1.00     Years: 15.00     Pack years: 15.00     Types: Cigarettes     Quit date: 2022     Years since quittin.6    Smokeless tobacco: Never    Tobacco comments:     Does vaping   Vaping Use    Vaping Use: Every day   Substance and Sexual Activity    Alcohol use:  Yes     Alcohol/week: 3.0 standard drinks     Types: 1 Glasses of wine, 1 Cans of beer, 1 Shots of liquor per week     Comment: socially    Drug use: Not Currently     Types: Marijuana [employer])     Comment: has quit Dec 1 2022    Sexual activity: Yes     Partners: Female   Other Topics Concern    Not on file   Social History Narrative    Not on file     Social Determinants of Health     Financial Resource Strain: Not on file   Food Insecurity: Not on file   Transportation Needs: Not on file   Physical Activity: Not on file   Stress: Not on file   Social Connections: Not on file   Intimate Partner Violence: Not on file   Housing Stability: Not on file     Current Facility-Administered Medications   Medication Dose Route Frequency Provider Last Rate Last Admin    acetaminophen (TYLENOL) tablet 650 mg  650 mg Oral Once Gareth Bray MD Current Outpatient Medications   Medication Sig Dispense Refill    desvenlafaxine succinate (PRISTIQ) 50 MG TB24 extended release tablet Take 1 tablet by mouth daily 30 tablet 5    levETIRAcetam (KEPPRA) 500 MG tablet Take 1 tablet by mouth 2 times daily 60 tablet 5    aspirin 81 MG chewable tablet Take 1 tablet by mouth daily 30 tablet 5    atorvastatin (LIPITOR) 40 MG tablet Take 1 tablet by mouth nightly 30 tablet 5    dicyclomine (BENTYL) 20 MG tablet Take 1 tablet by mouth 3 times daily as needed (abdominal pain) 90 tablet 3     Allergies   Allergen Reactions    Ambien [Zolpidem Tartrate] Other (See Comments)     hallucinations    Wellbutrin [Bupropion] Hives    Codeine Nausea And Vomiting     Social Determinant Impacting Care:   Social Determinants of Health     Tobacco Use: Medium Risk    Smoking Tobacco Use: Former    Smokeless Tobacco Use: Never    Passive Exposure: Not on file   Alcohol Use: Not on file   Financial Resource Strain: Not on file   Food Insecurity: Not on file   Transportation Needs: Not on file   Physical Activity: Not on file   Stress: Not on file   Social Connections: Not on file   Intimate Partner Violence: Not on file   Depression: Not at risk    PHQ-2 Score: 0   Housing Stability: Not on file     Chronic Conditions Impacting Care:    Active Ambulatory Problems     Diagnosis Date Noted    Recurrent seizures ([facility] 07/21/2018    Seizures ([facility] 03/13/2019    Arachnoid cyst     Bradycardia 12/15/2022    Chiari malformation type I ([facility] 12/22/2022    Recurrent major depressive disorder, in full remission ([facility] 12/22/2022    Irritable bowel syndrome with diarrhea 02/02/2023    Dyslipidemia 02/02/2023     Resolved Ambulatory Problems     Diagnosis Date Noted    Chest pain 12/13/2020     Past Medical History:   Diagnosis Date    Arthritis     Depression     History of cardiac monitoring     IBS (irritable bowel syndrome)     Migraines        Nursing Notes Reviewed        I have reviewed and interpreted all of the currently available lab results from this visit (if applicable):  No results found for this visit on 02/11/23. Radiographs (if obtained):  Radiologist's Report Reviewed:  No results found. Clinical Impression:  1. Injury of right hand, initial encounter      Disposition referral (if applicable):  Baldev De La Cruz MD  [address] [address] [facility]  [phone]    In 3 days      [facility] Emergency Department  [facility] [address] [facility] [address] [phone]    As needed, If symptoms worsen  Disposition medications (if applicable):  New Prescriptions    No medications on file     ED Provider Disposition Time  DISPOSITION Decision To Discharge 02/11/2023 07:15:42 AM      Comment: Please note this report has been produced using speech recognition software and may contain errors related to that system including errors in grammar, punctuation, and spelling, as well as words and phrases that may be inappropriate. Efforts were made to edit the dictations.         Gareth Bray MD  02/11/23 0337

## 2023-02-11 NOTE — DISCHARGE INSTRUCTIONS
You were seen today for swelling and pain to the right hand. Your x-ray does not show any broken bones. You may use the Ace bandage for additional support and compression. You may also ice the area to help with swelling. You may take ibuprofen and Tylenol for pain. Follow-up with your primary care physician. If you have any worsening or concerning symptoms, please return to the emergency department at any time.

## 2023-02-13 DIAGNOSIS — F33.1 MODERATE EPISODE OF RECURRENT MAJOR DEPRESSIVE DISORDER (HCC): ICD-10-CM

## 2023-02-13 RX ORDER — DESVENLAFAXINE 50 MG/1
50 TABLET, EXTENDED RELEASE ORAL DAILY
Qty: 90 TABLET | Refills: 1 | Status: SHIPPED | OUTPATIENT
Start: 2023-02-13

## 2023-02-13 RX ORDER — LEVETIRACETAM 500 MG/1
500 TABLET ORAL 2 TIMES DAILY
Qty: 180 TABLET | Refills: 1 | Status: SHIPPED | OUTPATIENT
Start: 2023-02-13

## 2023-03-08 ENCOUNTER — OFFICE VISIT (OUTPATIENT)
Dept: NEUROLOGY | Age: 38
End: 2023-03-08
Payer: COMMERCIAL

## 2023-03-08 VITALS
HEART RATE: 82 BPM | HEIGHT: 71 IN | SYSTOLIC BLOOD PRESSURE: 132 MMHG | BODY MASS INDEX: 26.65 KG/M2 | WEIGHT: 190.4 LBS | OXYGEN SATURATION: 97 % | DIASTOLIC BLOOD PRESSURE: 72 MMHG

## 2023-03-08 DIAGNOSIS — R56.9 SEIZURES (HCC): ICD-10-CM

## 2023-03-08 DIAGNOSIS — G43.009 MIGRAINE WITHOUT AURA, NOT INTRACTABLE, WITHOUT STATUS MIGRAINOSUS: Primary | ICD-10-CM

## 2023-03-08 PROCEDURE — 99205 OFFICE O/P NEW HI 60 MIN: CPT | Performed by: PSYCHIATRY & NEUROLOGY

## 2023-03-08 RX ORDER — RIZATRIPTAN BENZOATE 10 MG/1
10 TABLET ORAL
Qty: 9 TABLET | Refills: 2 | Status: SHIPPED | OUTPATIENT
Start: 2023-03-08 | End: 2023-03-08

## 2023-03-08 RX ORDER — TOPIRAMATE 50 MG/1
50 TABLET, FILM COATED ORAL 2 TIMES DAILY
Qty: 60 TABLET | Refills: 5 | Status: SHIPPED | OUTPATIENT
Start: 2023-03-08

## 2023-03-08 RX ORDER — TOPIRAMATE 25 MG/1
TABLET ORAL
Qty: 60 TABLET | Refills: 0 | Status: SHIPPED | OUTPATIENT
Start: 2023-03-08

## 2023-03-08 NOTE — PROGRESS NOTES
3/8/23    Michael Heard.  1985    Chief Complaint   Patient presents with    Seizures     Pt presents for seizures, pt is following up from [facility] ED, pt states last seizure was around November or December 2022       History of Present Illness  Jomar Silva is a 30 y.o. 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  He has been on Keppra for several years and tells me that seizures have been much better controlled. He does miss a couple doses per week but otherwise is on a 500 mg twice daily. He denies any side effects to the Keppra. He states that stress tends to be a trigger or that migraines tend to be a trigger for seizures. His last seizure he believes was around October or November 2022. There is no family history of seizures. He tells me his sleep is good. Migraines tend to start off as a \"normal\" headache but then progressed to get more intense. They are typically on the right side of the head and behind the right eye as a more pressure sensation. Sometimes they can be on the back of the right side of the head more as a stabbing sensation. He does not take any medicine for this. He had tried Imitrex in the past but this was not helpful. He tried Neurontin in the past and this was not helpful. He had also tried Stadol, Vicodin, and Percocets in the past.  He is getting about 3-4 migraines per week. They can last up to 3 to 4 hours but sometimes are just a momentary sharp pain. He denies any neck pain. He tells me that his sleep is good. He works as a . He had tried Depakote in the past and this caused him to have weight gain.       Subjective    Review of Symptoms:  Neurologic   Symptoms: headaches, seizures, no difficulty with gait or walking, no bowel symptoms, no vertigo, no confusion, no memory loss, no speech disorder, no visual loss, no double vision, no dizziness, no loss of hearing, no sensory disturbances, no weakness, no bladder symptoms, no excessive fatigue, and no syncope    Current Outpatient Medications   Medication Sig Dispense Refill    topiramate (TOPAMAX) 25 MG tablet Take 1 tab q AM x 7 days, then 1 tab BID x 7 days, then 2 tabs am and 1 tab PM x 7 days, then 2 tabs BID thereafter 60 tablet 0    topiramate (TOPAMAX) 50 MG tablet Take 1 tablet by mouth 2 times daily NOTE TO PHARMACY: DO NOT FILL UNTIL 25 MG RX IS COMPLETE 60 tablet 5    rizatriptan (MAXALT) 10 MG tablet Take 1 tablet by mouth once as needed for Migraine Take 1 tab @ onset of migraine. May repeat in 2 hrs if needed. Not to exceed 2 tabs per day 9 tablet 2    desvenlafaxine succinate (PRISTIQ) 50 MG TB24 extended release tablet Take 1 tablet by mouth daily 90 tablet 1    levETIRAcetam (KEPPRA) 500 MG tablet Take 1 tablet by mouth 2 times daily 180 tablet 1    aspirin 81 MG chewable tablet Take 1 tablet by mouth daily 30 tablet 5    atorvastatin (LIPITOR) 40 MG tablet Take 1 tablet by mouth nightly 30 tablet 5    dicyclomine (BENTYL) 20 MG tablet Take 1 tablet by mouth 3 times daily as needed (abdominal pain) 90 tablet 3     No current facility-administered medications for this visit.        Past Medical History:   Diagnosis Date    Arachnoid cyst     Arthritis     Bradycardia     Chiari malformation type I ([facility] [address]     Depression     History of cardiac monitoring     IBS (irritable bowel syndrome)     Migraines     Seizures (HCC)        Past Surgical History:   Procedure Laterality Date    APPENDECTOMY      NASAL SEPTUM SURGERY          Social History     Socioeconomic History    Marital status:      Spouse name: Susan Cheng    Number of children: 3    Years of education: 12    Highest education level: None   Tobacco Use    Smoking status: Former     Packs/day: 1.00     Years: 15.00     Pack years: 15.00     Types: Cigarettes     Quit date: 6/1/2022     Years since quittin.7    Smokeless tobacco: Never    Tobacco comments:     Does vaping   Vaping Use    Vaping Use: Every day   Substance and Sexual Activity    Alcohol use:  Yes     Alcohol/week: 3.0 standard drinks     Types: 1 Glasses of wine, 1 Cans of beer, 1 Shots of liquor per week     Comment: socially    Drug use: Not Currently     Types: Marijuana Charmayne Stai)     Comment: has quit Dec 1 2022    Sexual activity: Yes     Partners: Female       Family History   Problem Relation Age of Onset    High Cholesterol Mother     Heart Attack Mother     Hypertension Father     Heart Surgery Father     Heart Attack Father     High Cholesterol Brother     Pacemaker Maternal Uncle        Objective    Physical Exam:    Constitutional   Weight: well nourished  Heart/Vascular   Rate and Rhythm: RRR   Murmurs: none   Arterial Pulses:  no carotid bruits  Neck   Appearance/Palpation/Auscultation: supple  Mental Status   Orientation: oriented to person, oriented to place, oriented to problem, and oriented to time   Mood/Affect: appropriate mood and appropriate affect   Memory/Other: recent memory intact, remote memory intact, fund of knowledge intact, attention span normal, and concentration normal  Language   Language: (normal) language, no dysarthria, (normal) articulation, and no dysphasia/aphasia  Cranial Nerves   CN II Right: visual fields appear intact   CN II Left: visual fields appear intact   CN III, IV, VI: EOM no nystagmus, normal pursuit, and extraocular muscle strength normal   CN III: pupil normal size, pupil reactive to light and dark, pupil accomodates, and no ptosis   CN IV: normal   CN VI: normal   CN V Right: normal sensation and muscles of mastication intact   CN V Left: normal sensation and muscles of mastication intact   CN VII Right: normal facial expression   CN VII Left: normal facial expression   CN VIII Right: hearing in tact to normal conversation   CN VIII Left: hearing in tact to normal conversation   CN IX,X: normal palatal movement   CN XI Right: normal sternocleidomastoid and normal trapezius   CN XI Left: normal sternocleidomastoid and normal trapezius   CN XII: no tremors of the tongue, no fasciculation of the tongue, tongue protrudes midline, normal power to left, and normal power to right  Gait and Stance   Gait/Posture: station normal, ambulates independently, gait normal, and Romberg's test normal  Motor/Coordination Exam   General: no bradykinesia, no tremors, no chorea, no athetosis, no myoclonus, and no dyskinesia   Right Upper Extremity: normal motor strength, normal bulk, and normal tone   Left Upper Extremity: normal motor strength, normal bulk, and normal tone   Right Lower Extremity: normal motor strength, normal bulk, and normal tone   Left Lower Extremity: normal motor strength, normal bulk, and normal tone   Coordination: no drift, normal finger-to-nose, and rapid alternating movements normal  Reflexes   Reflexes Right: DTRS are normal throughout   Reflexes Left: DTRS are normal throughout  Sensory   Sensation: normal light touch, normal pinprick, normal temperature, normal vibration, normal position, normal DSS, and no neglect  Spine   Cervical Spine: no tenderness, no dystonia , and full ROM   Thoracic Spine: no spasms, no bony abnormalities, normal curvature, no tenderness, and full ROM   Low Back: full ROM, no pain, no spasms, and no bony abnormalities  Lungs   Auscultation: normal breath sounds  Skin   Inspection: no jaundice, no lesions, no rashes, and no cyanosis      /72 (Site: Left Upper Arm, Position: Sitting, Cuff Size: Medium Adult)   Pulse 82   Ht 5' 11\" (1.803 m)   Wt 190 lb 6.4 oz (86.4 kg)   SpO2 97%   BMI 26.56 kg/m²     Assessment and Plan     Diagnosis Orders   1. Migraine without aura, not intractable, without status migrainosus  topiramate (TOPAMAX) 25 MG tablet    topiramate (TOPAMAX) 50 MG tablet    rizatriptan (MAXALT) 10 MG tablet      2.  Seizures ([address] topiramate (TOPAMAX) 25 MG tablet    topiramate (TOPAMAX) 50 MG tablet          1) seizure disorder in the setting of a right parietal lobe subarachnoid cyst:  -- Generally well controlled maintained on Keppra 500 mg twice daily. His last seizure was around October-November 2022. -- He does miss a couple doses per week of his Keppra. Encourage medication compliance to reduce the possibility of a breakthrough seizure. 2) frequent episodic migraines occurring 3-4 times weekly. -- We will add Topamax to be titrated up to 50 mg twice daily for migraine prophylaxis. This will also help to some degree with seizure coverage as this also has antiepileptic properties. -- We will provide Maxalt 10 mg to be taken at the onset of migraine for migraine abortive therapy. He had not had a good response to Imitrex in the past.  If Maxalt is ineffective would next try Nurtec ODT and/or Ubrelvy. Medications prescribed for the patient were discussed in detail. This included a discussion of the potential risks vs the potential benefits of the medications. The patient was given time to ask questions and these were answered to the best of my ability. The patient appeared to understand the information provided. We discussed the importance of keeping a detailed headache diary. We discussed trying to identify headache triggers. We discussed the importance of staying well hydrated, eating three regular meals each day, getting plenty of hours of fitful sleep, and reducing stress to help prevent headaches. Return in about 3 months (around 6/8/2023) for Follow-up PA/NP.     Debra Quan DO

## 2023-03-29 NOTE — H&P
Hospital Medicine History & Physical      Name:  Selena Guzman /Age/Sex: 1985  (30 y.o. male)   MRN & CSN:  [id number] & [id number] Admission Date/Time: 2020  3:12 AM   Location:  [facility] PCP: NUBIA Cadena CNP       Date of Admission: 2020    Chief Complaint: No chief complaint on file. History of Present Illness: The patient is a 30 y.o. male with a history of seizures presents for evaluation of seizure like activity. Patient has a long history of seizures. He has been on multiple antiepileptics in the past but was unable to f/u with his neurologist due to financial reasons and has been off antiepileptics recently. Patient admits to marijuana use and take norco at home for migraines. Patient has had increased stress at work which is typically a trigger for his seizures. He states that he typically has absence seizures once a week and had increased absence seizures this week. He was at work and had a grand mal seizure, EMS gave 10 mg versed IM and took him to the ER. He was discharged after negative workup. Patient had another episode of seizure like activity and came back. Per ER note, patient was able to talk during this episode and was having spontaneous movement. Per ER, seizure lasted 30 seconds or so in the ER. He was admitted for neurology evaluation and monitoring. Patient admits to marijuana use occasionally. Patient at this time has a headache and generalized weakness. He typically has a headache after his seizures. Patient's past medical, social, and family history have been reviewed. Patient assessment and plan discussed and reviewed with admitting physician:   Dr. Bauman     Ten point ROS: reviewed negative, unless as noted in above HPI.     Past Medical History:        Diagnosis Date    Arachnoid cyst     Arthritis     Chiari malformation type I ([address]     Depression     IBS (irritable bowel syndrome)     Migraines     Seizures ([address] Past Surgical History:        Procedure Laterality Date    APPENDECTOMY      NASAL SEPTUM SURGERY         Medications Prior to Admission:    Prior to Admission medications    Medication Sig Start Date End Date Taking? Authorizing Provider   PARoxetine (PAXIL) 30 MG tablet Take 30 mg by mouth daily 4/16/20 4/16/21  Historical Provider, MD   levETIRAcetam (KEPPRA) 500 MG tablet Take 1 tablet by mouth 2 times daily 6/15/20   Sunshine Manzopasture,    oxyCODONE-acetaminophen (PERCOCET) 5-325 MG per tablet Take 1 tablet by mouth every 4 hours as needed for Pain. Historical Provider, MD   dicyclomine (BENTYL) 20 MG tablet Take 20 mg by mouth    Historical Provider, MD       Allergies:    Ambien [zolpidem tartrate]; Wellbutrin [bupropion]; and Codeine    Social History:    TOBACCO:   reports that he has been smoking cigarettes. He has a 5.00 pack-year smoking history. He has never used smokeless tobacco.  ETOH:   reports current alcohol use. Family History:    No family history on file. Vitals:   Vitals:    06/16/20 0314   BP: 99/65   Pulse: (!) 48   Resp: 16   Temp: 97.5 °F (36.4 °C)   TempSrc: Oral   SpO2: 95%       Physical Exam  GEN -Awake. NAD. Appears given age. EYES -PERRLA. No scleral erythema, discharge, or conjunctivitis. HENT -MM are moist. Oral pharynx without exudates, no evidence of thrush. NECK -Supple, no apparent thyromegaly or masses. RESP -CTA, no wheezes, rales or rhonchi. Symmetric chest movement while on room air. C/V -S1/S2 auscultated. RRR without appreciable M/R/G. No JVD or carotid bruits. Peripheral pulses equal bilaterally and palpable. Cap refill <3 sec. No peripheral edema. GI -Abdomen is soft non distended and without significant tenderness to palpation. + BS. No masses or guarding.  -No CVA/ flank tenderness. Ambrosio catheter is not present. LYMPH-No palpable cervical lymphadenopathy and no hepatosplenomegaly. No petechiae or ecchymoses.   MS -No gross joint convexity near the vertex which could represent a benign arachnoid cyst.  No definite underlying cerebral edema. Crowding of the cerebellar tonsils at the foramen magnum is similar to the prior study. ORBITS: The visualized portion of the orbits demonstrate no acute abnormality. SINUSES: The visualized paranasal sinuses and mastoid air cells demonstrate no acute abnormality. SOFT TISSUES/SKULL:  No acute abnormality of the visualized skull or soft tissues. No acute intracranial abnormality. No significant interval change compared to CT head done March 13, 2019. Labs:  Reviewed  Lab Results:  CBC   Recent Labs     06/15/20  2355   WBC 10.4   HGB 14.7   HCT 45.0         RENAL  Recent Labs     06/15/20  2355      K 3.8      CO2 31   BUN 13   CREATININE 0.7*     LFT'S  Recent Labs     06/15/20  2355   AST 20   ALT 22   BILITOT 0.4   ALKPHOS 73     COAG  No results for input(s): INR in the last 72 hours. CARDIAC ENZYMES  No results for input(s): CKTOTAL, CKMB, CKMBINDEX, TROPONINI in the last 72 hours. U/A:    Lab Results   Component Value Date    COLORU LT YELLOW 04/24/2017    WBCUA 1 04/24/2017    RBCUA 1 04/24/2017    MUCUS RARE 04/24/2017    BACTERIA RARE 04/24/2017    CLARITYU CLEAR 04/24/2017    SPECGRAV 1.017 04/24/2017    LEUKOCYTESUR NEGATIVE 04/24/2017    BLOODU NEGATIVE 04/24/2017     ABG  No results found for: HUC8TKP, BEART, E3BFOLDZ, PHART, THGBART, ZEB5WEM, PO2ART, HKS8PJT      Medical Decision Making:  Seizure  - History of seizures. Given versed 10 mg IM per EMS. Discharge from EMS and returned with seizure like activity again. Given 1,000 mg keppra IV in ER. CT head negative.    -NPO until swallow evaluation  -Neuro consult  -Neuro check every 4 hours  -No focal neurological deficit on examination  -Keppra   -Seizure precautions  -Ativan PRN    UDS positive for marijuana and benzos  History of chiari malformation and arachnoid cysts  IBS  Depression      DVT Prophylaxis: n/a

## 2023-05-02 ENCOUNTER — OFFICE VISIT (OUTPATIENT)
Dept: INTERNAL MEDICINE CLINIC | Age: 38
End: 2023-05-02
Payer: COMMERCIAL

## 2023-05-02 VITALS
OXYGEN SATURATION: 97 % | SYSTOLIC BLOOD PRESSURE: 118 MMHG | BODY MASS INDEX: 27.14 KG/M2 | DIASTOLIC BLOOD PRESSURE: 68 MMHG | WEIGHT: 194.6 LBS | RESPIRATION RATE: 16 BRPM | HEART RATE: 58 BPM | TEMPERATURE: 97.5 F

## 2023-05-02 DIAGNOSIS — R10.9 ABDOMINAL DISCOMFORT: ICD-10-CM

## 2023-05-02 DIAGNOSIS — G43.809 OTHER MIGRAINE WITHOUT STATUS MIGRAINOSUS, NOT INTRACTABLE: ICD-10-CM

## 2023-05-02 DIAGNOSIS — G40.909 RECURRENT SEIZURES (HCC): ICD-10-CM

## 2023-05-02 DIAGNOSIS — K58.0 IRRITABLE BOWEL SYNDROME WITH DIARRHEA: ICD-10-CM

## 2023-05-02 DIAGNOSIS — F33.42 RECURRENT MAJOR DEPRESSIVE DISORDER, IN FULL REMISSION (HCC): Primary | ICD-10-CM

## 2023-05-02 DIAGNOSIS — R00.1 BRADYCARDIA: ICD-10-CM

## 2023-05-02 DIAGNOSIS — G93.0 ARACHNOID CYST: ICD-10-CM

## 2023-05-02 DIAGNOSIS — E78.5 DYSLIPIDEMIA: ICD-10-CM

## 2023-05-02 PROBLEM — G43.909 MIGRAINE HEADACHE: Status: ACTIVE | Noted: 2023-05-02

## 2023-05-02 PROCEDURE — 99214 OFFICE O/P EST MOD 30 MIN: CPT | Performed by: INTERNAL MEDICINE

## 2023-05-02 RX ORDER — DICYCLOMINE HCL 20 MG
TABLET ORAL
Qty: 270 TABLET | Refills: 1 | OUTPATIENT
Start: 2023-05-02

## 2023-05-02 RX ORDER — ATORVASTATIN CALCIUM 40 MG/1
40 TABLET, FILM COATED ORAL NIGHTLY
Qty: 30 TABLET | Refills: 5 | Status: SHIPPED | OUTPATIENT
Start: 2023-05-02

## 2023-05-02 SDOH — ECONOMIC STABILITY: FOOD INSECURITY: WITHIN THE PAST 12 MONTHS, YOU WORRIED THAT YOUR FOOD WOULD RUN OUT BEFORE YOU GOT MONEY TO BUY MORE.: NEVER TRUE

## 2023-05-02 SDOH — ECONOMIC STABILITY: TRANSPORTATION INSECURITY
IN THE PAST 12 MONTHS, HAS LACK OF TRANSPORTATION KEPT YOU FROM MEETINGS, WORK, OR FROM GETTING THINGS NEEDED FOR DAILY LIVING?: NO

## 2023-05-02 SDOH — ECONOMIC STABILITY: FOOD INSECURITY: WITHIN THE PAST 12 MONTHS, THE FOOD YOU BOUGHT JUST DIDN'T LAST AND YOU DIDN'T HAVE MONEY TO GET MORE.: NEVER TRUE

## 2023-05-02 SDOH — ECONOMIC STABILITY: INCOME INSECURITY: HOW HARD IS IT FOR YOU TO PAY FOR THE VERY BASICS LIKE FOOD, HOUSING, MEDICAL CARE, AND HEATING?: NOT HARD AT ALL

## 2023-05-02 SDOH — ECONOMIC STABILITY: HOUSING INSECURITY
IN THE LAST 12 MONTHS, WAS THERE A TIME WHEN YOU DID NOT HAVE A STEADY PLACE TO SLEEP OR SLEPT IN A SHELTER (INCLUDING NOW)?: NO

## 2023-05-02 ASSESSMENT — ENCOUNTER SYMPTOMS
GASTROINTESTINAL NEGATIVE: 1
ALLERGIC/IMMUNOLOGIC NEGATIVE: 1
WHEEZING: 0
RESPIRATORY NEGATIVE: 1
SHORTNESS OF BREATH: 0
EYES NEGATIVE: 1
COUGH: 0

## 2023-05-02 ASSESSMENT — PATIENT HEALTH QUESTIONNAIRE - PHQ9
SUM OF ALL RESPONSES TO PHQ QUESTIONS 1-9: 0
SUM OF ALL RESPONSES TO PHQ9 QUESTIONS 1 & 2: 0
SUM OF ALL RESPONSES TO PHQ QUESTIONS 1-9: 0
SUM OF ALL RESPONSES TO PHQ QUESTIONS 1-9: 0
2. FEELING DOWN, DEPRESSED OR HOPELESS: 0
SUM OF ALL RESPONSES TO PHQ QUESTIONS 1-9: 0
1. LITTLE INTEREST OR PLEASURE IN DOING THINGS: 0

## 2023-05-02 NOTE — PROGRESS NOTES
without status migrainosus, not intractable  Overview:  Patient has seen Dr. Jacquelyn Bautista. Started on Topamax and Maxalt. Return to office in 3 months. Orders Placed This Encounter   Procedures    Lipid, Fasting    Comprehensive Metabolic Panel         Mediations reviewed with the patient. Continue current medications. Appropriate prescriptions are addressed. After visit summeryprovided. Follow up as directed sooner if needed. Questions answered and patient verbalizes understanding. Call for any problems, questions, or concerns. Allergies   Allergen Reactions    Ambien [Zolpidem Tartrate] Other (See Comments)     hallucinations    Wellbutrin [Bupropion] Hives    Codeine Nausea And Vomiting     Current Outpatient Medications   Medication Sig Dispense Refill    topiramate (TOPAMAX) 50 MG tablet Take 1 tablet by mouth 2 times daily NOTE TO PHARMACY: DO NOT FILL UNTIL 25 MG RX IS COMPLETE 60 tablet 5    rizatriptan (MAXALT) 10 MG tablet Take 1 tablet by mouth once as needed for Migraine Take 1 tab @ onset of migraine. May repeat in 2 hrs if needed. Not to exceed 2 tabs per day 9 tablet 2    desvenlafaxine succinate (PRISTIQ) 50 MG TB24 extended release tablet Take 1 tablet by mouth daily 90 tablet 1    levETIRAcetam (KEPPRA) 500 MG tablet Take 1 tablet by mouth 2 times daily 180 tablet 1    aspirin 81 MG chewable tablet Take 1 tablet by mouth daily 30 tablet 5    atorvastatin (LIPITOR) 40 MG tablet Take 1 tablet by mouth nightly 30 tablet 5    dicyclomine (BENTYL) 20 MG tablet Take 1 tablet by mouth 3 times daily as needed (abdominal pain) 90 tablet 3     No current facility-administered medications for this visit.      Past Medical History:   Diagnosis Date    Arachnoid cyst     Arthritis     Bradycardia     Chiari malformation type I ([facility] [address]     Depression     History of cardiac monitoring     IBS (irritable bowel syndrome)     Migraines     Seizures ([facility] [address]      Past Surgical History:   Procedure Laterality Date

## 2023-06-01 ENCOUNTER — OFFICE VISIT (OUTPATIENT)
Dept: NEUROLOGY | Age: 38
End: 2023-06-01
Payer: COMMERCIAL

## 2023-06-01 VITALS
DIASTOLIC BLOOD PRESSURE: 74 MMHG | HEIGHT: 71 IN | BODY MASS INDEX: 27.3 KG/M2 | WEIGHT: 195 LBS | SYSTOLIC BLOOD PRESSURE: 120 MMHG | OXYGEN SATURATION: 97 % | HEART RATE: 83 BPM

## 2023-06-01 DIAGNOSIS — G43.009 MIGRAINE WITHOUT AURA, NOT INTRACTABLE, WITHOUT STATUS MIGRAINOSUS: Primary | ICD-10-CM

## 2023-06-01 DIAGNOSIS — R56.9 SEIZURES (HCC): ICD-10-CM

## 2023-06-01 PROCEDURE — 99214 OFFICE O/P EST MOD 30 MIN: CPT | Performed by: NURSE PRACTITIONER

## 2023-06-01 RX ORDER — TOPIRAMATE 100 MG/1
100 TABLET, FILM COATED ORAL 2 TIMES DAILY
Qty: 60 TABLET | Refills: 3 | Status: SHIPPED | OUTPATIENT
Start: 2023-06-01 | End: 2023-06-01 | Stop reason: ALTCHOICE

## 2023-06-01 RX ORDER — UBROGEPANT 100 MG/1
TABLET ORAL
Qty: 2 TABLET | Refills: 0 | COMMUNITY
Start: 2023-06-01

## 2023-06-01 RX ORDER — ATOGEPANT 60 MG/1
60 TABLET ORAL DAILY
Qty: 30 TABLET | Refills: 5 | Status: SHIPPED | OUTPATIENT
Start: 2023-06-01

## 2023-06-01 RX ORDER — RIMEGEPANT SULFATE 75 MG/75MG
TABLET, ORALLY DISINTEGRATING ORAL
Qty: 2 TABLET | Refills: 0 | COMMUNITY
Start: 2023-06-01

## 2023-06-01 RX ORDER — TOPIRAMATE 50 MG/1
50 TABLET, FILM COATED ORAL 2 TIMES DAILY
Qty: 60 TABLET | Refills: 5 | Status: SHIPPED | OUTPATIENT
Start: 2023-06-01

## 2023-07-17 DIAGNOSIS — R10.9 ABDOMINAL DISCOMFORT: ICD-10-CM

## 2023-07-18 RX ORDER — DICYCLOMINE HCL 20 MG
TABLET ORAL
Qty: 270 TABLET | Refills: 1 | Status: SHIPPED | OUTPATIENT
Start: 2023-07-18

## 2023-08-02 ENCOUNTER — OFFICE VISIT (OUTPATIENT)
Dept: INTERNAL MEDICINE CLINIC | Age: 38
End: 2023-08-02
Payer: COMMERCIAL

## 2023-08-02 VITALS
BODY MASS INDEX: 26.97 KG/M2 | WEIGHT: 193.4 LBS | DIASTOLIC BLOOD PRESSURE: 72 MMHG | SYSTOLIC BLOOD PRESSURE: 118 MMHG | RESPIRATION RATE: 16 BRPM | OXYGEN SATURATION: 96 % | TEMPERATURE: 97.7 F | HEART RATE: 64 BPM

## 2023-08-02 DIAGNOSIS — F33.42 RECURRENT MAJOR DEPRESSIVE DISORDER, IN FULL REMISSION (HCC): ICD-10-CM

## 2023-08-02 DIAGNOSIS — G40.909 RECURRENT SEIZURES (HCC): ICD-10-CM

## 2023-08-02 DIAGNOSIS — E78.5 DYSLIPIDEMIA: Primary | ICD-10-CM

## 2023-08-02 DIAGNOSIS — K58.0 IRRITABLE BOWEL SYNDROME WITH DIARRHEA: ICD-10-CM

## 2023-08-02 DIAGNOSIS — G43.809 OTHER MIGRAINE WITHOUT STATUS MIGRAINOSUS, NOT INTRACTABLE: ICD-10-CM

## 2023-08-02 DIAGNOSIS — R00.1 BRADYCARDIA: ICD-10-CM

## 2023-08-02 PROCEDURE — 99213 OFFICE O/P EST LOW 20 MIN: CPT | Performed by: INTERNAL MEDICINE

## 2023-08-02 NOTE — PROGRESS NOTES
Date/Time    WBC 9.3 12/15/2022 10:25 AM    HGB 14.6 12/15/2022 10:25 AM    HCT 42.1 12/15/2022 10:25 AM     12/15/2022 10:25 AM     Lipids:   Lab Results   Component Value Date    HDL 58 12/16/2022    LDLCALC 62 12/16/2022     Renal:   Lab Results   Component Value Date/Time    BUN 12 12/15/2022 10:25 AM    CREATININE 0.6 12/15/2022 10:25 AM     12/15/2022 10:25 AM    K 3.9 12/15/2022 10:25 AM    ALT 13 12/15/2022 10:25 AM    AST 17 12/15/2022 10:25 AM    GLUCOSE 118 12/15/2022 10:25 AM    GLUF 66 10/14/2015 11:49 PM     PT/INR:   Lab Results   Component Value Date/Time    INR 0.95 08/11/2014 06:35 AM     A1C: No results found for: Murphy Edwards MD, 8/2/2023 , 9:55 AM

## 2024-04-10 DIAGNOSIS — R10.9 ABDOMINAL DISCOMFORT: ICD-10-CM

## 2024-04-10 RX ORDER — DICYCLOMINE HCL 20 MG
TABLET ORAL
Qty: 90 TABLET | Refills: 0 | Status: SHIPPED | OUTPATIENT
Start: 2024-04-10

## 2024-04-24 DIAGNOSIS — R10.9 ABDOMINAL DISCOMFORT: ICD-10-CM

## 2024-04-24 RX ORDER — DICYCLOMINE HCL 20 MG
TABLET ORAL
Qty: 90 TABLET | Refills: 0 | OUTPATIENT
Start: 2024-04-24

## 2024-04-24 NOTE — TELEPHONE ENCOUNTER
Tried to reach patient, no answer and no voicemail , needs to make follow up appt. Will send a letter for patient to call office for an appt soon.

## 2024-05-15 ENCOUNTER — OFFICE VISIT (OUTPATIENT)
Age: 39
End: 2024-05-15
Payer: COMMERCIAL

## 2024-05-15 VITALS
WEIGHT: 203.8 LBS | OXYGEN SATURATION: 98 % | BODY MASS INDEX: 28.53 KG/M2 | HEIGHT: 71 IN | DIASTOLIC BLOOD PRESSURE: 68 MMHG | HEART RATE: 57 BPM | SYSTOLIC BLOOD PRESSURE: 100 MMHG | RESPIRATION RATE: 18 BRPM

## 2024-05-15 DIAGNOSIS — G40.909 RECURRENT SEIZURES (HCC): ICD-10-CM

## 2024-05-15 DIAGNOSIS — G43.009 MIGRAINE WITHOUT AURA, NOT INTRACTABLE, WITHOUT STATUS MIGRAINOSUS: ICD-10-CM

## 2024-05-15 DIAGNOSIS — F33.42 RECURRENT MAJOR DEPRESSIVE DISORDER, IN FULL REMISSION (HCC): ICD-10-CM

## 2024-05-15 DIAGNOSIS — R00.1 BRADYCARDIA: ICD-10-CM

## 2024-05-15 DIAGNOSIS — G93.0 ARACHNOID CYST: ICD-10-CM

## 2024-05-15 DIAGNOSIS — G43.809 OTHER MIGRAINE WITHOUT STATUS MIGRAINOSUS, NOT INTRACTABLE: ICD-10-CM

## 2024-05-15 DIAGNOSIS — E78.5 DYSLIPIDEMIA: Primary | ICD-10-CM

## 2024-05-15 DIAGNOSIS — K58.0 IRRITABLE BOWEL SYNDROME WITH DIARRHEA: ICD-10-CM

## 2024-05-15 PROCEDURE — 99214 OFFICE O/P EST MOD 30 MIN: CPT | Performed by: INTERNAL MEDICINE

## 2024-05-15 RX ORDER — TRIAMCINOLONE ACETONIDE 1 MG/G
OINTMENT TOPICAL 2 TIMES DAILY
Qty: 45 G | Refills: 0 | Status: SHIPPED | OUTPATIENT
Start: 2024-05-15 | End: 2024-05-22

## 2024-05-15 RX ORDER — ATORVASTATIN CALCIUM 40 MG/1
40 TABLET, FILM COATED ORAL NIGHTLY
Qty: 30 TABLET | Refills: 5 | Status: SHIPPED | OUTPATIENT
Start: 2024-05-15

## 2024-05-15 RX ORDER — LEVETIRACETAM 500 MG/1
500 TABLET ORAL 2 TIMES DAILY
Qty: 180 TABLET | Refills: 1 | Status: SHIPPED | OUTPATIENT
Start: 2024-05-15

## 2024-05-15 RX ORDER — UBROGEPANT 100 MG/1
TABLET ORAL
Qty: 2 TABLET | Refills: 0 | Status: CANCELLED | OUTPATIENT
Start: 2024-05-15

## 2024-05-15 RX ORDER — DICYCLOMINE HCL 20 MG
TABLET ORAL
Qty: 90 TABLET | Refills: 3 | Status: SHIPPED | OUTPATIENT
Start: 2024-05-15

## 2024-05-15 SDOH — ECONOMIC STABILITY: FOOD INSECURITY: WITHIN THE PAST 12 MONTHS, THE FOOD YOU BOUGHT JUST DIDN'T LAST AND YOU DIDN'T HAVE MONEY TO GET MORE.: NEVER TRUE

## 2024-05-15 SDOH — ECONOMIC STABILITY: FOOD INSECURITY: WITHIN THE PAST 12 MONTHS, YOU WORRIED THAT YOUR FOOD WOULD RUN OUT BEFORE YOU GOT MONEY TO BUY MORE.: NEVER TRUE

## 2024-05-15 SDOH — ECONOMIC STABILITY: INCOME INSECURITY: HOW HARD IS IT FOR YOU TO PAY FOR THE VERY BASICS LIKE FOOD, HOUSING, MEDICAL CARE, AND HEATING?: NOT VERY HARD

## 2024-05-15 ASSESSMENT — PATIENT HEALTH QUESTIONNAIRE - PHQ9
4. FEELING TIRED OR HAVING LITTLE ENERGY: NEARLY EVERY DAY
SUM OF ALL RESPONSES TO PHQ QUESTIONS 1-9: 4
9. THOUGHTS THAT YOU WOULD BE BETTER OFF DEAD, OR OF HURTING YOURSELF: NOT AT ALL
8. MOVING OR SPEAKING SO SLOWLY THAT OTHER PEOPLE COULD HAVE NOTICED. OR THE OPPOSITE, BEING SO FIGETY OR RESTLESS THAT YOU HAVE BEEN MOVING AROUND A LOT MORE THAN USUAL: NOT AT ALL
1. LITTLE INTEREST OR PLEASURE IN DOING THINGS: NOT AT ALL
SUM OF ALL RESPONSES TO PHQ9 QUESTIONS 1 & 2: 0
7. TROUBLE CONCENTRATING ON THINGS, SUCH AS READING THE NEWSPAPER OR WATCHING TELEVISION: SEVERAL DAYS
10. IF YOU CHECKED OFF ANY PROBLEMS, HOW DIFFICULT HAVE THESE PROBLEMS MADE IT FOR YOU TO DO YOUR WORK, TAKE CARE OF THINGS AT HOME, OR GET ALONG WITH OTHER PEOPLE: SOMEWHAT DIFFICULT
SUM OF ALL RESPONSES TO PHQ QUESTIONS 1-9: 4
3. TROUBLE FALLING OR STAYING ASLEEP: NOT AT ALL
SUM OF ALL RESPONSES TO PHQ QUESTIONS 1-9: 4
5. POOR APPETITE OR OVEREATING: NOT AT ALL
6. FEELING BAD ABOUT YOURSELF - OR THAT YOU ARE A FAILURE OR HAVE LET YOURSELF OR YOUR FAMILY DOWN: NOT AT ALL
2. FEELING DOWN, DEPRESSED OR HOPELESS: NOT AT ALL
SUM OF ALL RESPONSES TO PHQ QUESTIONS 1-9: 4

## 2024-05-15 ASSESSMENT — ENCOUNTER SYMPTOMS
GASTROINTESTINAL NEGATIVE: 1
EYES NEGATIVE: 1
ALLERGIC/IMMUNOLOGIC NEGATIVE: 1

## 2024-05-15 NOTE — PROGRESS NOTES
Patient having a itchy area on his abdominal area.  He has used various creams and lotions without relief.

## 2024-05-15 NOTE — PROGRESS NOTES
José Patel Jr.  Patient's  is 1985  Seen in office on 5/15/2024      SUBJECTIVE:  José mg 38 y.o.year old male presents today   Chief Complaint   Patient presents with    Other       Patient is here for follow-up of hyperlipidemia, irritable bowel syndrome, migraine headaches and seizures.  He is complaining of Pruritus in lower abdomen below the umbilicus for one month. No rash.  Did not change the soap or detergent.    Patient has history of migraine headaches and was referred to neurologist but he has not seen the neurologist for few months.  They were trying different medications on him.  He said he has some samples to abort the headaches.  He will call for the refills there.    History of seizures and is on Keppra for that.  He does not have any follow-up appointment with neurologist and is requesting a refill.  No seizures . On medication     History of irritable bowel syndrome and takes Bentyl as needed 1-3 a day and that is helping him.    Patient denies any more depression and is not on any medication for that.    Taking medications regularly. No side effects noted.    Review of Systems   Constitutional: Negative.    HENT: Negative.     Eyes: Negative.    Respiratory: Negative.     Cardiovascular: Negative.    Gastrointestinal: Negative.    Endocrine: Negative.    Genitourinary: Negative.    Musculoskeletal: Negative.    Skin: Negative.    Allergic/Immunologic: Negative.    Neurological: Negative.    Hematological: Negative.    Psychiatric/Behavioral: Negative.         OBJECTIVE: /68 (Site: Left Upper Arm, Position: Sitting, Cuff Size: Large Adult)   Pulse 57   Resp 18   Ht 1.803 m (5' 11\")   Wt 92.4 kg (203 lb 12.8 oz)   SpO2 98%   BMI 28.42 kg/m²     Wt Readings from Last 3 Encounters:   05/15/24 92.4 kg (203 lb 12.8 oz)   23 87.7 kg (193 lb 6.4 oz)   23 88.5 kg (195 lb)      GENERAL: - Alert, oriented, pleasant, in no apparent distress.    HEENT: - Conjunctiva

## 2024-10-06 DIAGNOSIS — R10.9 UNSPECIFIED ABDOMINAL PAIN: ICD-10-CM

## 2024-10-07 RX ORDER — DICYCLOMINE HCL 20 MG
TABLET ORAL
Qty: 90 TABLET | Refills: 0 | Status: SHIPPED | OUTPATIENT
Start: 2024-10-07

## 2024-10-07 NOTE — TELEPHONE ENCOUNTER
Refill request, tried to reach patient to schedule follow up appt. No answer no voicemail  available.

## 2024-10-30 ENCOUNTER — TELEPHONE (OUTPATIENT)
Age: 39
End: 2024-10-30

## 2024-10-30 DIAGNOSIS — R10.9 ABDOMINAL CRAMPS: Primary | ICD-10-CM

## 2024-10-30 DIAGNOSIS — R10.9 UNSPECIFIED ABDOMINAL PAIN: ICD-10-CM

## 2024-10-30 RX ORDER — DICYCLOMINE HCL 20 MG
20 TABLET ORAL EVERY 8 HOURS PRN
Qty: 60 TABLET | Refills: 0 | Status: SHIPPED | OUTPATIENT
Start: 2024-10-30

## 2024-10-30 NOTE — TELEPHONE ENCOUNTER
Attempted to contact patient and schedule a follow up appointment.  No voicemail available to leave a message.

## 2025-04-11 ENCOUNTER — OFFICE VISIT (OUTPATIENT)
Age: 40
End: 2025-04-11
Payer: COMMERCIAL

## 2025-04-11 VITALS
DIASTOLIC BLOOD PRESSURE: 80 MMHG | HEART RATE: 56 BPM | TEMPERATURE: 97.9 F | RESPIRATION RATE: 16 BRPM | BODY MASS INDEX: 27.53 KG/M2 | SYSTOLIC BLOOD PRESSURE: 128 MMHG | OXYGEN SATURATION: 99 % | WEIGHT: 197.4 LBS

## 2025-04-11 DIAGNOSIS — R10.9 ABDOMINAL CRAMPS: ICD-10-CM

## 2025-04-11 DIAGNOSIS — F33.42 RECURRENT MAJOR DEPRESSIVE DISORDER, IN FULL REMISSION: Primary | ICD-10-CM

## 2025-04-11 DIAGNOSIS — E78.5 DYSLIPIDEMIA: ICD-10-CM

## 2025-04-11 DIAGNOSIS — R45.86 MOOD SWINGS: ICD-10-CM

## 2025-04-11 DIAGNOSIS — G43.001 MIGRAINE WITHOUT AURA AND WITH STATUS MIGRAINOSUS, NOT INTRACTABLE: ICD-10-CM

## 2025-04-11 DIAGNOSIS — K58.0 IRRITABLE BOWEL SYNDROME WITH DIARRHEA: ICD-10-CM

## 2025-04-11 DIAGNOSIS — G40.909 RECURRENT SEIZURES (HCC): ICD-10-CM

## 2025-04-11 PROCEDURE — 99214 OFFICE O/P EST MOD 30 MIN: CPT | Performed by: INTERNAL MEDICINE

## 2025-04-11 RX ORDER — ATORVASTATIN CALCIUM 40 MG/1
40 TABLET, FILM COATED ORAL NIGHTLY
Qty: 30 TABLET | Refills: 5 | Status: SHIPPED | OUTPATIENT
Start: 2025-04-11

## 2025-04-11 RX ORDER — DICYCLOMINE HCL 20 MG
20 TABLET ORAL EVERY 8 HOURS PRN
Qty: 90 TABLET | Refills: 3 | Status: SHIPPED | OUTPATIENT
Start: 2025-04-11

## 2025-04-11 RX ORDER — LEVETIRACETAM 500 MG/1
500 TABLET ORAL 2 TIMES DAILY
Qty: 180 TABLET | Refills: 1 | Status: SHIPPED | OUTPATIENT
Start: 2025-04-11

## 2025-04-11 ASSESSMENT — PATIENT HEALTH QUESTIONNAIRE - PHQ9
1. LITTLE INTEREST OR PLEASURE IN DOING THINGS: SEVERAL DAYS
SUM OF ALL RESPONSES TO PHQ QUESTIONS 1-9: 1
2. FEELING DOWN, DEPRESSED OR HOPELESS: NOT AT ALL
SUM OF ALL RESPONSES TO PHQ QUESTIONS 1-9: 1

## 2025-04-11 NOTE — PROGRESS NOTES
118 12/15/2022 10:25 AM    GLUF 66 10/14/2015 11:49 PM     PT/INR:   Lab Results   Component Value Date/Time    INR 0.95 08/11/2014 06:35 AM     A1C: No results found for: \"LABA1C\"        Grace Schaffer MD, 4/11/2025 , 12:17 PM

## 2025-04-15 ENCOUNTER — LAB (OUTPATIENT)
Age: 40
End: 2025-04-15
Payer: COMMERCIAL

## 2025-04-15 DIAGNOSIS — E78.5 DYSLIPIDEMIA: ICD-10-CM

## 2025-04-15 LAB
ALBUMIN SERPL-MCNC: 4.4 G/DL (ref 3.4–5)
ALBUMIN/GLOB SERPL: 1.9 {RATIO} (ref 1.1–2.2)
ALP SERPL-CCNC: 82 U/L (ref 40–129)
ALT SERPL-CCNC: 21 U/L (ref 10–40)
ANION GAP SERPL CALCULATED.3IONS-SCNC: 10 MMOL/L (ref 3–16)
AST SERPL-CCNC: 17 U/L (ref 15–37)
BASOPHILS # BLD: 0.1 K/UL (ref 0–0.2)
BASOPHILS NFR BLD: 1 %
BILIRUB SERPL-MCNC: 0.4 MG/DL (ref 0–1)
BUN SERPL-MCNC: 13 MG/DL (ref 7–20)
CALCIUM SERPL-MCNC: 9.2 MG/DL (ref 8.3–10.6)
CHLORIDE SERPL-SCNC: 101 MMOL/L (ref 99–110)
CHOLEST SERPL-MCNC: 182 MG/DL (ref 0–199)
CO2 SERPL-SCNC: 26 MMOL/L (ref 21–32)
CREAT SERPL-MCNC: 0.8 MG/DL (ref 0.9–1.3)
DEPRECATED RDW RBC AUTO: 13.6 % (ref 12.4–15.4)
EOSINOPHIL # BLD: 0.2 K/UL (ref 0–0.6)
EOSINOPHIL NFR BLD: 3 %
GFR SERPLBLD CREATININE-BSD FMLA CKD-EPI: >90 ML/MIN/{1.73_M2}
GLUCOSE SERPL-MCNC: 102 MG/DL (ref 70–99)
HCT VFR BLD AUTO: 44.1 % (ref 40.5–52.5)
HDLC SERPL-MCNC: 47 MG/DL (ref 40–60)
HGB BLD-MCNC: 15 G/DL (ref 13.5–17.5)
LDL CHOLESTEROL: 102 MG/DL
LYMPHOCYTES # BLD: 2.5 K/UL (ref 1–5.1)
LYMPHOCYTES NFR BLD: 31 %
MCH RBC QN AUTO: 29.8 PG (ref 26–34)
MCHC RBC AUTO-ENTMCNC: 34 G/DL (ref 31–36)
MCV RBC AUTO: 87.8 FL (ref 80–100)
MONOCYTES # BLD: 1.1 K/UL (ref 0–1.3)
MONOCYTES NFR BLD: 13 %
NEUTROPHILS # BLD: 4.3 K/UL (ref 1.7–7.7)
NEUTROPHILS NFR BLD: 52 %
PLATELET # BLD AUTO: 240 K/UL (ref 135–450)
PLATELET BLD QL SMEAR: ADEQUATE
PMV BLD AUTO: 10.5 FL (ref 5–10.5)
POTASSIUM SERPL-SCNC: 4.1 MMOL/L (ref 3.5–5.1)
PROT SERPL-MCNC: 6.7 G/DL (ref 6.4–8.2)
RBC # BLD AUTO: 5.02 M/UL (ref 4.2–5.9)
SLIDE REVIEW: NORMAL
SODIUM SERPL-SCNC: 137 MMOL/L (ref 136–145)
TRIGL SERPL-MCNC: 166 MG/DL (ref 0–150)
VLDLC SERPL CALC-MCNC: 33 MG/DL
WBC # BLD AUTO: 8.2 K/UL (ref 4–11)

## 2025-04-15 PROCEDURE — 36415 COLL VENOUS BLD VENIPUNCTURE: CPT | Performed by: INTERNAL MEDICINE

## 2025-04-23 ENCOUNTER — RESULTS FOLLOW-UP (OUTPATIENT)
Age: 40
End: 2025-04-23

## 2025-07-14 ENCOUNTER — PATIENT MESSAGE (OUTPATIENT)
Age: 40
End: 2025-07-14

## 2025-07-14 ENCOUNTER — OFFICE VISIT (OUTPATIENT)
Age: 40
End: 2025-07-14
Payer: COMMERCIAL

## 2025-07-14 ENCOUNTER — TELEPHONE (OUTPATIENT)
Age: 40
End: 2025-07-14

## 2025-07-14 VITALS
BODY MASS INDEX: 28.42 KG/M2 | DIASTOLIC BLOOD PRESSURE: 66 MMHG | RESPIRATION RATE: 20 BRPM | HEIGHT: 71 IN | WEIGHT: 203 LBS | SYSTOLIC BLOOD PRESSURE: 98 MMHG | HEART RATE: 91 BPM | OXYGEN SATURATION: 97 %

## 2025-07-14 DIAGNOSIS — G40.909 RECURRENT SEIZURES (HCC): ICD-10-CM

## 2025-07-14 DIAGNOSIS — F33.42 RECURRENT MAJOR DEPRESSIVE DISORDER, IN FULL REMISSION: Primary | ICD-10-CM

## 2025-07-14 DIAGNOSIS — R21 RASH AND NONSPECIFIC SKIN ERUPTION: ICD-10-CM

## 2025-07-14 DIAGNOSIS — K58.0 IRRITABLE BOWEL SYNDROME WITH DIARRHEA: ICD-10-CM

## 2025-07-14 DIAGNOSIS — R73.9 HYPERGLYCEMIA: ICD-10-CM

## 2025-07-14 DIAGNOSIS — E78.5 DYSLIPIDEMIA: ICD-10-CM

## 2025-07-14 DIAGNOSIS — G93.0 ARACHNOID CYST: ICD-10-CM

## 2025-07-14 DIAGNOSIS — G43.001 MIGRAINE WITHOUT AURA AND WITH STATUS MIGRAINOSUS, NOT INTRACTABLE: ICD-10-CM

## 2025-07-14 LAB — HBA1C MFR BLD: 5.3 %

## 2025-07-14 PROCEDURE — 83036 HEMOGLOBIN GLYCOSYLATED A1C: CPT | Performed by: INTERNAL MEDICINE

## 2025-07-14 SDOH — ECONOMIC STABILITY: FOOD INSECURITY: WITHIN THE PAST 12 MONTHS, THE FOOD YOU BOUGHT JUST DIDN'T LAST AND YOU DIDN'T HAVE MONEY TO GET MORE.: NEVER TRUE

## 2025-07-14 SDOH — ECONOMIC STABILITY: FOOD INSECURITY: WITHIN THE PAST 12 MONTHS, YOU WORRIED THAT YOUR FOOD WOULD RUN OUT BEFORE YOU GOT MONEY TO BUY MORE.: NEVER TRUE

## 2025-07-14 NOTE — PROGRESS NOTES
José ABIGAIL Patel Jr.  Patient's  is 1985  Seen in office on 2025      SUBJECTIVE:  José mg 39 y.o.year old male presents today   Chief Complaint   Patient presents with    Depression     History of Present Illness  The patient is a 39-year-old male who presents for evaluation of depression, seizure disorder, migraine headaches, irritable bowel syndrome, dyslipidemia, and rash.    He continues to experience episodes of anger and frustration, even though he is not currently under the care of a therapist. He has been off Pristiq for some time now.    He has a history of seizures since the age of 6 but has not had any recent episodes. His last consultation with Dr. Camargo was positive, with no issues reported. He is still taking Keppra.    He also experiences migraines, for which he was prescribed Ubrelvy by Dr. Camargo. However, he has not had a follow-up visit with him recently. His migraines have been well-controlled, although he occasionally experiences sudden, sharp pain that persists for an extended period.    He has irritable bowel syndrome with diarrhea, for which he takes Bentyl. However, he feels that the medication's effectiveness has diminished recently. He has not undergone a colonoscopy and his last visit to a gastroenterologist was quite some time ago.    He has been dealing with athlete's foot for the past 6 months, which he has been treating with Tinactin cream and spray. Even though there is no visible flaking or scaling, he experiences significant itching from his feet to the top of his ankle. He also reports issues with infected hair follicles, which become yellow and pus-filled, requiring drainage. These areas then swell up significantly, resembling hives. This occurs all over his body, not just on his left arm. He also mentions an itchy spot on his back that swelled up after scratching, similar to hives.    He has high cholesterol and is currently taking atorvastatin 40 mg daily. He

## 2025-08-10 DIAGNOSIS — R10.9 ABDOMINAL CRAMPS: ICD-10-CM

## 2025-08-11 RX ORDER — DICYCLOMINE HCL 20 MG
20 TABLET ORAL EVERY 8 HOURS PRN
Qty: 100 TABLET | Refills: 1 | Status: SHIPPED | OUTPATIENT
Start: 2025-08-11

## 2025-08-12 ENCOUNTER — TELEPHONE (OUTPATIENT)
Age: 40
End: 2025-08-12